# Patient Record
Sex: FEMALE | Race: WHITE | NOT HISPANIC OR LATINO | Employment: OTHER | URBAN - METROPOLITAN AREA
[De-identification: names, ages, dates, MRNs, and addresses within clinical notes are randomized per-mention and may not be internally consistent; named-entity substitution may affect disease eponyms.]

---

## 2023-10-25 ENCOUNTER — LAB (OUTPATIENT)
Dept: LAB | Facility: LAB | Age: 78
End: 2023-10-25
Payer: MEDICARE

## 2023-10-25 DIAGNOSIS — E55.9 VITAMIN D DEFICIENCY, UNSPECIFIED: Primary | ICD-10-CM

## 2023-10-25 DIAGNOSIS — E05.90 THYROTOXICOSIS, UNSPECIFIED WITHOUT THYROTOXIC CRISIS OR STORM: ICD-10-CM

## 2023-10-25 LAB
25(OH)D3 SERPL-MCNC: 25 NG/ML (ref 30–100)
ALBUMIN SERPL BCP-MCNC: 4.5 G/DL (ref 3.4–5)
ALP SERPL-CCNC: 80 U/L (ref 33–136)
ALT SERPL W P-5'-P-CCNC: 27 U/L (ref 7–45)
ANION GAP SERPL CALC-SCNC: 14 MMOL/L (ref 10–20)
AST SERPL W P-5'-P-CCNC: 32 U/L (ref 9–39)
BILIRUB SERPL-MCNC: 0.5 MG/DL (ref 0–1.2)
BUN SERPL-MCNC: 21 MG/DL (ref 6–23)
CALCIUM SERPL-MCNC: 9.2 MG/DL (ref 8.6–10.3)
CHLORIDE SERPL-SCNC: 108 MMOL/L (ref 98–107)
CO2 SERPL-SCNC: 24 MMOL/L (ref 21–32)
CREAT SERPL-MCNC: 0.83 MG/DL (ref 0.5–1.05)
GFR SERPL CREATININE-BSD FRML MDRD: 72 ML/MIN/1.73M*2
GLUCOSE SERPL-MCNC: 111 MG/DL (ref 74–99)
POTASSIUM SERPL-SCNC: 4.1 MMOL/L (ref 3.5–5.3)
PROT SERPL-MCNC: 7.2 G/DL (ref 6.4–8.2)
SODIUM SERPL-SCNC: 142 MMOL/L (ref 136–145)
T3FREE SERPL-MCNC: 2.8 PG/ML (ref 2.3–4.2)
TSH SERPL-ACNC: 3.23 MIU/L (ref 0.44–3.98)

## 2023-10-25 PROCEDURE — 80053 COMPREHEN METABOLIC PANEL: CPT

## 2023-10-25 PROCEDURE — 84443 ASSAY THYROID STIM HORMONE: CPT

## 2023-10-25 PROCEDURE — 36415 COLL VENOUS BLD VENIPUNCTURE: CPT

## 2023-10-25 PROCEDURE — 84481 FREE ASSAY (FT-3): CPT

## 2023-10-25 PROCEDURE — 82306 VITAMIN D 25 HYDROXY: CPT

## 2023-10-25 PROCEDURE — 83519 RIA NONANTIBODY: CPT

## 2023-10-27 LAB — TSH RECEP AB SER-ACNC: 2.16 IU/L

## 2023-11-07 ENCOUNTER — TELEPHONE (OUTPATIENT)
Dept: CARDIOLOGY | Facility: CLINIC | Age: 78
End: 2023-11-07
Payer: MEDICARE

## 2023-11-07 NOTE — TELEPHONE ENCOUNTER
Pt called to inquire when to take Metoprolol Succinate in the morning or evening. Dr. Roman changed Metoprolol tartrate to Metoprolol Succinate.    Called pt and informed her to take Metoprolol Succinate in the morning. Pt verbalizes understanding.

## 2024-02-05 ENCOUNTER — TELEPHONE (OUTPATIENT)
Dept: CARDIOLOGY | Facility: CLINIC | Age: 79
End: 2024-02-05
Payer: MEDICARE

## 2024-02-05 NOTE — TELEPHONE ENCOUNTER
Pt called because she was concerned about her BP. Pt states she went to [facility] today due to a flare of diverticulosis and her BP was very elevated: 200/? - pt couldn't remember diastolic. Pt states she was given an extra dose of Metoprolol and told to call her doctor because her Metoprolol  should be adjusted. Pt states she was also informed her wrist cuff is probably inaccurate.    Pt uses a wrist cuff at home and her home BP's are pretty accurate on a regular basis. Pt went home from the Urgent Care and her BP was down to 120's/70's.    Informed pt her BP may have been elevated due to her flare of diverticulitis because she was most likely having discomfort, stress and anxiety were increased. Informed pt that when used properly, wrist cuffs are accurate. Reviewed method for taking home BP and also frequency - too often may cause BP to be higher. Pt verbalizes understanding of all instructions and information provided. All questions and concerns addressed at this time.

## 2024-08-19 PROBLEM — Z96.659 TOTAL KNEE REPLACEMENT STATUS: Status: ACTIVE | Noted: 2017-09-18

## 2024-08-19 PROBLEM — F41.9 ANXIETY: Status: ACTIVE | Noted: 2020-06-17

## 2024-08-19 PROBLEM — I35.1 MODERATE AORTIC INSUFFICIENCY: Status: ACTIVE | Noted: 2024-08-19

## 2024-08-19 PROBLEM — I83.90 VARICOSE VEINS OF LOWER EXTREMITY: Status: ACTIVE | Noted: 2021-09-22

## 2024-08-19 PROBLEM — I82.409 DVT (DEEP VENOUS THROMBOSIS) (MULTI): Status: ACTIVE | Noted: 2024-08-19

## 2024-08-19 PROBLEM — K21.9 GASTROESOPHAGEAL REFLUX DISEASE: Status: ACTIVE | Noted: 2019-12-03

## 2024-08-19 PROBLEM — I80.01 THROMBOPHLEBITIS OF SUPERFICIAL VEINS OF RIGHT LOWER EXTREMITY: Status: ACTIVE | Noted: 2024-08-19

## 2024-08-19 PROBLEM — M25.552 PAIN OF BOTH HIP JOINTS: Status: ACTIVE | Noted: 2019-05-02

## 2024-08-19 PROBLEM — R07.89 CHEST PAIN, ATYPICAL: Status: ACTIVE | Noted: 2024-08-19

## 2024-08-19 PROBLEM — K57.32 DIVERTICULITIS OF COLON: Status: ACTIVE | Noted: 2024-08-19

## 2024-08-19 PROBLEM — I07.1 MODERATE TRICUSPID REGURGITATION: Status: ACTIVE | Noted: 2024-08-19

## 2024-08-19 PROBLEM — I34.0 MODERATE MITRAL REGURGITATION: Status: ACTIVE | Noted: 2024-08-19

## 2024-08-19 PROBLEM — M53.86 SCIATICA ASSOCIATED WITH DISORDER OF LUMBAR SPINE: Status: ACTIVE | Noted: 2023-06-02

## 2024-08-19 PROBLEM — E11.9 TYPE 2 DIABETES MELLITUS WITHOUT COMPLICATION (MULTI): Status: ACTIVE | Noted: 2018-06-19

## 2024-08-19 PROBLEM — M25.551 PAIN OF BOTH HIP JOINTS: Status: ACTIVE | Noted: 2019-05-02

## 2024-08-19 PROBLEM — R00.2 PALPITATION: Status: ACTIVE | Noted: 2021-11-22

## 2024-08-19 PROBLEM — I48.0 PAROXYSMAL ATRIAL FIBRILLATION (MULTI): Status: ACTIVE | Noted: 2024-08-19

## 2024-09-10 ENCOUNTER — OFFICE VISIT (OUTPATIENT)
Dept: CARDIOLOGY | Facility: CLINIC | Age: 79
End: 2024-09-10
Payer: MEDICARE

## 2024-09-10 VITALS
DIASTOLIC BLOOD PRESSURE: 68 MMHG | HEIGHT: 62 IN | SYSTOLIC BLOOD PRESSURE: 138 MMHG | WEIGHT: 140 LBS | BODY MASS INDEX: 25.76 KG/M2 | HEART RATE: 78 BPM

## 2024-09-10 DIAGNOSIS — I07.1 MODERATE TRICUSPID REGURGITATION: ICD-10-CM

## 2024-09-10 DIAGNOSIS — I34.0 MODERATE MITRAL REGURGITATION: ICD-10-CM

## 2024-09-10 DIAGNOSIS — E78.5 HYPERLIPIDEMIA, UNSPECIFIED HYPERLIPIDEMIA TYPE: ICD-10-CM

## 2024-09-10 DIAGNOSIS — R09.89 RIGHT CAROTID BRUIT: ICD-10-CM

## 2024-09-10 DIAGNOSIS — I35.1 MODERATE AORTIC INSUFFICIENCY: ICD-10-CM

## 2024-09-10 DIAGNOSIS — I10 ESSENTIAL HYPERTENSION: ICD-10-CM

## 2024-09-10 DIAGNOSIS — I48.0 PAROXYSMAL ATRIAL FIBRILLATION (MULTI): Primary | ICD-10-CM

## 2024-09-10 DIAGNOSIS — I65.23 BILATERAL CAROTID ARTERY STENOSIS: ICD-10-CM

## 2024-09-10 PROCEDURE — 3078F DIAST BP <80 MM HG: CPT | Performed by: INTERNAL MEDICINE

## 2024-09-10 PROCEDURE — 1159F MED LIST DOCD IN RCRD: CPT | Performed by: INTERNAL MEDICINE

## 2024-09-10 PROCEDURE — 99214 OFFICE O/P EST MOD 30 MIN: CPT | Performed by: INTERNAL MEDICINE

## 2024-09-10 PROCEDURE — 3075F SYST BP GE 130 - 139MM HG: CPT | Performed by: INTERNAL MEDICINE

## 2024-09-10 RX ORDER — MINERAL OIL
1 ENEMA (ML) RECTAL DAILY
COMMUNITY

## 2024-09-10 RX ORDER — LEVOTHYROXINE SODIUM 50 UG/1
50 TABLET ORAL
COMMUNITY
Start: 2023-06-27

## 2024-09-10 RX ORDER — OMEPRAZOLE 40 MG/1
40 CAPSULE, DELAYED RELEASE ORAL AS NEEDED
COMMUNITY
Start: 2021-04-22

## 2024-09-10 RX ORDER — HYDROXYCHLOROQUINE SULFATE 200 MG/1
200 TABLET, FILM COATED ORAL
COMMUNITY
Start: 2024-06-04

## 2024-09-10 RX ORDER — LISINOPRIL 20 MG/1
1 TABLET ORAL DAILY
COMMUNITY

## 2024-09-10 RX ORDER — METFORMIN HYDROCHLORIDE 500 MG/1
500 TABLET, EXTENDED RELEASE ORAL
COMMUNITY
Start: 2023-09-12

## 2024-09-10 RX ORDER — METOPROLOL SUCCINATE 50 MG/1
50 TABLET, EXTENDED RELEASE ORAL
Qty: 90 TABLET | Refills: 3 | Status: SHIPPED | OUTPATIENT
Start: 2024-09-10 | End: 2025-09-10

## 2024-09-10 RX ORDER — FLUTICASONE PROPIONATE 50 MCG
1 SPRAY, SUSPENSION (ML) NASAL DAILY
COMMUNITY
Start: 2021-04-22

## 2024-09-10 RX ORDER — METOPROLOL SUCCINATE 50 MG/1
1 TABLET, EXTENDED RELEASE ORAL
COMMUNITY
Start: 2023-09-05 | End: 2024-09-10 | Stop reason: SDUPTHER

## 2024-09-10 RX ORDER — ASPIRIN 81 MG/1
1 TABLET ORAL DAILY
COMMUNITY

## 2024-09-10 ASSESSMENT — ENCOUNTER SYMPTOMS
BACK PAIN: 1
NECK PAIN: 1
DYSPNEA ON EXERTION: 1

## 2024-09-10 NOTE — PROGRESS NOTES
Subjective   Nicki Paulson is a 79 y.o. female.    Chief Complaint:  Follow-up atrial fibrillation and valvular heart disease.  Leg edema.    HPI    From a cardiac standpoint she has done well over the past year.  She has rare episodes of palpitations.  No sustained arrhythmias.  She does monitor blood pressures at home and they have been normal.  She has had back problems and neck pain.  She has had back surgery.  She does monitor her heart rates on a regular basis    The patient underwent right knee surgery in 2018 at [facility]. Post procedure, she had an episode of atrial fibrillation. She was started on amiodarone and on Eliquis. Ultimately we stopped these medications and she has not had any recurrence of her atrial arrhythmias.     The patient's risk factors for the presence of coronary artery disease include hypertension, diabetes and hyperlipidemia. She is a nonsmoker and has never smoked. There is no definite family history of premature coronary artery disease.     The patient underwent a coronary calcium score on 12/28/17. This turned out to be zero indicating the absence of atherosclerotic coronary artery disease.     A cardiac event monitor from 1/2/18 to 1/15/18 demonstrated no evidence of atrial fibrillation.     She has a past history of saphenous vein ablation done by Dr. Dukes several years ago..     Allergies  Medication    · ciprofloxacin   Recorded By: Jazmyn Monge; 7/5/2018 2:42:51 PM   · Lipitor   Recorded By: Jazmyn Monge; 7/5/2018 2:42:51 PM     Family History  Mother    · No pertinent family history  Father    · No pertinent family history     Social History  Problems    ·    · Never a smoker   · No alcohol use    Review of Systems   Cardiovascular:  Positive for dyspnea on exertion.   Musculoskeletal:  Positive for arthritis, back pain and neck pain.       No current outpatient medications on file.     No current facility-administered medications for this  visit.        There were no vitals taken for this visit.     Objective     Constitutional:       Appearance: Not in distress.   Neck:      Vascular: JVD normal.   Pulmonary:      Breath sounds: Normal breath sounds.   Cardiovascular:      Normal rate. Regular rhythm. Normal S1. Normal S2.       Murmurs: There is no murmur.      No gallop.    Pulses:     Intact distal pulses.   Edema:     Peripheral edema present.     Pretibial: 1+ edema of the left pretibial area.     Ankle: 1+ edema of the left ankle.  Abdominal:      General: There is no distension.      Palpations: Abdomen is soft.   Neurological:      Mental Status: Alert.         Lab Review:   Lab Results   Component Value Date     10/25/2023    K 4.1 10/25/2023     (H) 10/25/2023    CO2 24 10/25/2023    BUN 21 10/25/2023    CREATININE 0.83 10/25/2023    GLUCOSE 111 (H) 10/25/2023    CALCIUM 9.2 10/25/2023     Assessment:    1.  Paroxysmal atrial fibrillation.  Continues to maintain sinus rhythm.  Her atrial fibrillation was in association with knee surgery in 2018.  Since that time she has not had any recurrence.  We have discussed with her options to monitor her heart rates.    2.  Coronary disease risk factors.  CT coronary calcium score in 2017 was 0.    3.  Hypertension.  Mildly elevated blood pressures today.  Somewhat anxious today.    4.  Moderate mitral regurgitation.  No left heart failure.    5.  Moderate tricuspid regurgitation.  Mild lower extremity edema.  May benefit from some diuretic therapy in the future.    6.  Arthritis.  Patient is on Plaquenil.  She could not give me a reason.  Request local rheumatologist.  Will make a referral

## 2024-09-10 NOTE — PATIENT INSTRUCTIONS
You are maintaining a normal rhythm    Your CT scan shows some plaque buildup in your carotid arteries.  We will check your carotid vessels with an ultrasound test on your next visit

## 2024-09-19 ENCOUNTER — APPOINTMENT (OUTPATIENT)
Dept: CARDIOLOGY | Facility: HOSPITAL | Age: 79
DRG: 394 | End: 2024-09-19
Payer: MEDICARE

## 2024-09-19 ENCOUNTER — HOSPITAL ENCOUNTER (INPATIENT)
Facility: HOSPITAL | Age: 79
DRG: 872 | End: 2024-09-19
Attending: EMERGENCY MEDICINE | Admitting: INTERNAL MEDICINE
Payer: MEDICARE

## 2024-09-19 ENCOUNTER — APPOINTMENT (OUTPATIENT)
Dept: RADIOLOGY | Facility: HOSPITAL | Age: 79
DRG: 394 | End: 2024-09-19
Payer: MEDICARE

## 2024-09-19 DIAGNOSIS — N39.0 UTI (URINARY TRACT INFECTION), BACTERIAL: Primary | ICD-10-CM

## 2024-09-19 DIAGNOSIS — K62.5 RECTAL BLEEDING: ICD-10-CM

## 2024-09-19 DIAGNOSIS — I48.0 PAROXYSMAL ATRIAL FIBRILLATION (MULTI): ICD-10-CM

## 2024-09-19 DIAGNOSIS — I10 ESSENTIAL HYPERTENSION: ICD-10-CM

## 2024-09-19 DIAGNOSIS — K55.9 ISCHEMIC COLITIS (MULTI): ICD-10-CM

## 2024-09-19 DIAGNOSIS — A49.9 UTI (URINARY TRACT INFECTION), BACTERIAL: Primary | ICD-10-CM

## 2024-09-19 LAB
ALBUMIN SERPL BCP-MCNC: 4.3 G/DL (ref 3.4–5)
ALP SERPL-CCNC: 83 U/L (ref 33–136)
ALT SERPL W P-5'-P-CCNC: 17 U/L (ref 7–45)
ANION GAP SERPL CALC-SCNC: 15 MMOL/L (ref 10–20)
APPEARANCE UR: CLEAR
AST SERPL W P-5'-P-CCNC: 19 U/L (ref 9–39)
BACTERIA #/AREA URNS AUTO: ABNORMAL /HPF
BASOPHILS # BLD AUTO: 0.03 X10*3/UL (ref 0–0.1)
BASOPHILS NFR BLD AUTO: 0.1 %
BILIRUB SERPL-MCNC: 0.8 MG/DL (ref 0–1.2)
BILIRUB UR STRIP.AUTO-MCNC: NEGATIVE MG/DL
BUN SERPL-MCNC: 32 MG/DL (ref 6–23)
CALCIUM SERPL-MCNC: 9.4 MG/DL (ref 8.6–10.3)
CHLORIDE SERPL-SCNC: 103 MMOL/L (ref 98–107)
CO2 SERPL-SCNC: 24 MMOL/L (ref 21–32)
COLOR UR: ABNORMAL
CREAT SERPL-MCNC: 1.24 MG/DL (ref 0.5–1.05)
EGFRCR SERPLBLD CKD-EPI 2021: 44 ML/MIN/1.73M*2
EOSINOPHIL # BLD AUTO: 0 X10*3/UL (ref 0–0.4)
EOSINOPHIL NFR BLD AUTO: 0 %
ERYTHROCYTE [DISTWIDTH] IN BLOOD BY AUTOMATED COUNT: 13.2 % (ref 11.5–14.5)
GLUCOSE SERPL-MCNC: 145 MG/DL (ref 74–99)
GLUCOSE UR STRIP.AUTO-MCNC: NORMAL MG/DL
HCT VFR BLD AUTO: 42.8 % (ref 36–46)
HGB BLD-MCNC: 14.2 G/DL (ref 12–16)
HOLD SPECIMEN: NORMAL
IMM GRANULOCYTES # BLD AUTO: 0.12 X10*3/UL (ref 0–0.5)
IMM GRANULOCYTES NFR BLD AUTO: 0.6 % (ref 0–0.9)
INR PPP: 1.1 (ref 0.9–1.1)
KETONES UR STRIP.AUTO-MCNC: NEGATIVE MG/DL
LACTATE SERPL-SCNC: 1.3 MMOL/L (ref 0.4–2)
LEUKOCYTE ESTERASE UR QL STRIP.AUTO: ABNORMAL
LIPASE SERPL-CCNC: 11 U/L (ref 9–82)
LYMPHOCYTES # BLD AUTO: 2.51 X10*3/UL (ref 0.8–3)
LYMPHOCYTES NFR BLD AUTO: 12.2 %
MAGNESIUM SERPL-MCNC: 2.01 MG/DL (ref 1.6–2.4)
MCH RBC QN AUTO: 29.3 PG (ref 26–34)
MCHC RBC AUTO-ENTMCNC: 33.2 G/DL (ref 32–36)
MCV RBC AUTO: 88 FL (ref 80–100)
MONOCYTES # BLD AUTO: 1.65 X10*3/UL (ref 0.05–0.8)
MONOCYTES NFR BLD AUTO: 8 %
MUCOUS THREADS #/AREA URNS AUTO: ABNORMAL /LPF
NEUTROPHILS # BLD AUTO: 16.25 X10*3/UL (ref 1.6–5.5)
NEUTROPHILS NFR BLD AUTO: 79.1 %
NITRITE UR QL STRIP.AUTO: NEGATIVE
NRBC BLD-RTO: 0 /100 WBCS (ref 0–0)
PH UR STRIP.AUTO: 5 [PH]
PLATELET # BLD AUTO: 296 X10*3/UL (ref 150–450)
POTASSIUM SERPL-SCNC: 4.1 MMOL/L (ref 3.5–5.3)
PROT SERPL-MCNC: 7.7 G/DL (ref 6.4–8.2)
PROT UR STRIP.AUTO-MCNC: NEGATIVE MG/DL
PROTHROMBIN TIME: 12.8 SECONDS (ref 9.8–12.8)
RBC # BLD AUTO: 4.85 X10*6/UL (ref 4–5.2)
RBC # UR STRIP.AUTO: ABNORMAL /UL
RBC #/AREA URNS AUTO: ABNORMAL /HPF
SODIUM SERPL-SCNC: 138 MMOL/L (ref 136–145)
SP GR UR STRIP.AUTO: 1.01
UROBILINOGEN UR STRIP.AUTO-MCNC: NORMAL MG/DL
WBC # BLD AUTO: 20.6 X10*3/UL (ref 4.4–11.3)
WBC #/AREA URNS AUTO: ABNORMAL /HPF
WBC CLUMPS #/AREA URNS AUTO: ABNORMAL /HPF

## 2024-09-19 PROCEDURE — 85025 COMPLETE CBC W/AUTO DIFF WBC: CPT | Performed by: NURSE PRACTITIONER

## 2024-09-19 PROCEDURE — 2550000001 HC RX 255 CONTRASTS: Performed by: EMERGENCY MEDICINE

## 2024-09-19 PROCEDURE — 2500000004 HC RX 250 GENERAL PHARMACY W/ HCPCS (ALT 636 FOR OP/ED): Performed by: NURSE PRACTITIONER

## 2024-09-19 PROCEDURE — 85610 PROTHROMBIN TIME: CPT | Performed by: NURSE PRACTITIONER

## 2024-09-19 PROCEDURE — 87086 URINE CULTURE/COLONY COUNT: CPT | Mod: PARLAB | Performed by: NURSE PRACTITIONER

## 2024-09-19 PROCEDURE — 96361 HYDRATE IV INFUSION ADD-ON: CPT

## 2024-09-19 PROCEDURE — 2500000004 HC RX 250 GENERAL PHARMACY W/ HCPCS (ALT 636 FOR OP/ED): Performed by: EMERGENCY MEDICINE

## 2024-09-19 PROCEDURE — 1200000002 HC GENERAL ROOM WITH TELEMETRY DAILY

## 2024-09-19 PROCEDURE — 99223 1ST HOSP IP/OBS HIGH 75: CPT | Performed by: NURSE PRACTITIONER

## 2024-09-19 PROCEDURE — 80051 ELECTROLYTE PANEL: CPT | Performed by: NURSE PRACTITIONER

## 2024-09-19 PROCEDURE — 83605 ASSAY OF LACTIC ACID: CPT | Performed by: NURSE PRACTITIONER

## 2024-09-19 PROCEDURE — 36415 COLL VENOUS BLD VENIPUNCTURE: CPT | Performed by: NURSE PRACTITIONER

## 2024-09-19 PROCEDURE — 83690 ASSAY OF LIPASE: CPT | Performed by: NURSE PRACTITIONER

## 2024-09-19 PROCEDURE — 96366 THER/PROPH/DIAG IV INF ADDON: CPT

## 2024-09-19 PROCEDURE — 99285 EMERGENCY DEPT VISIT HI MDM: CPT | Mod: 25

## 2024-09-19 PROCEDURE — 2500000001 HC RX 250 WO HCPCS SELF ADMINISTERED DRUGS (ALT 637 FOR MEDICARE OP): Performed by: STUDENT IN AN ORGANIZED HEALTH CARE EDUCATION/TRAINING PROGRAM

## 2024-09-19 PROCEDURE — 74174 CTA ABD&PLVS W/CONTRAST: CPT

## 2024-09-19 PROCEDURE — 2500000001 HC RX 250 WO HCPCS SELF ADMINISTERED DRUGS (ALT 637 FOR MEDICARE OP): Performed by: NURSE PRACTITIONER

## 2024-09-19 PROCEDURE — 81001 URINALYSIS AUTO W/SCOPE: CPT | Performed by: NURSE PRACTITIONER

## 2024-09-19 PROCEDURE — 96365 THER/PROPH/DIAG IV INF INIT: CPT | Mod: 59

## 2024-09-19 PROCEDURE — 93005 ELECTROCARDIOGRAM TRACING: CPT

## 2024-09-19 PROCEDURE — 83735 ASSAY OF MAGNESIUM: CPT | Performed by: NURSE PRACTITIONER

## 2024-09-19 PROCEDURE — 74174 CTA ABD&PLVS W/CONTRAST: CPT | Performed by: RADIOLOGY

## 2024-09-19 RX ORDER — LEVOTHYROXINE SODIUM 50 UG/1
50 TABLET ORAL
Status: DISCONTINUED | OUTPATIENT
Start: 2024-09-20 | End: 2024-09-23 | Stop reason: HOSPADM

## 2024-09-19 RX ORDER — ACETAMINOPHEN 500 MG
2000 TABLET ORAL NIGHTLY
COMMUNITY

## 2024-09-19 RX ORDER — ACETAMINOPHEN 160 MG/5ML
650 SOLUTION ORAL EVERY 4 HOURS PRN
Status: DISCONTINUED | OUTPATIENT
Start: 2024-09-19 | End: 2024-09-23 | Stop reason: HOSPADM

## 2024-09-19 RX ORDER — CETIRIZINE HYDROCHLORIDE 10 MG/1
10 TABLET ORAL DAILY PRN
Status: DISCONTINUED | OUTPATIENT
Start: 2024-09-19 | End: 2024-09-23 | Stop reason: HOSPADM

## 2024-09-19 RX ORDER — POLYETHYLENE GLYCOL 3350, SODIUM CHLORIDE, SODIUM BICARBONATE, POTASSIUM CHLORIDE 420; 11.2; 5.72; 1.48 G/4L; G/4L; G/4L; G/4L
4000 POWDER, FOR SOLUTION ORAL ONCE
Status: COMPLETED | OUTPATIENT
Start: 2024-09-19 | End: 2024-09-19

## 2024-09-19 RX ORDER — HYDRALAZINE HYDROCHLORIDE 20 MG/ML
5 INJECTION INTRAMUSCULAR; INTRAVENOUS EVERY 6 HOURS PRN
Status: DISCONTINUED | OUTPATIENT
Start: 2024-09-19 | End: 2024-09-23 | Stop reason: HOSPADM

## 2024-09-19 RX ORDER — METOPROLOL SUCCINATE 50 MG/1
50 TABLET, EXTENDED RELEASE ORAL DAILY
Status: DISCONTINUED | OUTPATIENT
Start: 2024-09-20 | End: 2024-09-20

## 2024-09-19 RX ORDER — ACETAMINOPHEN 325 MG/1
650 TABLET ORAL EVERY 4 HOURS PRN
Status: DISCONTINUED | OUTPATIENT
Start: 2024-09-19 | End: 2024-09-23 | Stop reason: HOSPADM

## 2024-09-19 RX ORDER — PANTOPRAZOLE SODIUM 40 MG/1
40 TABLET, DELAYED RELEASE ORAL
Status: DISCONTINUED | OUTPATIENT
Start: 2024-09-20 | End: 2024-09-23 | Stop reason: HOSPADM

## 2024-09-19 RX ORDER — TRAMADOL HYDROCHLORIDE 50 MG/1
50 TABLET ORAL EVERY 8 HOURS PRN
Status: DISCONTINUED | OUTPATIENT
Start: 2024-09-19 | End: 2024-09-23 | Stop reason: HOSPADM

## 2024-09-19 RX ORDER — FLUTICASONE PROPIONATE 50 MCG
1 SPRAY, SUSPENSION (ML) NASAL DAILY PRN
Status: DISCONTINUED | OUTPATIENT
Start: 2024-09-19 | End: 2024-09-23 | Stop reason: HOSPADM

## 2024-09-19 RX ORDER — CHOLECALCIFEROL (VITAMIN D3) 25 MCG
2000 TABLET ORAL NIGHTLY
Status: DISCONTINUED | OUTPATIENT
Start: 2024-09-19 | End: 2024-09-23 | Stop reason: HOSPADM

## 2024-09-19 RX ORDER — LISINOPRIL 20 MG/1
20 TABLET ORAL DAILY
Status: DISCONTINUED | OUTPATIENT
Start: 2024-09-20 | End: 2024-09-23 | Stop reason: HOSPADM

## 2024-09-19 RX ORDER — INSULIN LISPRO 100 [IU]/ML
0-10 INJECTION, SOLUTION INTRAVENOUS; SUBCUTANEOUS EVERY 6 HOURS
Status: DISCONTINUED | OUTPATIENT
Start: 2024-09-19 | End: 2024-09-23 | Stop reason: HOSPADM

## 2024-09-19 RX ORDER — ONDANSETRON HYDROCHLORIDE 2 MG/ML
4 INJECTION, SOLUTION INTRAVENOUS EVERY 6 HOURS PRN
Status: DISCONTINUED | OUTPATIENT
Start: 2024-09-19 | End: 2024-09-23 | Stop reason: HOSPADM

## 2024-09-19 RX ORDER — SODIUM CHLORIDE 9 MG/ML
75 INJECTION, SOLUTION INTRAVENOUS CONTINUOUS
Status: DISCONTINUED | OUTPATIENT
Start: 2024-09-19 | End: 2024-09-19

## 2024-09-19 RX ORDER — HYDROXYCHLOROQUINE SULFATE 200 MG/1
200 TABLET, FILM COATED ORAL EVERY MORNING
Status: DISCONTINUED | OUTPATIENT
Start: 2024-09-20 | End: 2024-09-23 | Stop reason: HOSPADM

## 2024-09-19 RX ORDER — SODIUM CHLORIDE, SODIUM LACTATE, POTASSIUM CHLORIDE, CALCIUM CHLORIDE 600; 310; 30; 20 MG/100ML; MG/100ML; MG/100ML; MG/100ML
75 INJECTION, SOLUTION INTRAVENOUS CONTINUOUS
Status: ACTIVE | OUTPATIENT
Start: 2024-09-19 | End: 2024-09-20

## 2024-09-19 RX ORDER — DEXTROSE 50 % IN WATER (D50W) INTRAVENOUS SYRINGE
12.5
Status: DISCONTINUED | OUTPATIENT
Start: 2024-09-19 | End: 2024-09-23 | Stop reason: HOSPADM

## 2024-09-19 RX ORDER — DEXTROSE 50 % IN WATER (D50W) INTRAVENOUS SYRINGE
25
Status: DISCONTINUED | OUTPATIENT
Start: 2024-09-19 | End: 2024-09-23 | Stop reason: HOSPADM

## 2024-09-19 RX ORDER — ASPIRIN 81 MG/1
81 TABLET ORAL NIGHTLY
Status: DISCONTINUED | OUTPATIENT
Start: 2024-09-19 | End: 2024-09-23 | Stop reason: HOSPADM

## 2024-09-19 RX ORDER — ACETAMINOPHEN 650 MG/1
650 SUPPOSITORY RECTAL EVERY 4 HOURS PRN
Status: DISCONTINUED | OUTPATIENT
Start: 2024-09-19 | End: 2024-09-23 | Stop reason: HOSPADM

## 2024-09-19 SDOH — SOCIAL STABILITY: SOCIAL INSECURITY: WERE YOU ABLE TO COMPLETE ALL THE BEHAVIORAL HEALTH SCREENINGS?: YES

## 2024-09-19 SDOH — SOCIAL STABILITY: SOCIAL INSECURITY: ABUSE: ADULT

## 2024-09-19 SDOH — SOCIAL STABILITY: SOCIAL INSECURITY: DO YOU FEEL ANYONE HAS EXPLOITED OR TAKEN ADVANTAGE OF YOU FINANCIALLY OR OF YOUR PERSONAL PROPERTY?: NO

## 2024-09-19 SDOH — SOCIAL STABILITY: SOCIAL INSECURITY: DO YOU FEEL UNSAFE GOING BACK TO THE PLACE WHERE YOU ARE LIVING?: NO

## 2024-09-19 SDOH — SOCIAL STABILITY: SOCIAL INSECURITY: ARE YOU OR HAVE YOU BEEN THREATENED OR ABUSED PHYSICALLY, EMOTIONALLY, OR SEXUALLY BY ANYONE?: NO

## 2024-09-19 SDOH — SOCIAL STABILITY: SOCIAL INSECURITY: HAVE YOU HAD THOUGHTS OF HARMING ANYONE ELSE?: NO

## 2024-09-19 SDOH — SOCIAL STABILITY: SOCIAL INSECURITY: ARE THERE ANY APPARENT SIGNS OF INJURIES/BEHAVIORS THAT COULD BE RELATED TO ABUSE/NEGLECT?: NO

## 2024-09-19 SDOH — SOCIAL STABILITY: SOCIAL INSECURITY: DOES ANYONE TRY TO KEEP YOU FROM HAVING/CONTACTING OTHER FRIENDS OR DOING THINGS OUTSIDE YOUR HOME?: NO

## 2024-09-19 SDOH — SOCIAL STABILITY: SOCIAL INSECURITY: HAVE YOU HAD ANY THOUGHTS OF HARMING ANYONE ELSE?: NO

## 2024-09-19 SDOH — SOCIAL STABILITY: SOCIAL INSECURITY: HAS ANYONE EVER THREATENED TO HURT YOUR FAMILY OR YOUR PETS?: NO

## 2024-09-19 ASSESSMENT — COGNITIVE AND FUNCTIONAL STATUS - GENERAL
CLIMB 3 TO 5 STEPS WITH RAILING: A LITTLE
MOBILITY SCORE: 24
DAILY ACTIVITIY SCORE: 24
DRESSING REGULAR LOWER BODY CLOTHING: A LITTLE
PATIENT BASELINE BEDBOUND: NO
DAILY ACTIVITIY SCORE: 23
WALKING IN HOSPITAL ROOM: A LITTLE
MOBILITY SCORE: 22

## 2024-09-19 ASSESSMENT — LIFESTYLE VARIABLES
HOW OFTEN DO YOU HAVE 6 OR MORE DRINKS ON ONE OCCASION: NEVER
PRESCIPTION_ABUSE_PAST_12_MONTHS: NO
AUDIT-C TOTAL SCORE: 0
HAVE PEOPLE ANNOYED YOU BY CRITICIZING YOUR DRINKING: NO
HOW OFTEN DO YOU HAVE A DRINK CONTAINING ALCOHOL: NEVER
SUBSTANCE_ABUSE_PAST_12_MONTHS: NO
TOTAL SCORE: 0
SKIP TO QUESTIONS 9-10: 1
HOW MANY STANDARD DRINKS CONTAINING ALCOHOL DO YOU HAVE ON A TYPICAL DAY: PATIENT DOES NOT DRINK
HAVE YOU EVER FELT YOU SHOULD CUT DOWN ON YOUR DRINKING: NO
AUDIT-C TOTAL SCORE: 0
EVER FELT BAD OR GUILTY ABOUT YOUR DRINKING: NO
EVER HAD A DRINK FIRST THING IN THE MORNING TO STEADY YOUR NERVES TO GET RID OF A HANGOVER: NO

## 2024-09-19 ASSESSMENT — ACTIVITIES OF DAILY LIVING (ADL)
BATHING: INDEPENDENT
HEARING - LEFT EAR: FUNCTIONAL
DRESSING YOURSELF: INDEPENDENT
JUDGMENT_ADEQUATE_SAFELY_COMPLETE_DAILY_ACTIVITIES: YES
TOILETING: INDEPENDENT
LACK_OF_TRANSPORTATION: NO
PATIENT'S MEMORY ADEQUATE TO SAFELY COMPLETE DAILY ACTIVITIES?: YES
FEEDING YOURSELF: INDEPENDENT
WALKS IN HOME: INDEPENDENT
HEARING - RIGHT EAR: FUNCTIONAL
ADEQUATE_TO_COMPLETE_ADL: YES
GROOMING: INDEPENDENT

## 2024-09-19 ASSESSMENT — COLUMBIA-SUICIDE SEVERITY RATING SCALE - C-SSRS
2. HAVE YOU ACTUALLY HAD ANY THOUGHTS OF KILLING YOURSELF?: NO
1. IN THE PAST MONTH, HAVE YOU WISHED YOU WERE DEAD OR WISHED YOU COULD GO TO SLEEP AND NOT WAKE UP?: NO
6. HAVE YOU EVER DONE ANYTHING, STARTED TO DO ANYTHING, OR PREPARED TO DO ANYTHING TO END YOUR LIFE?: NO
2. HAVE YOU ACTUALLY HAD ANY THOUGHTS OF KILLING YOURSELF?: NO
6. HAVE YOU EVER DONE ANYTHING, STARTED TO DO ANYTHING, OR PREPARED TO DO ANYTHING TO END YOUR LIFE?: NO
1. IN THE PAST MONTH, HAVE YOU WISHED YOU WERE DEAD OR WISHED YOU COULD GO TO SLEEP AND NOT WAKE UP?: NO

## 2024-09-19 ASSESSMENT — PATIENT HEALTH QUESTIONNAIRE - PHQ9
SUM OF ALL RESPONSES TO PHQ9 QUESTIONS 1 & 2: 0
1. LITTLE INTEREST OR PLEASURE IN DOING THINGS: NOT AT ALL
2. FEELING DOWN, DEPRESSED OR HOPELESS: NOT AT ALL

## 2024-09-19 ASSESSMENT — PAIN SCALES - GENERAL: PAINLEVEL_OUTOF10: 0 - NO PAIN

## 2024-09-19 ASSESSMENT — PAIN - FUNCTIONAL ASSESSMENT: PAIN_FUNCTIONAL_ASSESSMENT: 0-10

## 2024-09-19 NOTE — ED TRIAGE NOTES
Patient arrives from home with complaints of blood in stool.  Patient states she has diverticulitis and had 4 large bright red bowel movements since last night.

## 2024-09-19 NOTE — ASSESSMENT & PLAN NOTE
79-year-old female with past medical history of hypertension, hyperlipidemia, diabetes mellitus, atrial fibrillation and valvular heart disease (No Ac, aspirin only), Graves' disease, Sjogren's, osteoporosis, multiple spinal surgeries w/ most recent 3/2024 , appendectomy, JONATHAN, and anxiety.  Patient presents with abdominal pain and bloody stools.

## 2024-09-19 NOTE — CONSULTS
History of Present Illness:   Nicki Paulson is a 79 y.o. female with a PMH of HTN, HLD, DM, atrial fibrillation (no anticoagulation), Graves' disease, Sjogren's, osteoporosis, multiple spinal surgeries, appendectomy, JONATHAN, diverticulosis/diverticulitis, and anxiety who presented to the hospital with abdominal pain and bloody stools with CT imaging concerning for diffuse wall thickening from the splenic flexure to the distal sigmoid colon and in whom we are consulted for further management.  Patient states that she started with lower abdominal pain about a week ago.  It is located in the lower abdomen bilaterally and radiates to the back.  She thought it may be a diverticulitis flare as she has had multiple in the past.  However, it progressed to multiple episodes of rectal bleeding starting yesterday.  Due to the multiple episodes, she thought it best to present to the ER.  She also endorses some small, few diarrhea episodes.  She has never had bleeding with her prior diverticulitis flares. Hemoglobin: WNL.  CMP with mild DEQUAN.  INR: 1.1.    Colonoscopy 2022: Severe diverticulosis.  EGD 2022: WNL.    Review of Systems  ROS Negative unless otherwise stated above.    Past Medical History   has a past medical history of Other chest pain, Personal history of other endocrine, nutritional and metabolic disease, Personal history of other specified conditions, and Varicose veins of unspecified lower extremity with other complications.     Social History   reports that she has never smoked. She has never used smokeless tobacco. She reports that she does not drink alcohol and does not use drugs.     Family History  family history is not on file.     Allergies  Allergies   Allergen Reactions    Atorvastatin Confusion, Blurred vision and Myalgia     brain fog    Ciprofloxacin Hives and Rash     Rash from Cipro that was given for the Diverticular ds. Had seen dermatology at [facility].    Bactrim [Sulfamethoxazole-Trimethoprim]  Unknown     Unknown reaction       Medications  Current Outpatient Medications   Medication Instructions    aspirin 81 mg EC tablet 1 tablet, oral, Nightly    cholecalciferol (D3-2000) 2,000 Units, oral, Nightly    fexofenadine (Allegra Allergy) 180 mg tablet 1 tablet, oral, Daily PRN    fluticasone (Flonase) 50 mcg/actuation nasal spray 1 spray, Each Nostril, Daily PRN    hydroxychloroquine (PLAQUENIL) 200 mg, oral, Every morning    levothyroxine (SYNTHROID) 50 mcg, oral, 4 times weekly, Every Monday/Tuesday/Thursday/Friday    lisinopril 20 mg tablet 1 tablet, oral, Daily    metFORMIN XR (GLUCOPHAGE-XR) 500 mg, oral, Daily with evening meal    metoprolol succinate XL (TOPROL-XL) 50 mg, oral, Daily RT    omeprazole (PRILOSEC) 40 mg, oral, As needed        Objective   Visit Vitals  BP (!) 181/78   Pulse 86   Temp 36.3 °C (97.3 °F) (Temporal)   Resp (!) 25        General: A&Ox3, NAD.  HEENT: AT/NC.   CV: RRR. No murmur.  Resp: CTA bilaterally. No wheezing, rhonchi or rales.   GI: Soft, ND. TTP in the LLQ.  Extrem: No edema. Pulses intact.  Skin: No Jaundice.   Neuro: No focal deficits.   Psych: Normal mood and affect.       Results from last 7 days   Lab Units 09/19/24  1101   WBC AUTO x10*3/uL 20.6*   HEMOGLOBIN g/dL 14.2   HEMATOCRIT % 42.8   PLATELETS AUTO x10*3/uL 296     Results from last 7 days   Lab Units 09/19/24  1101   SODIUM mmol/L 138   POTASSIUM mmol/L 4.1   CHLORIDE mmol/L 103   CO2 mmol/L 24   BUN mg/dL 32*   CREATININE mg/dL 1.24*   CALCIUM mg/dL 9.4   PROTEIN TOTAL g/dL 7.7   BILIRUBIN TOTAL mg/dL 0.8   ALK PHOS U/L 83   ALT U/L 17   AST U/L 19   GLUCOSE mg/dL 145*         ASSESSMENT/PLAN:  Nicki Paulson is a 79 y.o. female with a PMH of HTN, HLD, DM, atrial fibrillation (no anticoagulation), Graves' disease, Sjogren's, osteoporosis, multiple spinal surgeries, appendectomy, JONATHAN, and anxiety who presented to the hospital with abdominal pain and bloody stools with CT imaging concerning for diffuse  wall thickening from the splenic flexure to the sigmoid colon and in whom we are consulted for further management.     Patient is afebrile, HDS.  + Leukocytosis.  Hemoglobin: WNL.    CMP with mild DEQUAN.    INR: 1.1.    Patient's presentation is concerning for ischemic colitis.  Doubt diverticulitis given the extent/length of inflammation seen on CT.  Please prep patient tonight, NPO after midnight.  Colonoscopy tomorrow.  If patient does not prep well, can move forward with flexible sigmoidoscopy to identify the distal extent of inflammation and take biopsies.    Luigi Verdin,   GI Attending

## 2024-09-19 NOTE — ED PROVIDER NOTES
Limitations to History: None     HPI:      Nicki Paulson is a 79 y.o. female with significant past medical history for DM, paroxysmal A-fib, hypothyroidism, HLD, HTN, anxiety, GERD, DVT and prior surgical history including appendectomy, hysterectomy and bladder suspension presenting to ED today from home with  for evaluation of rectal bleeding.  Patient reports intermittent abdominal pain over the last week.  Last night at 11 PM the patient began having bright red blood per rectum with clots up to 4 episodes.  Endorses nausea.  Currently central abdominal pain is rated 5/10.  Motrin taken last evening provided no relief of symptoms.  Symptoms are similar to prior episodes of diverticulitis.  Daily baby aspirin, no other thinners.  Denies fever/chills, cough/cold symptoms, chest pain, shortness of breath, vomiting, dysuria/hematuria or any other complaints.  No smoking, EtOH or drug use.  PCP is Dr. Ralph.  Cardiologist is Dr. Roman.    Additional History Obtained from: Nursing/triage notes reviewed.    ------------------------------------------------------------------------------------------------------------------------------------------    VS: As documented in the triage note and EMR flowsheet from this visit were reviewed.    Physical Exam:  Gen: Pleasant elderly  female, awake and alert, oriented x 3.  Well-nourished and hydrated.  Nontoxic looking.  Head/Neck: NCAT, neck w/ FROM  Eyes: EOMI, PERRL, anicteric sclerae, noninjected conjunctivae  Ears: TMs clear b/l without sign of infection  Nose: Nares patent w/o rhinorrhea  Mouth:  MMM, no OP lesions noted  Heart: RRR no MRG  Lungs: CTA b/l no RRW, no increased work of breathing  Abdomen: Round and soft with bowel sounds, tender in the periumbilical region, no rebound or guarding.  No palpable organomegaly.  No CVA tenderness.  Musculoskeletal: KARIE x 4.  MSPs intact.  Skin intact.  No deformities.  Neurologic: Alert, symmetrical facies,  phonates clearly, moves all extremities equally, responsive to touch, ambulates normally   Skin: Pink, warm dry.  No erythema, edema or ecchymosis.  No rashes noted  Psychological: calm, no SI/HI      ------------------------------------------------------------------------------------------------------------------------------------------    Medical Decision Makin y.o. female with significant past medical history for DM, paroxysmal A-fib, hypothyroidism, HLD, HTN, anxiety, GERD, DVT and prior surgical history including appendectomy, hysterectomy and bladder suspension who is on daily baby aspirin evaluated at bedside for episodes of bright red blood per rectum with clots since 11 PM last evening.  Intermittent abdominal pain x 1 week.  To the ED, blood pressure 195/77, patient is not tachycardic, hypoxic or febrile.  Lungs are clear, abdomen rounded soft and tender in the periumbilical region.  Differential includes but is not limited to diverticulitis, mesenteric ischemia, colitis.  NPO.  IV established, continuous cardiac/O2 sat monitoring.  Basic labs, UA/urine culture and CT angio of the abdomen and pelvis will be performed.  1 L normal saline wide open with maintenance rate to follow.  Declining antiemetics and pain relief at this time.    ED Course as of 24 1448   Thu Sep 19, 2024   1117 EKG obtained interpreted by me.  Sinus rhythm.  LVH.  Rate of 80.  No acute ischemia.  No STEMI. [MK]   1141 Laboratory studies thus far were reviewed, patient has a significant leukocytosis of 20.6 with a left shift.  No evidence of anemia.  Coags within normal limits.  Blood shows evidence of infection with leukocyte esterase, white cells and bacteria.  Imaging and remainder of lab work pending, high suspicion for diverticulitis, covered with Zosyn. [SB]   1358 CMP shows a creatinine of 1.24 with a GFR 44, this is improved from baseline that was normal 11 months ago.  Lipase normal.  CT angio images were reviewed  per Dr. Jacobson, appears to be an ischemic colitis, still waiting on definitive read.  Either way treatment is the same whether this is diverticulitis or ischemic colitis.  Will require admission for IV antibiotics and hydration.  Dr. Jacobson spoke with Dr. Dexter regarding the case, he agrees to admission to acute care bed.  House NP aware.  Diagnosis, treatment and plan for admission discussed with patient, she verbalizes understanding and is in agreement.  Condition fair. [SB]   1448 CT read shows acute colitis versus uncomplicated diverticulitis. [SB]      ED Course User Index  [MK] Chandan Tay MD  [SB] Dottie Tineo, APRN-CNP         Diagnoses as of 09/19/24 1448   UTI (urinary tract infection), bacterial   Ischemic colitis (Multi)       EKG interpreted by Dr. Jacobson    Chronic Medical Conditions Significantly Affecting Care: None    External Records Reviewed: I reviewed recent and relevant outside records including: None    Discussion of Management with Other Providers: Seen and evaluated with ED attending physician, Dr. Jacobson, he agrees with the treatment plan and final disposition of the patient.    I discussed the patient/results with: / JULIA Brian NP-JOLEEN  09/19/24 [id number]

## 2024-09-19 NOTE — PROGRESS NOTES
Pharmacy Medication History Review    Nicki Paulson is a 79 y.o. female admitted for No Principal Problem: There is no principal problem currently on the Problem List. Please update the Problem List and refresh.. Pharmacy reviewed the patient's [id number] medications and allergies for accuracy.    The list below reflectives the updated PTA list. Please review each medication in order reconciliation for additional clarification and justification.  Prior to Admission medications    Medication Sig Start Date End Date Taking? Authorizing Provider   aspirin 81 mg EC tablet Take 1 tablet (81 mg) by mouth once daily at bedtime.   Yes Historical Provider, MD   cholecalciferol (D3-2000) 50 mcg (2,000 unit) capsule Take 1 capsule (50 mcg) by mouth once daily at bedtime.   Yes Historical Provider, MD   fexofenadine (Allegra Allergy) 180 mg tablet Take 1 tablet (180 mg) by mouth once daily as needed.   Yes Historical Provider, MD   fluticasone (Flonase) 50 mcg/actuation nasal spray Administer 1 spray into each nostril once daily as needed for allergies. 4/22/21  Yes Historical Provider, MD   hydroxychloroquine (Plaquenil) 200 mg tablet Take 1 tablet (200 mg) by mouth once daily in the morning. 6/4/24  Yes Historical Provider, MD   levothyroxine (Synthroid) 50 mcg tablet Take 1 tablet (50 mcg) by mouth 4 times a week. Every Monday/Tuesday/Thursday/Friday 6/27/23  Yes Historical Provider, MD   lisinopril 20 mg tablet Take 1 tablet (20 mg) by mouth once daily.   Yes Historical Provider, MD   metFORMIN  mg 24 hr tablet Take 1 tablet (500 mg) by mouth once daily in the evening. Take with meals. 9/12/23  Yes Historical Provider, MD   metoprolol succinate XL (Toprol-XL) 50 mg 24 hr tablet Take 1 tablet (50 mg) by mouth once daily. 9/10/24 9/10/25 Yes William Roman MD   omeprazole (PriLOSEC) 40 mg DR capsule Take 1 capsule (40 mg) by mouth if needed (as needed). 4/22/21   Historical Provider, MD        The list below  reflectives the updated allergy list. Please review each documented allergy for additional clarification and justification.  Allergies  Reviewed by Maximilian Zamorano RN on 9/19/2024        Severity Reactions Comments    Atorvastatin High Confusion, Blurred vision, Myalgia brain fog    Ciprofloxacin High Hives, Rash Rash from Cipro that was given for the Diverticular ds. Had seen dermatology at [facility].    Bactrim [sulfamethoxazole-trimethoprim] Not Specified Unknown Unknown reaction            Below are additional concerns with the patient's PTA list.      Jie Corrigan

## 2024-09-19 NOTE — H&P
History Of Present Illness  Nicki Paulson is a 79-year-old female with past medical history of hypertension, hyperlipidemia, diabetes mellitus, atrial fibrillation and valvular heart disease (No Ac, aspirin only), Graves' disease, Sjogren's, osteoporosis, multiple spinal surgeries w/ most recent 3/2024 , appendectomy, JONATHAN, and anxiety.  Patient presents with abdominal pain and bloody stools.  Reports abdominal pain has been generalized for proximately 1 week.  She felt that it was likely diverticulitis and has decreased her oral intake and was attempting to manage until seeing Dr. Ralph on Tuesday.  Yesterday evening she developed bloody bowel movements as well, describes as bright red with some darker clots.  She has never had blood in her stools with prior episodes of diverticulitis.  She has had a total of 4 bloody movements. denies any diarrhea.  Has not been having very many bowel movements due to decreased oral intake.  Reports nausea without vomiting.  No prior history of GI bleed.  She does take 400 to 600 mg ibuprofen only several times weekly.  Denies any fevers, chills, sweats, recent illness, chest pains, palpitations, cough, wheezing, shortness of breath, edema, wounds, or urinary complaints.  She did take her blood pressure medications prior to coming in this morning.  States she had a colonoscopy several years ago with Dr. Kim, she does not recall history of significant polyps.  Colonoscopy was reviewed and was done in August 2022 at which time diverticular disease was noted as well as an area with purulent drainage concerning for diverticulitis, however patient had been asymptomatic prior to going for colonoscopy.    ER course: Afebrile, hypertensive with SBP 190s to 200s/70s to 80s.  WBC 20.6, CBC otherwise.  INR 1.1. lipase 11, glucose 145, BUN 32, creatinine 1.24 (baseline 0.8-0.9). UA negative nitrates, small leukocyte esterase.    CT angio A/P negative for active GI bleed.  Noted diffuse  hyperemia, wall thickening, surrounding soft tissue stranding involving the descending colon from the level of the splenic flexure to the distal sigmoid colon detailed above.  Scattered diverticula without evidence of microperforation or adjacent abscess formation. Urine culture in process. Patient received Zosyn and 1 L normal saline bolus.    History: As above  Past surgical history: Saphenous vein ablation, Knee replacement, lumbar Lumbar laminectomy, b/l foraminotomy 2-4; T spine T12 laminectomy, cervical fusion, tonsillectomy, total abdominal hysterectomy, appendectomy, and bladder suspension surgery.  Social history: Non-smoker, no illicit drug use, alcohol abuse.  Family history: Noncontributory     Past Medical History  Past Medical History:   Diagnosis Date    Other chest pain     Chest pressure    Personal history of other endocrine, nutritional and metabolic disease     History of type 2 diabetes mellitus    Personal history of other specified conditions     History of palpitations    Varicose veins of unspecified lower extremity with other complications     Varicose veins of leg with complications       Surgical History  Past Surgical History:   Procedure Laterality Date    BLADDER SURGERY  07/05/2018    Bladder Surgery        Social History  She reports that she has never smoked. She has never used smokeless tobacco. She reports that she does not drink alcohol and does not use drugs.    Family History  No family history on file.     Allergies  Atorvastatin, Ciprofloxacin, and Bactrim [sulfamethoxazole-trimethoprim]    Review of Systems    10 point review of systems negative except as noted above in HPI    Physical Exam  Constitutional:       General: She is awake. She is not in acute distress.     Appearance: Normal appearance. She is not toxic-appearing.   HENT:      Head: Atraumatic.      Nose: Nose normal.      Mouth/Throat:      Mouth: Mucous membranes are moist.   Eyes:      Conjunctiva/sclera:  "Conjunctivae normal.   Cardiovascular:      Rate and Rhythm: Normal rate and regular rhythm.      Pulses: Normal pulses.      Heart sounds: No murmur heard.  Pulmonary:      Effort: Pulmonary effort is normal. No respiratory distress.      Breath sounds: Normal breath sounds.   Abdominal:      General: Bowel sounds are normal. There is no distension.      Palpations: Abdomen is soft.      Tenderness: There is abdominal tenderness. There is no guarding.   Musculoskeletal:         General: No swelling, deformity or signs of injury. Normal range of motion.      Cervical back: Neck supple.      Right lower leg: No edema.      Left lower leg: No edema.   Skin:     General: Skin is warm and dry.      Capillary Refill: Capillary refill takes less than 2 seconds.      Findings: No ecchymosis, erythema or wound.   Neurological:      General: No focal deficit present.      Mental Status: She is alert and oriented to person, place, and time.   Psychiatric:         Mood and Affect: Mood normal.         Behavior: Behavior is cooperative.          Last Recorded Vitals  Blood pressure (!) 198/81, pulse 83, temperature 36.3 °C (97.3 °F), temperature source Temporal, resp. rate (!) 25, height 1.575 m (5' 2\"), weight 61.2 kg (135 lb), SpO2 94%.    Relevant Results      Results for orders placed or performed during the hospital encounter of 09/19/24 (from the past 24 hour(s))   Urinalysis with Reflex Culture and Microscopic   Result Value Ref Range    Color, Urine Light-Yellow Light-Yellow, Yellow, Dark-Yellow    Appearance, Urine Clear Clear    Specific Gravity, Urine 1.012 1.005 - 1.035    pH, Urine 5.0 5.0, 5.5, 6.0, 6.5, 7.0, 7.5, 8.0    Protein, Urine NEGATIVE NEGATIVE, 10 (TRACE), 20 (TRACE) mg/dL    Glucose, Urine Normal Normal mg/dL    Blood, Urine 0.03 (TRACE) (A) NEGATIVE    Ketones, Urine NEGATIVE NEGATIVE mg/dL    Bilirubin, Urine NEGATIVE NEGATIVE    Urobilinogen, Urine Normal Normal mg/dL    Nitrite, Urine NEGATIVE " NEGATIVE    Leukocyte Esterase, Urine 25 Bren/µL (A) NEGATIVE   Microscopic Only, Urine   Result Value Ref Range    WBC, Urine 11-20 (A) 1-5, NONE /HPF    WBC Clumps, Urine OCCASIONAL Reference range not established. /HPF    RBC, Urine 1-2 NONE, 1-2, 3-5 /HPF    Bacteria, Urine 1+ (A) NONE SEEN /HPF    Mucus, Urine FEW Reference range not established. /LPF   CBC and Auto Differential   Result Value Ref Range    WBC 20.6 (H) 4.4 - 11.3 x10*3/uL    nRBC 0.0 0.0 - 0.0 /100 WBCs    RBC 4.85 4.00 - 5.20 x10*6/uL    Hemoglobin 14.2 12.0 - 16.0 g/dL    Hematocrit 42.8 36.0 - 46.0 %    MCV 88 80 - 100 fL    MCH 29.3 26.0 - 34.0 pg    MCHC 33.2 32.0 - 36.0 g/dL    RDW 13.2 11.5 - 14.5 %    Platelets 296 150 - 450 x10*3/uL    Neutrophils % 79.1 40.0 - 80.0 %    Immature Granulocytes %, Automated 0.6 0.0 - 0.9 %    Lymphocytes % 12.2 13.0 - 44.0 %    Monocytes % 8.0 2.0 - 10.0 %    Eosinophils % 0.0 0.0 - 6.0 %    Basophils % 0.1 0.0 - 2.0 %    Neutrophils Absolute 16.25 (H) 1.60 - 5.50 x10*3/uL    Immature Granulocytes Absolute, Automated 0.12 0.00 - 0.50 x10*3/uL    Lymphocytes Absolute 2.51 0.80 - 3.00 x10*3/uL    Monocytes Absolute 1.65 (H) 0.05 - 0.80 x10*3/uL    Eosinophils Absolute 0.00 0.00 - 0.40 x10*3/uL    Basophils Absolute 0.03 0.00 - 0.10 x10*3/uL   Magnesium   Result Value Ref Range    Magnesium 2.01 1.60 - 2.40 mg/dL   Comprehensive metabolic panel   Result Value Ref Range    Glucose 145 (H) 74 - 99 mg/dL    Sodium 138 136 - 145 mmol/L    Potassium 4.1 3.5 - 5.3 mmol/L    Chloride 103 98 - 107 mmol/L    Bicarbonate 24 21 - 32 mmol/L    Anion Gap 15 10 - 20 mmol/L    Urea Nitrogen 32 (H) 6 - 23 mg/dL    Creatinine 1.24 (H) 0.50 - 1.05 mg/dL    eGFR 44 (L) >60 mL/min/1.73m*2    Calcium 9.4 8.6 - 10.3 mg/dL    Albumin 4.3 3.4 - 5.0 g/dL    Alkaline Phosphatase 83 33 - 136 U/L    Total Protein 7.7 6.4 - 8.2 g/dL    AST 19 9 - 39 U/L    Bilirubin, Total 0.8 0.0 - 1.2 mg/dL    ALT 17 7 - 45 U/L   Lipase   Result Value  Ref Range    Lipase 11 9 - 82 U/L   Protime-INR   Result Value Ref Range    Protime 12.8 9.8 - 12.8 seconds    INR 1.1 0.9 - 1.1     .     Assessment/Plan   Assessment & Plan  UTI (urinary tract infection), bacterial  79-year-old female with past medical history of hypertension, hyperlipidemia, diabetes mellitus, atrial fibrillation and valvular heart disease (No Ac, aspirin only), Graves' disease, Sjogren's, osteoporosis, multiple spinal surgeries w/ most recent 3/2024 , appendectomy, JONATHAN, and anxiety.  Patient presents with abdominal pain and bloody stools.     #Suspected ischemic colitis versus possible mild diverticulitis    Telemetry monitoring  Continue with Zosyn empirically  If patient does develop diarrhea would collect stool samples  Consult gastroenterology, appreciate input.    Trend H&H, transfuse for hemoglobin less than 7 or patient becomes symptomatic.  Monitor for overt bleeding  Tylenol and tramadol as needed for pain  Clear liquid diet today n.p.o. after midnight  Hold aspirin  CBC and BMP in a.m.    #Renal insufficiency  IV fluids and repeat labs    Chronic issues:  #Hypertension  #Hyperlipidemia  #Non-insulin-dependent type 2 diabetes mellitus  #History of atrial fibrillation not on AC, currently sinus rhythm  #History of valvular heart disease  #Graves' disease  #Sjogren's  #Osteoporosis   #Several spinal surgeries    Continue with patient's home medications as appropriate  Sliding scale insulin while hospitalized  Hypoglycemia protocol    #DVT prophylaxis  SCDs  Hold chemoprophylaxis    Serge Eason, APRN-CNP  Patient fully evaluated and plan as above

## 2024-09-20 LAB
ANION GAP SERPL CALC-SCNC: 15 MMOL/L (ref 10–20)
ATRIAL RATE: 80 BPM
BACTERIA UR CULT: NO GROWTH
BUN SERPL-MCNC: 19 MG/DL (ref 6–23)
CALCIUM SERPL-MCNC: 8.5 MG/DL (ref 8.6–10.3)
CHLORIDE SERPL-SCNC: 109 MMOL/L (ref 98–107)
CO2 SERPL-SCNC: 22 MMOL/L (ref 21–32)
CREAT SERPL-MCNC: 0.81 MG/DL (ref 0.5–1.05)
EGFRCR SERPLBLD CKD-EPI 2021: 74 ML/MIN/1.73M*2
ERYTHROCYTE [DISTWIDTH] IN BLOOD BY AUTOMATED COUNT: 13.5 % (ref 11.5–14.5)
GLUCOSE BLD MANUAL STRIP-MCNC: 114 MG/DL (ref 74–99)
GLUCOSE BLD MANUAL STRIP-MCNC: 136 MG/DL (ref 74–99)
GLUCOSE SERPL-MCNC: 137 MG/DL (ref 74–99)
HCT VFR BLD AUTO: 37.4 % (ref 36–46)
HGB BLD-MCNC: 12 G/DL (ref 12–16)
MCH RBC QN AUTO: 28.1 PG (ref 26–34)
MCHC RBC AUTO-ENTMCNC: 32.1 G/DL (ref 32–36)
MCV RBC AUTO: 88 FL (ref 80–100)
NRBC BLD-RTO: 0 /100 WBCS (ref 0–0)
P AXIS: 63 DEGREES
P OFFSET: 203 MS
P ONSET: 140 MS
PLATELET # BLD AUTO: 286 X10*3/UL (ref 150–450)
POTASSIUM SERPL-SCNC: 3.3 MMOL/L (ref 3.5–5.3)
PR INTERVAL: 158 MS
Q ONSET: 219 MS
QRS COUNT: 13 BEATS
QRS DURATION: 84 MS
QT INTERVAL: 406 MS
QTC CALCULATION(BAZETT): 468 MS
QTC FREDERICIA: 447 MS
R AXIS: 56 DEGREES
RBC # BLD AUTO: 4.27 X10*6/UL (ref 4–5.2)
SODIUM SERPL-SCNC: 143 MMOL/L (ref 136–145)
T AXIS: 53 DEGREES
T OFFSET: 422 MS
VENTRICULAR RATE: 80 BPM
WBC # BLD AUTO: 19.8 X10*3/UL (ref 4.4–11.3)

## 2024-09-20 PROCEDURE — 82947 ASSAY GLUCOSE BLOOD QUANT: CPT

## 2024-09-20 PROCEDURE — 2500000004 HC RX 250 GENERAL PHARMACY W/ HCPCS (ALT 636 FOR OP/ED): Performed by: NURSE PRACTITIONER

## 2024-09-20 PROCEDURE — 93010 ELECTROCARDIOGRAM REPORT: CPT | Performed by: INTERNAL MEDICINE

## 2024-09-20 PROCEDURE — 80048 BASIC METABOLIC PNL TOTAL CA: CPT | Performed by: NURSE PRACTITIONER

## 2024-09-20 PROCEDURE — 36415 COLL VENOUS BLD VENIPUNCTURE: CPT | Performed by: NURSE PRACTITIONER

## 2024-09-20 PROCEDURE — 2500000004 HC RX 250 GENERAL PHARMACY W/ HCPCS (ALT 636 FOR OP/ED): Performed by: STUDENT IN AN ORGANIZED HEALTH CARE EDUCATION/TRAINING PROGRAM

## 2024-09-20 PROCEDURE — 99222 1ST HOSP IP/OBS MODERATE 55: CPT | Performed by: STUDENT IN AN ORGANIZED HEALTH CARE EDUCATION/TRAINING PROGRAM

## 2024-09-20 PROCEDURE — 2060000001 HC INTERMEDIATE ICU ROOM DAILY

## 2024-09-20 PROCEDURE — 2500000001 HC RX 250 WO HCPCS SELF ADMINISTERED DRUGS (ALT 637 FOR MEDICARE OP): Performed by: NURSE PRACTITIONER

## 2024-09-20 PROCEDURE — 2500000004 HC RX 250 GENERAL PHARMACY W/ HCPCS (ALT 636 FOR OP/ED)

## 2024-09-20 PROCEDURE — 85027 COMPLETE CBC AUTOMATED: CPT | Performed by: NURSE PRACTITIONER

## 2024-09-20 PROCEDURE — 2500000002 HC RX 250 W HCPCS SELF ADMINISTERED DRUGS (ALT 637 FOR MEDICARE OP, ALT 636 FOR OP/ED): Performed by: STUDENT IN AN ORGANIZED HEALTH CARE EDUCATION/TRAINING PROGRAM

## 2024-09-20 PROCEDURE — 2500000001 HC RX 250 WO HCPCS SELF ADMINISTERED DRUGS (ALT 637 FOR MEDICARE OP): Performed by: STUDENT IN AN ORGANIZED HEALTH CARE EDUCATION/TRAINING PROGRAM

## 2024-09-20 PROCEDURE — 2500000005 HC RX 250 GENERAL PHARMACY W/O HCPCS

## 2024-09-20 RX ORDER — DIGOXIN 0.25 MG/ML
250 INJECTION INTRAMUSCULAR; INTRAVENOUS ONCE
Status: COMPLETED | OUTPATIENT
Start: 2024-09-20 | End: 2024-09-20

## 2024-09-20 RX ORDER — DILTIAZEM HYDROCHLORIDE 5 MG/ML
20 INJECTION INTRAVENOUS ONCE
Status: COMPLETED | OUTPATIENT
Start: 2024-09-20 | End: 2024-09-20

## 2024-09-20 RX ORDER — POTASSIUM CHLORIDE 20 MEQ/1
40 TABLET, EXTENDED RELEASE ORAL ONCE
Status: COMPLETED | OUTPATIENT
Start: 2024-09-20 | End: 2024-09-20

## 2024-09-20 RX ORDER — DILTIAZEM HCL/D5W 125 MG/125
5-15 PLASTIC BAG, INJECTION (ML) INTRAVENOUS CONTINUOUS
Status: DISCONTINUED | OUTPATIENT
Start: 2024-09-20 | End: 2024-09-23 | Stop reason: HOSPADM

## 2024-09-20 RX ORDER — DIGOXIN 0.25 MG/ML
500 INJECTION INTRAMUSCULAR; INTRAVENOUS ONCE
Status: COMPLETED | OUTPATIENT
Start: 2024-09-20 | End: 2024-09-20

## 2024-09-20 RX ORDER — METOPROLOL SUCCINATE 50 MG/1
50 TABLET, EXTENDED RELEASE ORAL 2 TIMES DAILY
Status: DISCONTINUED | OUTPATIENT
Start: 2024-09-20 | End: 2024-09-21

## 2024-09-20 RX ORDER — METOPROLOL TARTRATE 1 MG/ML
5 INJECTION, SOLUTION INTRAVENOUS ONCE
Status: COMPLETED | OUTPATIENT
Start: 2024-09-20 | End: 2024-09-20

## 2024-09-20 ASSESSMENT — COGNITIVE AND FUNCTIONAL STATUS - GENERAL
MOBILITY SCORE: 24
DRESSING REGULAR LOWER BODY CLOTHING: A LITTLE
DAILY ACTIVITIY SCORE: 23

## 2024-09-20 ASSESSMENT — ENCOUNTER SYMPTOMS
ENDOCRINE NEGATIVE: 1
ABDOMINAL PAIN: 1
PSYCHIATRIC NEGATIVE: 1
CONSTITUTIONAL NEGATIVE: 1
MUSCULOSKELETAL NEGATIVE: 1
HEMATOLOGIC/LYMPHATIC NEGATIVE: 1
PND: 0
PALPITATIONS: 1
DYSPNEA ON EXERTION: 0
ORTHOPNEA: 0
NEAR-SYNCOPE: 0
SYNCOPE: 0
RESPIRATORY NEGATIVE: 1
ALLERGIC/IMMUNOLOGIC NEGATIVE: 1
EYES NEGATIVE: 1
NEUROLOGICAL NEGATIVE: 1

## 2024-09-20 ASSESSMENT — PAIN SCALES - GENERAL
PAINLEVEL_OUTOF10: 0 - NO PAIN
PAINLEVEL_OUTOF10: 0 - NO PAIN

## 2024-09-20 ASSESSMENT — PAIN - FUNCTIONAL ASSESSMENT: PAIN_FUNCTIONAL_ASSESSMENT: 0-10

## 2024-09-20 NOTE — CONSULTS
Cardiology Consultation- New Consult    Inpatient consult to Cardiology  Consult performed by: Jordan Kamara MD  Consult ordered by: Edith Gauthier, APRN-CNP        HPI: Nicki Paulson is a 79 y.o.  female who presented with abdominal pain, bloody stools. Past medical history of paroxysmal atrial fibrillation (not on anticoagulation), hypertension, dyslipidemia, diabetes mellitus, Graves' disease, Sjogren's, osteoporosis, multiple spinal surgery, appendectomy, total abdominal hysterectomy, diverticulosis/history of diverticulitis, and anxiety.    Patient presented to the hospital with abdominal pain and bloody stools.  In the ED, patient was hypertensive with systolic blood pressure 1 /70-80.  WBC 20K; INR 1.1 serum creatinine 1.24 (baseline 0.8-0.9).  CT imaging showed diffuse wall thickening at the splenic flexure to the distal sigmoid colon; GI consulted.  Findings suggestive of ischemic colitis.  Plan per GI is for diagnostic sigmoidoscopy.  Cardiology consulted for A-fib with RVR.  Patient placed on diltiazem GGTSanjana Caceres was seen at bedside by myself in the cardiology consult service on 9/20/2024.  Patient remains suboptimally rate controlled with heart rates in the 130s/140s.  Recommended IV digoxin loading; order placed per cardiology.  Of note patient had an episode of atrial fibrillation postoperatively in 2018 after right knee surgery; patient was on a short course of amiodarone and apixaban which was later stopped as patient had not had any recurrence of her atrial fibrillation.    Past Medical History:   - As above    Surgical History:   She has a past surgical history that includes Bladder surgery (07/05/2018).    Family History:   - Reviewed; non-contributory     Allergies:  Atorvastatin, Ciprofloxacin, and Bactrim [sulfamethoxazole-trimethoprim]     Social History:   - non-smoker; no illicit drug use    Prior Cardiovascular Testing (Personally Reviewed):         Review  of Systems:  Review of Systems   Constitutional: Negative.   HENT: Negative.     Eyes: Negative.    Cardiovascular:  Positive for palpitations. Negative for chest pain, dyspnea on exertion, near-syncope, orthopnea, paroxysmal nocturnal dyspnea and syncope.   Respiratory: Negative.     Endocrine: Negative.    Hematologic/Lymphatic: Negative.    Skin: Negative.    Musculoskeletal: Negative.    Gastrointestinal:  Positive for abdominal pain and melena.   Genitourinary: Negative.    Neurological: Negative.    Psychiatric/Behavioral: Negative.     Allergic/Immunologic: Negative.        Objective     Outpatient Medications:    Current Facility-Administered Medications:     acetaminophen (Tylenol) tablet 650 mg, 650 mg, oral, q4h PRN **OR** acetaminophen (Tylenol) oral liquid 650 mg, 650 mg, nasogastric tube, q4h PRN **OR** acetaminophen (Tylenol) suppository 650 mg, 650 mg, rectal, q4h PRN, BRAULIO Singh    [Held by provider] aspirin EC tablet 81 mg, 81 mg, oral, Nightly, BRAULIO Singh    cetirizine (ZyrTEC) tablet 10 mg, 10 mg, oral, Daily PRN, BRAULIO Singh    cholecalciferol (Vitamin D-3) tablet 2,000 Units, 2,000 Units, oral, Nightly, BRAULIO Singh, 2,000 Units at 09/19/24 2043    dextrose 50 % injection 12.5 g, 12.5 g, intravenous, q15 min PRN, BRAULIO Singh    dextrose 50 % injection 25 g, 25 g, intravenous, q15 min PRN, BRAULIO Singh    digoxin (Lanoxin) injection 250 mcg, 250 mcg, intravenous, Once, Jordan Kamara MD    digoxin (Lanoxin) injection 250 mcg, 250 mcg, intravenous, Once, Jordan Kamara MD    dilTIAZem (Cardizem) 125 mg in dextrose 5% 125 mL (1 mg/mL) infusion (premix), 5-15 mg/hr, intravenous, Continuous, BRAULIO Melo, Last Rate: 15 mL/hr at 09/20/24 1222, 15 mg/hr at 09/20/24 1222    fluticasone (Flonase) nasal spray 1 spray, 1 spray, Each Nostril, Daily PRN, eSrge Eason, APRN-CNP    glucagon (Glucagen) injection  1 mg, 1 mg, intramuscular, q15 min PRN, JULIA Singh-CNP    glucagon (Glucagen) injection 1 mg, 1 mg, intramuscular, q15 min PRN, Serge Eason APRN-CNP    hydrALAZINE (Apresoline) injection 5 mg, 5 mg, intravenous, q6h PRN, Serge Eason APRN-CNP, 5 mg at 09/19/24 2219    hydroxychloroquine (Plaquenil) tablet 200 mg, 200 mg, oral, q AM, JULIA Singh-CNP    insulin lispro (HumaLOG) injection 0-10 Units, 0-10 Units, subcutaneous, q6h, Serge Eason APRN-CNP    lactated Ringer's infusion, 75 mL/hr, intravenous, Continuous, BRAULIO Singh, Last Rate: 75 mL/hr at 09/20/24 1023, 75 mL/hr at 09/20/24 1023    levothyroxine (Synthroid, Levoxyl) tablet 50 mcg, 50 mcg, oral, Once per day on Monday Tuesday Thursday Friday, Serge Eason APRN-CNP, 50 mcg at 09/20/24 0602    lisinopril tablet 20 mg, 20 mg, oral, Daily, JULIA Singh-CNP, 20 mg at 09/20/24 1030    metoprolol succinate XL (Toprol-XL) 24 hr tablet 50 mg, 50 mg, oral, Daily, Serge Eason APRN-CNP, 50 mg at 09/20/24 1031    ondansetron (Zofran) injection 4 mg, 4 mg, intravenous, q6h PRN, Serge Eason APRN-CNP    pantoprazole (ProtoNix) EC tablet 40 mg, 40 mg, oral, Daily before breakfast, JULIA Singh-CNP, 40 mg at 09/20/24 0602    piperacillin-tazobactam (Zosyn) 3.375 g in dextrose (iso) IV 50 mL, 3.375 g, intravenous, q6h, Serge Eason APRN-CNP, Stopped at 09/20/24 1057    traMADol (Ultram) tablet 50 mg, 50 mg, oral, q8h PRN, Serge Eason APRN-CNP     Inpatient Medications:  Scheduled medications   Medication Dose Route Frequency    [Held by provider] aspirin  81 mg oral Nightly    cholecalciferol  2,000 Units oral Nightly    digoxin  250 mcg intravenous Once    digoxin  250 mcg intravenous Once    hydroxychloroquine  200 mg oral q AM    insulin lispro  0-10 Units subcutaneous q6h    levothyroxine  50 mcg oral Once per day on Monday Tuesday Thursday Friday    lisinopril  20 mg oral Daily    metoprolol succinate XL  50  "mg oral Daily    pantoprazole  40 mg oral Daily before breakfast    piperacillin-tazobactam  3.375 g intravenous q6h       PRN medications   Medication    acetaminophen    Or    acetaminophen    Or    acetaminophen    cetirizine    dextrose    dextrose    fluticasone    glucagon    glucagon    hydrALAZINE    ondansetron    traMADol       Continuous Medications   Medication Dose Last Rate    dilTIAZem  5-15 mg/hr 15 mg/hr (09/20/24 1222)    lactated Ringer's  75 mL/hr 75 mL/hr (09/20/24 1023)       Last Recorded Vitals  /60   Pulse (!) 147   Temp 36.4 °C (97.5 °F)   Resp 20   Ht 1.575 m (5' 2\")   Wt 62.5 kg (137 lb 11.2 oz)   SpO2 93%   BMI 25.19 kg/m²     Physical Exam:    Physical Exam  Constitutional:       General: She is not in acute distress.  HENT:      Head: Normocephalic.      Mouth/Throat:      Mouth: Mucous membranes are moist.   Eyes:      Extraocular Movements: Extraocular movements intact.      Conjunctiva/sclera: Conjunctivae normal.   Neck:      Vascular: No carotid bruit or JVD.   Cardiovascular:      Rate and Rhythm: Tachycardia present. Rhythm irregularly irregular.      Pulses: Normal pulses.      Heart sounds: No murmur heard.  Pulmonary:      Effort: Pulmonary effort is normal. No respiratory distress.      Breath sounds: Normal breath sounds.   Abdominal:      General: Bowel sounds are normal. There is no distension.      Palpations: Abdomen is soft.   Musculoskeletal:         General: No swelling.   Skin:     General: Skin is warm and dry.   Neurological:      General: No focal deficit present.      Mental Status: She is alert.      Cranial Nerves: No cranial nerve deficit.      Motor: No weakness.   Psychiatric:         Mood and Affect: Mood normal.         Behavior: Behavior normal.         Intake / Output Summary:     Intake/Output Summary (Last 24 hours) at 9/20/2024 1310  Last data filed at 9/20/2024 0511  Gross per 24 hour   Intake 1794.83 ml   Output --   Net 1794.83 ml "       Net IO Since Admission: 1,794.83 mL [09/20/24 1310]    Lab/Radiology/Diagnostic Review:    Labs  Results for orders placed or performed during the hospital encounter of 09/19/24 (from the past 24 hour(s))   Lactate   Result Value Ref Range    Lactate 1.3 0.4 - 2.0 mmol/L   CBC   Result Value Ref Range    WBC 19.8 (H) 4.4 - 11.3 x10*3/uL    nRBC 0.0 0.0 - 0.0 /100 WBCs    RBC 4.27 4.00 - 5.20 x10*6/uL    Hemoglobin 12.0 12.0 - 16.0 g/dL    Hematocrit 37.4 36.0 - 46.0 %    MCV 88 80 - 100 fL    MCH 28.1 26.0 - 34.0 pg    MCHC 32.1 32.0 - 36.0 g/dL    RDW 13.5 11.5 - 14.5 %    Platelets 286 150 - 450 x10*3/uL   Basic metabolic panel   Result Value Ref Range    Glucose 137 (H) 74 - 99 mg/dL    Sodium 143 136 - 145 mmol/L    Potassium 3.3 (L) 3.5 - 5.3 mmol/L    Chloride 109 (H) 98 - 107 mmol/L    Bicarbonate 22 21 - 32 mmol/L    Anion Gap 15 10 - 20 mmol/L    Urea Nitrogen 19 6 - 23 mg/dL    Creatinine 0.81 0.50 - 1.05 mg/dL    eGFR 74 >60 mL/min/1.73m*2    Calcium 8.5 (L) 8.6 - 10.3 mg/dL   POCT GLUCOSE   Result Value Ref Range    POCT Glucose 136 (H) 74 - 99 mg/dL       Peripheral IV 09/19/24 20 G Left Antecubital (Active)   Placement Date/Time: 09/19/24 1101   Size (Gauge): 20 G  Orientation: Left  Location: Antecubital   Number of days: 1       Peripheral IV 09/20/24 22 G Right;Posterior Forearm (Active)   Placement Date/Time: 09/20/24 0422   Size (Gauge): 22 G  Orientation: Right;Posterior  Location: Forearm  Local Anesthetic: None  Technique: Anatomical landmarks  Placed by: chinese  Insertion attempts: 1  Patient Tolerance: Fair   Number of days: 0       Peripheral IV 20 G Left Antecubital (Active)   Earliest Known Present: 09/19/24   Hand Hygiene Completed: Yes  Size (Gauge): 20 G  Orientation: Left  Location: Antecubital  Site Prep: Alcohol  Local Anesthetic: None  Technique: Anatomical landmarks  Insertion attempts: 1  Patient Tolerance: Fair   Number of days: 1        Hemoglobin A1C   Date/Time Value  Ref Range Status   06/04/2024 11:33 AM 6.2 (H) 4.0 - 5.6 % Final   08/14/2023 11:02 AM 6.1 (H) 4.0 - 5.6 % Final       Assessment:   79 y.o.  female who presented with abdominal pain, bloody stools. Past medical history of paroxysmal atrial fibrillation (not on anticoagulation), hypertension, dyslipidemia, diabetes mellitus, Graves' disease, Sjogren's, osteoporosis, multiple spinal surgery, appendectomy, total abdominal hysterectomy, diverticulosis/history of diverticulitis, and anxiety.    Patient with abdominal pain with evidence of bowel thickening on CT imaging suggestive of ischemic colitis.  Further evaluation with endoscopy as per GI.  Given melena/bloody bowel movements we will defer systemic anticoagulation at this time.  Given elevated [id number] would recommend systemic anticoagulation once acceptable risk from a GI bleeding standpoint.    We will continue rate control with diltiazem GGT; patient to be loaded with IV digoxin today, would then defer any additional digoxin in light of hydroxychloroquine.  Avoid hypokalemia and hypomagnesemia.  Will uptitrate beta-blockade as tolerated; increase metoprolol XL to 50 mg twice daily.  Atrial fibrillation exacerbated by underlying abdominal pain, colitis.  Treat underlying exacerbating factors.    Overall Recommendations:  1.  Paroxysmal atrial fibrillation  - Likely exacerbated by abdominal pain, colitis  - Will defer systemic anticoagulation at this time in light of recent melena; given elevated [id number] recommend systemic anticoagulation with DOAC once acceptable risk from a GI bleeding standpoint  - Will continue rate control diltiazem GGT  - Load with IV digoxin as above  - Uptitrate beta-blockade as tolerated; increase metoprolol XL to 50 mg twice daily    2.  Colitis  - Possibly ischemic  - ?  Cardioembolic in setting of atrial fibrillation  - Further workup with endoscopy as per GI    3.  Hypertension  - Continue lisinopril, metoprolol  -  Hold antihypertensive therapy for MAP less than 60    Thank you for the cardiology consult. We will follow with you.     Jordan Kamara MD

## 2024-09-20 NOTE — CARE PLAN
The patient's goals for the shift include rest    The clinical goals for the shift include remain HD stable; complete bowel prep    Barriers to progression include GIB. Recommendations to address these barriers include administer medications and treatments as ordered, monitor VS.      Problem: Pain - Adult  Goal: Verbalizes/displays adequate comfort level or baseline comfort level  Outcome: Progressing     Problem: Safety - Adult  Goal: Free from fall injury  Outcome: Progressing     Problem: Discharge Planning  Goal: Discharge to home or other facility with appropriate resources  Outcome: Progressing     Problem: Chronic Conditions and Co-morbidities  Goal: Patient's chronic conditions and co-morbidity symptoms are monitored and maintained or improved  Outcome: Progressing

## 2024-09-20 NOTE — CARE PLAN
The patient's goals for the shift include rest    The clinical goals for the shift include achieve controlled HR  Problem: Safety - Adult  Goal: Free from fall injury  Outcome: Progressing  Flowsheets (Taken 9/20/2024 1528)  Free from fall injury: Instruct family/caregiver on patient safety

## 2024-09-20 NOTE — PROGRESS NOTES
This note was created using voice recognition transcription software. Despite proofreading, unintentional typographical errors may be present. Please contact the GI office with any questions or concerns.       Subjective  Bleeding stopped once she started prep.  Having heart palpitations.  No abdominal pain.         Physical Exam:  General: Polite, calm, resting  Skin:  Warm and dry, no jaundice  HEENT: No scleral icterus, no conjunctival pallor, normocephalic, atraumatic, mucous membranes moist  Neck:  atraumatic, trachea midline, no JVD  Chest:  Clear to auscultation bilaterally. No wheezes, rales, or rhonchi  CV:  Regular rate and rhythm.  Positive S1/S2  Abdomen: no distension, +BS, soft, non-tender to palpation, no rebound tenderness, no guarding, no rigidity, no discernible ascites   Extremities: no lower extremity edema, chronic pigmentary changes, no cyanosis  Neurological:  A&Ox3, no asterixis  Psychiatric: cooperative     Investigations:  Labs, radiological imaging and cardiac work up were reviewed        Objective:         9/20/2024     3:49 AM 9/20/2024     4:00 AM 9/20/2024     4:36 AM 9/20/2024     6:12 AM 9/20/2024     8:27 AM 9/20/2024     9:07 AM 9/20/2024    10:30 AM   Vitals   Systolic 174 114 128 127 88 134 141   Diastolic 65 59 72 81 62 71 60   Heart Rate 148 150 162 144 114 147    Temp   36 °C (96.8 °F) 35.7 °C (96.3 °F) 36.4 °C (97.5 °F)     Resp 18 20               Medications:  [Held by provider] aspirin, 81 mg, oral, Nightly  cholecalciferol, 2,000 Units, oral, Nightly  digoxin, 250 mcg, intravenous, Once  digoxin, 250 mcg, intravenous, Once  hydroxychloroquine, 200 mg, oral, q AM  insulin lispro, 0-10 Units, subcutaneous, q6h  levothyroxine, 50 mcg, oral, Once per day on Monday Tuesday Thursday Friday  lisinopril, 20 mg, oral, Daily  metoprolol succinate XL, 50 mg, oral, BID  pantoprazole, 40 mg, oral, Daily before breakfast  piperacillin-tazobactam, 3.375 g, intravenous, q6h          Recent Results (from the past 72 hour(s))   Urinalysis with Reflex Culture and Microscopic    Collection Time: 09/19/24 10:53 AM   Result Value Ref Range    Color, Urine Light-Yellow Light-Yellow, Yellow, Dark-Yellow    Appearance, Urine Clear Clear    Specific Gravity, Urine 1.012 1.005 - 1.035    pH, Urine 5.0 5.0, 5.5, 6.0, 6.5, 7.0, 7.5, 8.0    Protein, Urine NEGATIVE NEGATIVE, 10 (TRACE), 20 (TRACE) mg/dL    Glucose, Urine Normal Normal mg/dL    Blood, Urine 0.03 (TRACE) (A) NEGATIVE    Ketones, Urine NEGATIVE NEGATIVE mg/dL    Bilirubin, Urine NEGATIVE NEGATIVE    Urobilinogen, Urine Normal Normal mg/dL    Nitrite, Urine NEGATIVE NEGATIVE    Leukocyte Esterase, Urine 25 Bren/µL (A) NEGATIVE   Extra Urine Gray Tube    Collection Time: 09/19/24 10:53 AM   Result Value Ref Range    Extra Tube Hold for add-ons.    Microscopic Only, Urine    Collection Time: 09/19/24 10:53 AM   Result Value Ref Range    WBC, Urine 11-20 (A) 1-5, NONE /HPF    WBC Clumps, Urine OCCASIONAL Reference range not established. /HPF    RBC, Urine 1-2 NONE, 1-2, 3-5 /HPF    Bacteria, Urine 1+ (A) NONE SEEN /HPF    Mucus, Urine FEW Reference range not established. /LPF   Urine Culture    Collection Time: 09/19/24 10:53 AM    Specimen: Clean Catch/Voided; Urine   Result Value Ref Range    Urine Culture No growth    CBC and Auto Differential    Collection Time: 09/19/24 11:01 AM   Result Value Ref Range    WBC 20.6 (H) 4.4 - 11.3 x10*3/uL    nRBC 0.0 0.0 - 0.0 /100 WBCs    RBC 4.85 4.00 - 5.20 x10*6/uL    Hemoglobin 14.2 12.0 - 16.0 g/dL    Hematocrit 42.8 36.0 - 46.0 %    MCV 88 80 - 100 fL    MCH 29.3 26.0 - 34.0 pg    MCHC 33.2 32.0 - 36.0 g/dL    RDW 13.2 11.5 - 14.5 %    Platelets 296 150 - 450 x10*3/uL    Neutrophils % 79.1 40.0 - 80.0 %    Immature Granulocytes %, Automated 0.6 0.0 - 0.9 %    Lymphocytes % 12.2 13.0 - 44.0 %    Monocytes % 8.0 2.0 - 10.0 %    Eosinophils % 0.0 0.0 - 6.0 %    Basophils % 0.1 0.0 - 2.0 %    Neutrophils  Absolute 16.25 (H) 1.60 - 5.50 x10*3/uL    Immature Granulocytes Absolute, Automated 0.12 0.00 - 0.50 x10*3/uL    Lymphocytes Absolute 2.51 0.80 - 3.00 x10*3/uL    Monocytes Absolute 1.65 (H) 0.05 - 0.80 x10*3/uL    Eosinophils Absolute 0.00 0.00 - 0.40 x10*3/uL    Basophils Absolute 0.03 0.00 - 0.10 x10*3/uL   Magnesium    Collection Time: 09/19/24 11:01 AM   Result Value Ref Range    Magnesium 2.01 1.60 - 2.40 mg/dL   Comprehensive metabolic panel    Collection Time: 09/19/24 11:01 AM   Result Value Ref Range    Glucose 145 (H) 74 - 99 mg/dL    Sodium 138 136 - 145 mmol/L    Potassium 4.1 3.5 - 5.3 mmol/L    Chloride 103 98 - 107 mmol/L    Bicarbonate 24 21 - 32 mmol/L    Anion Gap 15 10 - 20 mmol/L    Urea Nitrogen 32 (H) 6 - 23 mg/dL    Creatinine 1.24 (H) 0.50 - 1.05 mg/dL    eGFR 44 (L) >60 mL/min/1.73m*2    Calcium 9.4 8.6 - 10.3 mg/dL    Albumin 4.3 3.4 - 5.0 g/dL    Alkaline Phosphatase 83 33 - 136 U/L    Total Protein 7.7 6.4 - 8.2 g/dL    AST 19 9 - 39 U/L    Bilirubin, Total 0.8 0.0 - 1.2 mg/dL    ALT 17 7 - 45 U/L   Lipase    Collection Time: 09/19/24 11:01 AM   Result Value Ref Range    Lipase 11 9 - 82 U/L   Protime-INR    Collection Time: 09/19/24 11:01 AM   Result Value Ref Range    Protime 12.8 9.8 - 12.8 seconds    INR 1.1 0.9 - 1.1   ECG 12 lead    Collection Time: 09/19/24 11:14 AM   Result Value Ref Range    Ventricular Rate 80 BPM    Atrial Rate 80 BPM    AZ Interval 158 ms    QRS Duration 84 ms    QT Interval 406 ms    QTC Calculation(Bazett) 468 ms    P Axis 63 degrees    R Axis 56 degrees    T Axis 53 degrees    QRS Count 13 beats    Q Onset 219 ms    P Onset 140 ms    P Offset 203 ms    T Offset 422 ms    QTC Fredericia 447 ms   Lactate    Collection Time: 09/19/24  8:34 PM   Result Value Ref Range    Lactate 1.3 0.4 - 2.0 mmol/L   CBC    Collection Time: 09/20/24  5:25 AM   Result Value Ref Range    WBC 19.8 (H) 4.4 - 11.3 x10*3/uL    nRBC 0.0 0.0 - 0.0 /100 WBCs    RBC 4.27 4.00 - 5.20  x10*6/uL    Hemoglobin 12.0 12.0 - 16.0 g/dL    Hematocrit 37.4 36.0 - 46.0 %    MCV 88 80 - 100 fL    MCH 28.1 26.0 - 34.0 pg    MCHC 32.1 32.0 - 36.0 g/dL    RDW 13.5 11.5 - 14.5 %    Platelets 286 150 - 450 x10*3/uL   Basic metabolic panel    Collection Time: 09/20/24  5:25 AM   Result Value Ref Range    Glucose 137 (H) 74 - 99 mg/dL    Sodium 143 136 - 145 mmol/L    Potassium 3.3 (L) 3.5 - 5.3 mmol/L    Chloride 109 (H) 98 - 107 mmol/L    Bicarbonate 22 21 - 32 mmol/L    Anion Gap 15 10 - 20 mmol/L    Urea Nitrogen 19 6 - 23 mg/dL    Creatinine 0.81 0.50 - 1.05 mg/dL    eGFR 74 >60 mL/min/1.73m*2    Calcium 8.5 (L) 8.6 - 10.3 mg/dL   POCT GLUCOSE    Collection Time: 09/20/24  6:09 AM   Result Value Ref Range    POCT Glucose 136 (H) 74 - 99 mg/dL          Assessment:  This is a 78 yo Female with a PMH of HTN, HLD, DM, Afib (previously on Eliquis),Graves' disease, Sjogren's, osteoporosis, multiple spinal surgeries w/ most recent 3/2024 , appendectomy, JONATHAN, anxiety, and valvular heart disease who presented to [facility] on 9/19/24 with reports of abdominal pain and bloody stools.  GI was consulted for ?ischemic colitis. Had rectal bleeding that stopped.  Planned for colonoscopy 9/20 but on 9/19, patient went into Afib RVR and currently has an elevated HR and is symptomatic.  Cardiology to evaluate the patient.  Since Hgb stable and no further overt bleeding, will suspend current inpatient plans for endoscopic evaluation and schedule her to follow up as an outpatient.          Plan  1.)  ?Ischemic colitis-Discussed monitoring blood pressure, avoid NSAIDs/constipation.  I will have my office reach out to her to schedule a colonoscopy.      Discussed patient with Dr. Rondon.  Will sign off.  I spent 35 minutes in the professional and overall care of this patient.      09/20/24 at 2:48 PM - JULIA Pickett-CNP

## 2024-09-20 NOTE — SIGNIFICANT EVENT
RN called stating patient's HR was in the 170s and in a fib per EKG. /74. This NP ordered Metoprolol 5mg IVP x1. About 20 minutes later, HR came down to 130s but back up to 160s, /59. Discussed with Yaneli, nursing supervisor that patient would need transfer to stepdown for Cardizem infusion. Yaneli stated there was a bed available. Transfer order placed along with the Cardizem bolus and infusion. AC held per H&P, resume as appropriate. Patient sees Dr. Roman outpatient. Cardiology consulted. Attending to follow.

## 2024-09-20 NOTE — PROGRESS NOTES
Nicki Paulson is a 79 y.o. female on day 1 of admission presenting with UTI (urinary tract infection), bacterial.      Subjective   Patient fully evaluated on September 20.  Continue to monitor H&H.  Heart rate still elevated.  Digoxin given earlier.  Recheck labs in AM.       Objective     Last Recorded Vitals  /61   Pulse (!) 119   Temp 36.4 °C (97.5 °F)   Resp 20   Wt 62.5 kg (137 lb 11.2 oz)   SpO2 93%   Intake/Output last 3 Shifts:    Intake/Output Summary (Last 24 hours) at 9/20/2024 1537  Last data filed at 9/20/2024 0511  Gross per 24 hour   Intake 694.83 ml   Output --   Net 694.83 ml       Admission Weight  Weight: 61.2 kg (135 lb) (09/19/24 1019)    Daily Weight  09/19/24 : 62.5 kg (137 lb 11.2 oz)    Image Results  ECG 12 lead  Normal sinus rhythm  Left ventricular hypertrophy with repolarization abnormality ( Sokolow-Evans )  Abnormal ECG  No previous ECGs available      Physical Exam    Relevant Results               Assessment/Plan   This patient currently has cardiac telemetry ordered; if you would like to modify or discontinue the telemetry order, click here to go to the orders activity to modify/discontinue the order.              Assessment & Plan  UTI (urinary tract infection), bacterial  79-year-old female with past medical history of hypertension, hyperlipidemia, diabetes mellitus, atrial fibrillation and valvular heart disease (No Ac, aspirin only), Graves' disease, Sjogren's, osteoporosis, multiple spinal surgeries w/ most recent 3/2024 , appendectomy, JONATHAN, and anxiety.  Patient presents with abdominal pain and bloody stools.         Tripp Dexter MD  Physician  Internal Medicine     H&P      Addendum     Date of Service: 9/19/2024  5:06 PM     Addendum       Expand All Collapse All    History Of Present Illness  Nicki Paulson is a 79-year-old female with past medical history of hypertension, hyperlipidemia, diabetes mellitus, atrial fibrillation and valvular heart disease  (No Ac, aspirin only), Graves' disease, Sjogren's, osteoporosis, multiple spinal surgeries w/ most recent 3/2024 , appendectomy, JONATHAN, and anxiety.  Patient presents with abdominal pain and bloody stools.  Reports abdominal pain has been generalized for proximately 1 week.  She felt that it was likely diverticulitis and has decreased her oral intake and was attempting to manage until seeing Dr. Ralph on Tuesday.  Yesterday evening she developed bloody bowel movements as well, describes as bright red with some darker clots.  She has never had blood in her stools with prior episodes of diverticulitis.  She has had a total of 4 bloody movements. denies any diarrhea.  Has not been having very many bowel movements due to decreased oral intake.  Reports nausea without vomiting.  No prior history of GI bleed.  She does take 400 to 600 mg ibuprofen only several times weekly.  Denies any fevers, chills, sweats, recent illness, chest pains, palpitations, cough, wheezing, shortness of breath, edema, wounds, or urinary complaints.  She did take her blood pressure medications prior to coming in this morning.  States she had a colonoscopy several years ago with Dr. Kim, she does not recall history of significant polyps.  Colonoscopy was reviewed and was done in August 2022 at which time diverticular disease was noted as well as an area with purulent drainage concerning for diverticulitis, however patient had been asymptomatic prior to going for colonoscopy.     ER course: Afebrile, hypertensive with SBP 190s to 200s/70s to 80s.  WBC 20.6, CBC otherwise.  INR 1.1. lipase 11, glucose 145, BUN 32, creatinine 1.24 (baseline 0.8-0.9). UA negative nitrates, small leukocyte esterase.     CT angio A/P negative for active GI bleed.  Noted diffuse hyperemia, wall thickening, surrounding soft tissue stranding involving the descending colon from the level of the splenic flexure to the distal sigmoid colon detailed above.  Scattered  diverticula without evidence of microperforation or adjacent abscess formation. Urine culture in process. Patient received Zosyn and 1 L normal saline bolus.     History: As above  Past surgical history: Saphenous vein ablation, Knee replacement, lumbar Lumbar laminectomy, b/l foraminotomy 2-4; T spine T12 laminectomy, cervical fusion, tonsillectomy, total abdominal hysterectomy, appendectomy, and bladder suspension surgery.  Social history: Non-smoker, no illicit drug use, alcohol abuse.  Family history: Noncontributory     Past Medical History  Medical History        Past Medical History:   Diagnosis Date    Other chest pain       Chest pressure    Personal history of other endocrine, nutritional and metabolic disease       History of type 2 diabetes mellitus    Personal history of other specified conditions       History of palpitations    Varicose veins of unspecified lower extremity with other complications       Varicose veins of leg with complications            Surgical History  Surgical History         Past Surgical History:   Procedure Laterality Date    BLADDER SURGERY   07/05/2018     Bladder Surgery            Social History  She reports that she has never smoked. She has never used smokeless tobacco. She reports that she does not drink alcohol and does not use drugs.     Family History  Family History   No family history on file.        Allergies  Atorvastatin, Ciprofloxacin, and Bactrim [sulfamethoxazole-trimethoprim]     Review of Systems     10 point review of systems negative except as noted above in HPI     Physical Exam  Constitutional:       General: She is awake. She is not in acute distress.     Appearance: Normal appearance. She is not toxic-appearing.   HENT:      Head: Atraumatic.      Nose: Nose normal.      Mouth/Throat:      Mouth: Mucous membranes are moist.   Eyes:      Conjunctiva/sclera: Conjunctivae normal.   Cardiovascular:      Rate and Rhythm: Normal rate and regular rhythm.       "Pulses: Normal pulses.      Heart sounds: No murmur heard.  Pulmonary:      Effort: Pulmonary effort is normal. No respiratory distress.      Breath sounds: Normal breath sounds.   Abdominal:      General: Bowel sounds are normal. There is no distension.      Palpations: Abdomen is soft.      Tenderness: There is abdominal tenderness. There is no guarding.   Musculoskeletal:         General: No swelling, deformity or signs of injury. Normal range of motion.      Cervical back: Neck supple.      Right lower leg: No edema.      Left lower leg: No edema.   Skin:     General: Skin is warm and dry.      Capillary Refill: Capillary refill takes less than 2 seconds.      Findings: No ecchymosis, erythema or wound.   Neurological:      General: No focal deficit present.      Mental Status: She is alert and oriented to person, place, and time.   Psychiatric:         Mood and Affect: Mood normal.         Behavior: Behavior is cooperative.            Last Recorded Vitals  Blood pressure (!) 198/81, pulse 83, temperature 36.3 °C (97.3 °F), temperature source Temporal, resp. rate (!) 25, height 1.575 m (5' 2\"), weight 61.2 kg (135 lb), SpO2 94%.     Relevant Results              Results for orders placed or performed during the hospital encounter of 09/19/24 (from the past 24 hour(s))   Urinalysis with Reflex Culture and Microscopic   Result Value Ref Range     Color, Urine Light-Yellow Light-Yellow, Yellow, Dark-Yellow     Appearance, Urine Clear Clear     Specific Gravity, Urine 1.012 1.005 - 1.035     pH, Urine 5.0 5.0, 5.5, 6.0, 6.5, 7.0, 7.5, 8.0     Protein, Urine NEGATIVE NEGATIVE, 10 (TRACE), 20 (TRACE) mg/dL     Glucose, Urine Normal Normal mg/dL     Blood, Urine 0.03 (TRACE) (A) NEGATIVE     Ketones, Urine NEGATIVE NEGATIVE mg/dL     Bilirubin, Urine NEGATIVE NEGATIVE     Urobilinogen, Urine Normal Normal mg/dL     Nitrite, Urine NEGATIVE NEGATIVE     Leukocyte Esterase, Urine 25 Bren/µL (A) NEGATIVE   Microscopic Only, " Urine   Result Value Ref Range     WBC, Urine 11-20 (A) 1-5, NONE /HPF     WBC Clumps, Urine OCCASIONAL Reference range not established. /HPF     RBC, Urine 1-2 NONE, 1-2, 3-5 /HPF     Bacteria, Urine 1+ (A) NONE SEEN /HPF     Mucus, Urine FEW Reference range not established. /LPF   CBC and Auto Differential   Result Value Ref Range     WBC 20.6 (H) 4.4 - 11.3 x10*3/uL     nRBC 0.0 0.0 - 0.0 /100 WBCs     RBC 4.85 4.00 - 5.20 x10*6/uL     Hemoglobin 14.2 12.0 - 16.0 g/dL     Hematocrit 42.8 36.0 - 46.0 %     MCV 88 80 - 100 fL     MCH 29.3 26.0 - 34.0 pg     MCHC 33.2 32.0 - 36.0 g/dL     RDW 13.2 11.5 - 14.5 %     Platelets 296 150 - 450 x10*3/uL     Neutrophils % 79.1 40.0 - 80.0 %     Immature Granulocytes %, Automated 0.6 0.0 - 0.9 %     Lymphocytes % 12.2 13.0 - 44.0 %     Monocytes % 8.0 2.0 - 10.0 %     Eosinophils % 0.0 0.0 - 6.0 %     Basophils % 0.1 0.0 - 2.0 %     Neutrophils Absolute 16.25 (H) 1.60 - 5.50 x10*3/uL     Immature Granulocytes Absolute, Automated 0.12 0.00 - 0.50 x10*3/uL     Lymphocytes Absolute 2.51 0.80 - 3.00 x10*3/uL     Monocytes Absolute 1.65 (H) 0.05 - 0.80 x10*3/uL     Eosinophils Absolute 0.00 0.00 - 0.40 x10*3/uL     Basophils Absolute 0.03 0.00 - 0.10 x10*3/uL   Magnesium   Result Value Ref Range     Magnesium 2.01 1.60 - 2.40 mg/dL   Comprehensive metabolic panel   Result Value Ref Range     Glucose 145 (H) 74 - 99 mg/dL     Sodium 138 136 - 145 mmol/L     Potassium 4.1 3.5 - 5.3 mmol/L     Chloride 103 98 - 107 mmol/L     Bicarbonate 24 21 - 32 mmol/L     Anion Gap 15 10 - 20 mmol/L     Urea Nitrogen 32 (H) 6 - 23 mg/dL     Creatinine 1.24 (H) 0.50 - 1.05 mg/dL     eGFR 44 (L) >60 mL/min/1.73m*2     Calcium 9.4 8.6 - 10.3 mg/dL     Albumin 4.3 3.4 - 5.0 g/dL     Alkaline Phosphatase 83 33 - 136 U/L     Total Protein 7.7 6.4 - 8.2 g/dL     AST 19 9 - 39 U/L     Bilirubin, Total 0.8 0.0 - 1.2 mg/dL     ALT 17 7 - 45 U/L   Lipase   Result Value Ref Range     Lipase 11 9 - 82 U/L    Protime-INR   Result Value Ref Range     Protime 12.8 9.8 - 12.8 seconds     INR 1.1 0.9 - 1.1      .        Assessment/Plan        Assessment & Plan  UTI (urinary tract infection), bacterial  79-year-old female with past medical history of hypertension, hyperlipidemia, diabetes mellitus, atrial fibrillation and valvular heart disease (No Ac, aspirin only), Graves' disease, Sjogren's, osteoporosis, multiple spinal surgeries w/ most recent 3/2024 , appendectomy, JONATHAN, and anxiety.  Patient presents with abdominal pain and bloody stools.      #Suspected ischemic colitis versus possible mild diverticulitis     Telemetry monitoring  Continue with Zosyn empirically  If patient does develop diarrhea would collect stool samples  Consult gastroenterology, appreciate input.    Trend H&H, transfuse for hemoglobin less than 7 or patient becomes symptomatic.  Monitor for overt bleeding  Tylenol and tramadol as needed for pain  Clear liquid diet today n.p.o. after midnight  Hold aspirin  CBC and BMP in a.m.     #Renal insufficiency  IV fluids and repeat labs     Chronic issues:  #Hypertension  #Hyperlipidemia  #Non-insulin-dependent type 2 diabetes mellitus  #History of atrial fibrillation not on AC, currently sinus rhythm  #History of valvular heart disease  #Graves' disease  #Sjogren's  #Osteoporosis   #Several spinal surgeries     Continue with patient's home medications as appropriate  Sliding scale insulin while hospitalized  Hypoglycemia protocol     #DVT prophylaxis  SCDs  Hold chemoprophylaxis     Serge Eason APRN-CNP  Patient fully evaluated and plan as above              Revision History                   Tripp Dexter MD

## 2024-09-21 LAB
ALBUMIN SERPL BCP-MCNC: 3.1 G/DL (ref 3.4–5)
ALP SERPL-CCNC: 72 U/L (ref 33–136)
ALT SERPL W P-5'-P-CCNC: 13 U/L (ref 7–45)
ANION GAP SERPL CALC-SCNC: 14 MMOL/L (ref 10–20)
AST SERPL W P-5'-P-CCNC: 15 U/L (ref 9–39)
BILIRUB SERPL-MCNC: 0.8 MG/DL (ref 0–1.2)
BUN SERPL-MCNC: 14 MG/DL (ref 6–23)
CALCIUM SERPL-MCNC: 8.4 MG/DL (ref 8.6–10.3)
CHLORIDE SERPL-SCNC: 108 MMOL/L (ref 98–107)
CO2 SERPL-SCNC: 23 MMOL/L (ref 21–32)
CREAT SERPL-MCNC: 0.78 MG/DL (ref 0.5–1.05)
EGFRCR SERPLBLD CKD-EPI 2021: 77 ML/MIN/1.73M*2
ERYTHROCYTE [DISTWIDTH] IN BLOOD BY AUTOMATED COUNT: 13.3 % (ref 11.5–14.5)
GLUCOSE BLD MANUAL STRIP-MCNC: 111 MG/DL (ref 74–99)
GLUCOSE BLD MANUAL STRIP-MCNC: 123 MG/DL (ref 74–99)
GLUCOSE BLD MANUAL STRIP-MCNC: 129 MG/DL (ref 74–99)
GLUCOSE BLD MANUAL STRIP-MCNC: 166 MG/DL (ref 74–99)
GLUCOSE SERPL-MCNC: 118 MG/DL (ref 74–99)
HCT VFR BLD AUTO: 36.4 % (ref 36–46)
HGB BLD-MCNC: 11.7 G/DL (ref 12–16)
MCH RBC QN AUTO: 28.4 PG (ref 26–34)
MCHC RBC AUTO-ENTMCNC: 32.1 G/DL (ref 32–36)
MCV RBC AUTO: 88 FL (ref 80–100)
NRBC BLD-RTO: 0 /100 WBCS (ref 0–0)
PLATELET # BLD AUTO: 262 X10*3/UL (ref 150–450)
POTASSIUM SERPL-SCNC: 3.9 MMOL/L (ref 3.5–5.3)
PROT SERPL-MCNC: 5.7 G/DL (ref 6.4–8.2)
RBC # BLD AUTO: 4.12 X10*6/UL (ref 4–5.2)
SODIUM SERPL-SCNC: 141 MMOL/L (ref 136–145)
WBC # BLD AUTO: 14.9 X10*3/UL (ref 4.4–11.3)

## 2024-09-21 PROCEDURE — 85027 COMPLETE CBC AUTOMATED: CPT | Performed by: INTERNAL MEDICINE

## 2024-09-21 PROCEDURE — 99232 SBSQ HOSP IP/OBS MODERATE 35: CPT | Performed by: STUDENT IN AN ORGANIZED HEALTH CARE EDUCATION/TRAINING PROGRAM

## 2024-09-21 PROCEDURE — 2500000001 HC RX 250 WO HCPCS SELF ADMINISTERED DRUGS (ALT 637 FOR MEDICARE OP): Performed by: STUDENT IN AN ORGANIZED HEALTH CARE EDUCATION/TRAINING PROGRAM

## 2024-09-21 PROCEDURE — 2500000002 HC RX 250 W HCPCS SELF ADMINISTERED DRUGS (ALT 637 FOR MEDICARE OP, ALT 636 FOR OP/ED): Performed by: NURSE PRACTITIONER

## 2024-09-21 PROCEDURE — 2500000001 HC RX 250 WO HCPCS SELF ADMINISTERED DRUGS (ALT 637 FOR MEDICARE OP): Performed by: INTERNAL MEDICINE

## 2024-09-21 PROCEDURE — 2500000001 HC RX 250 WO HCPCS SELF ADMINISTERED DRUGS (ALT 637 FOR MEDICARE OP): Performed by: NURSE PRACTITIONER

## 2024-09-21 PROCEDURE — 2500000004 HC RX 250 GENERAL PHARMACY W/ HCPCS (ALT 636 FOR OP/ED)

## 2024-09-21 PROCEDURE — 82947 ASSAY GLUCOSE BLOOD QUANT: CPT

## 2024-09-21 PROCEDURE — 84075 ASSAY ALKALINE PHOSPHATASE: CPT | Performed by: INTERNAL MEDICINE

## 2024-09-21 PROCEDURE — 36415 COLL VENOUS BLD VENIPUNCTURE: CPT | Performed by: INTERNAL MEDICINE

## 2024-09-21 PROCEDURE — 2060000001 HC INTERMEDIATE ICU ROOM DAILY

## 2024-09-21 PROCEDURE — 2500000004 HC RX 250 GENERAL PHARMACY W/ HCPCS (ALT 636 FOR OP/ED): Performed by: NURSE PRACTITIONER

## 2024-09-21 RX ORDER — METOPROLOL TARTRATE 50 MG/1
50 TABLET ORAL 2 TIMES DAILY
Status: DISCONTINUED | OUTPATIENT
Start: 2024-09-21 | End: 2024-09-22

## 2024-09-21 ASSESSMENT — PAIN - FUNCTIONAL ASSESSMENT: PAIN_FUNCTIONAL_ASSESSMENT: 0-10

## 2024-09-21 ASSESSMENT — PAIN SCALES - GENERAL
PAINLEVEL_OUTOF10: 0 - NO PAIN
PAINLEVEL_OUTOF10: 0 - NO PAIN

## 2024-09-21 ASSESSMENT — COGNITIVE AND FUNCTIONAL STATUS - GENERAL
DAILY ACTIVITIY SCORE: 23
MOBILITY SCORE: 24
DRESSING REGULAR LOWER BODY CLOTHING: A LITTLE

## 2024-09-21 NOTE — PROGRESS NOTES
Nicki Paulson is a 79 y.o. female on day 2 of admission presenting with UTI (urinary tract infection), bacterial.      Subjective   Patient fully evaluated on September 20.  Continue to monitor H&H.  Heart rate still elevated.  Digoxin given earlier.  Recheck labs in AM.       Objective     Last Recorded Vitals  /63   Pulse 72   Temp 36 °C (96.8 °F)   Resp 16   Wt 62.5 kg (137 lb 11.2 oz)   SpO2 93%   Intake/Output last 3 Shifts:    Intake/Output Summary (Last 24 hours) at 9/21/2024 1631  Last data filed at 9/21/2024 0423  Gross per 24 hour   Intake 50 ml   Output --   Net 50 ml       Admission Weight  Weight: 61.2 kg (135 lb) (09/19/24 1019)    Daily Weight  09/19/24 : 62.5 kg (137 lb 11.2 oz)    Image Results  ECG 12 lead  Normal sinus rhythm  Left ventricular hypertrophy with repolarization abnormality ( Sokolow-Evans )  Abnormal ECG  No previous ECGs available      Physical Exam    Relevant Results               Assessment/Plan   This patient currently has cardiac telemetry ordered; if you would like to modify or discontinue the telemetry order, click here to go to the orders activity to modify/discontinue the order.              Assessment & Plan  UTI (urinary tract infection), bacterial  79-year-old female with past medical history of hypertension, hyperlipidemia, diabetes mellitus, atrial fibrillation and valvular heart disease (No Ac, aspirin only), Graves' disease, Sjogren's, osteoporosis, multiple spinal surgeries w/ most recent 3/2024 , appendectomy, JONATHAN, and anxiety.  Patient presents with abdominal pain and bloody stools.         Tripp Dexter MD  Physician  Internal Medicine     H&P      Addendum     Date of Service: 9/19/2024  5:06 PM     Addendum       Expand All Collapse All    History Of Present Illness  Nicki Paulson is a 79-year-old female with past medical history of hypertension, hyperlipidemia, diabetes mellitus, atrial fibrillation and valvular heart disease (No Ac, aspirin  only), Graves' disease, Sjogren's, osteoporosis, multiple spinal surgeries w/ most recent 3/2024 , appendectomy, JONATHAN, and anxiety.  Patient presents with abdominal pain and bloody stools.  Reports abdominal pain has been generalized for proximately 1 week.  She felt that it was likely diverticulitis and has decreased her oral intake and was attempting to manage until seeing Dr. Ralph on Tuesday.  Yesterday evening she developed bloody bowel movements as well, describes as bright red with some darker clots.  She has never had blood in her stools with prior episodes of diverticulitis.  She has had a total of 4 bloody movements. denies any diarrhea.  Has not been having very many bowel movements due to decreased oral intake.  Reports nausea without vomiting.  No prior history of GI bleed.  She does take 400 to 600 mg ibuprofen only several times weekly.  Denies any fevers, chills, sweats, recent illness, chest pains, palpitations, cough, wheezing, shortness of breath, edema, wounds, or urinary complaints.  She did take her blood pressure medications prior to coming in this morning.  States she had a colonoscopy several years ago with Dr. Kim, she does not recall history of significant polyps.  Colonoscopy was reviewed and was done in August 2022 at which time diverticular disease was noted as well as an area with purulent drainage concerning for diverticulitis, however patient had been asymptomatic prior to going for colonoscopy.     ER course: Afebrile, hypertensive with SBP 190s to 200s/70s to 80s.  WBC 20.6, CBC otherwise.  INR 1.1. lipase 11, glucose 145, BUN 32, creatinine 1.24 (baseline 0.8-0.9). UA negative nitrates, small leukocyte esterase.     CT angio A/P negative for active GI bleed.  Noted diffuse hyperemia, wall thickening, surrounding soft tissue stranding involving the descending colon from the level of the splenic flexure to the distal sigmoid colon detailed above.  Scattered diverticula without  evidence of microperforation or adjacent abscess formation. Urine culture in process. Patient received Zosyn and 1 L normal saline bolus.     History: As above  Past surgical history: Saphenous vein ablation, Knee replacement, lumbar Lumbar laminectomy, b/l foraminotomy 2-4; T spine T12 laminectomy, cervical fusion, tonsillectomy, total abdominal hysterectomy, appendectomy, and bladder suspension surgery.  Social history: Non-smoker, no illicit drug use, alcohol abuse.  Family history: Noncontributory     Past Medical History  Medical History        Past Medical History:   Diagnosis Date    Other chest pain       Chest pressure    Personal history of other endocrine, nutritional and metabolic disease       History of type 2 diabetes mellitus    Personal history of other specified conditions       History of palpitations    Varicose veins of unspecified lower extremity with other complications       Varicose veins of leg with complications            Surgical History  Surgical History         Past Surgical History:   Procedure Laterality Date    BLADDER SURGERY   07/05/2018     Bladder Surgery            Social History  She reports that she has never smoked. She has never used smokeless tobacco. She reports that she does not drink alcohol and does not use drugs.     Family History  Family History   No family history on file.        Allergies  Atorvastatin, Ciprofloxacin, and Bactrim [sulfamethoxazole-trimethoprim]     Review of Systems     10 point review of systems negative except as noted above in HPI     Physical Exam  Constitutional:       General: She is awake. She is not in acute distress.     Appearance: Normal appearance. She is not toxic-appearing.   HENT:      Head: Atraumatic.      Nose: Nose normal.      Mouth/Throat:      Mouth: Mucous membranes are moist.   Eyes:      Conjunctiva/sclera: Conjunctivae normal.   Cardiovascular:      Rate and Rhythm: Normal rate and regular rhythm.      Pulses: Normal  "pulses.      Heart sounds: No murmur heard.  Pulmonary:      Effort: Pulmonary effort is normal. No respiratory distress.      Breath sounds: Normal breath sounds.   Abdominal:      General: Bowel sounds are normal. There is no distension.      Palpations: Abdomen is soft.      Tenderness: There is abdominal tenderness. There is no guarding.   Musculoskeletal:         General: No swelling, deformity or signs of injury. Normal range of motion.      Cervical back: Neck supple.      Right lower leg: No edema.      Left lower leg: No edema.   Skin:     General: Skin is warm and dry.      Capillary Refill: Capillary refill takes less than 2 seconds.      Findings: No ecchymosis, erythema or wound.   Neurological:      General: No focal deficit present.      Mental Status: She is alert and oriented to person, place, and time.   Psychiatric:         Mood and Affect: Mood normal.         Behavior: Behavior is cooperative.            Last Recorded Vitals  Blood pressure (!) 198/81, pulse 83, temperature 36.3 °C (97.3 °F), temperature source Temporal, resp. rate (!) 25, height 1.575 m (5' 2\"), weight 61.2 kg (135 lb), SpO2 94%.     Relevant Results              Results for orders placed or performed during the hospital encounter of 09/19/24 (from the past 24 hour(s))   Urinalysis with Reflex Culture and Microscopic   Result Value Ref Range     Color, Urine Light-Yellow Light-Yellow, Yellow, Dark-Yellow     Appearance, Urine Clear Clear     Specific Gravity, Urine 1.012 1.005 - 1.035     pH, Urine 5.0 5.0, 5.5, 6.0, 6.5, 7.0, 7.5, 8.0     Protein, Urine NEGATIVE NEGATIVE, 10 (TRACE), 20 (TRACE) mg/dL     Glucose, Urine Normal Normal mg/dL     Blood, Urine 0.03 (TRACE) (A) NEGATIVE     Ketones, Urine NEGATIVE NEGATIVE mg/dL     Bilirubin, Urine NEGATIVE NEGATIVE     Urobilinogen, Urine Normal Normal mg/dL     Nitrite, Urine NEGATIVE NEGATIVE     Leukocyte Esterase, Urine 25 Bren/µL (A) NEGATIVE   Microscopic Only, Urine   Result " Value Ref Range     WBC, Urine 11-20 (A) 1-5, NONE /HPF     WBC Clumps, Urine OCCASIONAL Reference range not established. /HPF     RBC, Urine 1-2 NONE, 1-2, 3-5 /HPF     Bacteria, Urine 1+ (A) NONE SEEN /HPF     Mucus, Urine FEW Reference range not established. /LPF   CBC and Auto Differential   Result Value Ref Range     WBC 20.6 (H) 4.4 - 11.3 x10*3/uL     nRBC 0.0 0.0 - 0.0 /100 WBCs     RBC 4.85 4.00 - 5.20 x10*6/uL     Hemoglobin 14.2 12.0 - 16.0 g/dL     Hematocrit 42.8 36.0 - 46.0 %     MCV 88 80 - 100 fL     MCH 29.3 26.0 - 34.0 pg     MCHC 33.2 32.0 - 36.0 g/dL     RDW 13.2 11.5 - 14.5 %     Platelets 296 150 - 450 x10*3/uL     Neutrophils % 79.1 40.0 - 80.0 %     Immature Granulocytes %, Automated 0.6 0.0 - 0.9 %     Lymphocytes % 12.2 13.0 - 44.0 %     Monocytes % 8.0 2.0 - 10.0 %     Eosinophils % 0.0 0.0 - 6.0 %     Basophils % 0.1 0.0 - 2.0 %     Neutrophils Absolute 16.25 (H) 1.60 - 5.50 x10*3/uL     Immature Granulocytes Absolute, Automated 0.12 0.00 - 0.50 x10*3/uL     Lymphocytes Absolute 2.51 0.80 - 3.00 x10*3/uL     Monocytes Absolute 1.65 (H) 0.05 - 0.80 x10*3/uL     Eosinophils Absolute 0.00 0.00 - 0.40 x10*3/uL     Basophils Absolute 0.03 0.00 - 0.10 x10*3/uL   Magnesium   Result Value Ref Range     Magnesium 2.01 1.60 - 2.40 mg/dL   Comprehensive metabolic panel   Result Value Ref Range     Glucose 145 (H) 74 - 99 mg/dL     Sodium 138 136 - 145 mmol/L     Potassium 4.1 3.5 - 5.3 mmol/L     Chloride 103 98 - 107 mmol/L     Bicarbonate 24 21 - 32 mmol/L     Anion Gap 15 10 - 20 mmol/L     Urea Nitrogen 32 (H) 6 - 23 mg/dL     Creatinine 1.24 (H) 0.50 - 1.05 mg/dL     eGFR 44 (L) >60 mL/min/1.73m*2     Calcium 9.4 8.6 - 10.3 mg/dL     Albumin 4.3 3.4 - 5.0 g/dL     Alkaline Phosphatase 83 33 - 136 U/L     Total Protein 7.7 6.4 - 8.2 g/dL     AST 19 9 - 39 U/L     Bilirubin, Total 0.8 0.0 - 1.2 mg/dL     ALT 17 7 - 45 U/L   Lipase   Result Value Ref Range     Lipase 11 9 - 82 U/L   Protime-INR    Result Value Ref Range     Protime 12.8 9.8 - 12.8 seconds     INR 1.1 0.9 - 1.1      .        Assessment/Plan        Assessment & Plan  UTI (urinary tract infection), bacterial  79-year-old female with past medical history of hypertension, hyperlipidemia, diabetes mellitus, atrial fibrillation and valvular heart disease (No Ac, aspirin only), Graves' disease, Sjogren's, osteoporosis, multiple spinal surgeries w/ most recent 3/2024 , appendectomy, JONATHAN, and anxiety.  Patient presents with abdominal pain and bloody stools.      #Suspected ischemic colitis versus possible mild diverticulitis     Telemetry monitoring  Continue with Zosyn empirically  If patient does develop diarrhea would collect stool samples  Consult gastroenterology, appreciate input.    Trend H&H, transfuse for hemoglobin less than 7 or patient becomes symptomatic.  Monitor for overt bleeding  Tylenol and tramadol as needed for pain  Clear liquid diet today n.p.o. after midnight  Hold aspirin  CBC and BMP in a.m.     #Renal insufficiency  IV fluids and repeat labs     Chronic issues:  #Hypertension  #Hyperlipidemia  #Non-insulin-dependent type 2 diabetes mellitus  #History of atrial fibrillation not on AC, currently sinus rhythm  #History of valvular heart disease  #Graves' disease  #Sjogren's  #Osteoporosis   #Several spinal surgeries     Continue with patient's home medications as appropriate  Sliding scale insulin while hospitalized  Hypoglycemia protocol     #DVT prophylaxis  SCDs  Hold chemoprophylaxis    Patient fully evaluated and plan as above              Revision History     Patient fully evaluated on September 20.  Continue to monitor H&H.  Heart rate still elevated.  Digoxin given earlier.  Recheck labs in AM.    Patient fully evaluated 9/21/24, awake, alert, and appropriate, resting in bed. Patient seen by cardiology with plan -  Clinical course:  - 9/21/2024: Currently in sinus rhythm; agree with increasing metoprolol XL 50 mg twice  daily; okay to stop diltiazem GGT; systemic anticoagulation be deferred in setting of recent GI bleeding     Overall Recommendations:  1.  Paroxysmal atrial fibrillation  - Likely exacerbated by abdominal pain, colitis  - Will defer systemic anticoagulation at this time in light of recent melena; given elevated [id number] can reconsider systemic anticoagulation with DOAC once acceptable risk from a GI bleeding standpoint  - Status post rate control diltiazem GGT  - Status post loading with with IV digoxin as above  - Continue metoprolol XL to 50 mg twice daily     2.  Colitis  - Possibly ischemic  - ?  Cardioembolic in setting of atrial fibrillation  - Further workup with endoscopy as per GI     3.  Hypertension  - Continue lisinopril, metoprolol  - Hold antihypertensive therapy for MAP less than 60     Patient denies chest pain, SOB, and/or acute difficulties at this time. Patient medications adjusted - deescalated IV diltiazem GGT, increased metoprolol XL 50 mg twice daily; medications and labs reviewed, continue current and repeat labs in the AM. Diet advanced to regular diet. Urine culture no growth - final result. Patient aware and agreeable to current plan, continue plan as above. I spent 50 minutes in the professional and overall care of this patient.            Lorrie Chong

## 2024-09-21 NOTE — CARE PLAN
The patient's goals for the shift include rest    The clinical goals for the shift include patient's heart rate will remain stable and she will refrain from further complications    Over the shift, the patient will have stable vitals and refrain from further complications

## 2024-09-21 NOTE — PROGRESS NOTES
"Cardiology Consult Progress Note:    Interval History:  - Interval improvement in heart rate control after loading with IV digoxin; metoprolol XL increased to 50 mg twice daily; currently well rate controlled  - Systemic anticoagulation deferred in setting of recent GI bleeding    Objective:     Inpatient Medications:  Scheduled medications   Medication Dose Route Frequency    [Held by provider] aspirin  81 mg oral Nightly    cholecalciferol  2,000 Units oral Nightly    hydroxychloroquine  200 mg oral q AM    insulin lispro  0-10 Units subcutaneous q6h    levothyroxine  50 mcg oral Once per day on Monday Tuesday Thursday Friday    lisinopril  20 mg oral Daily    metoprolol succinate XL  50 mg oral BID    metoprolol tartrate  50 mg oral BID    pantoprazole  40 mg oral Daily before breakfast    piperacillin-tazobactam  3.375 g intravenous q6h       PRN medications   Medication    acetaminophen    Or    acetaminophen    Or    acetaminophen    cetirizine    dextrose    dextrose    fluticasone    glucagon    glucagon    hydrALAZINE    ondansetron    traMADol       Continuous Medications   Medication Dose Last Rate    dilTIAZem  5-15 mg/hr 15 mg/hr (09/21/24 0253)       Intake / Output Summary:     Intake/Output Summary (Last 24 hours) at 9/21/2024 1520  Last data filed at 9/21/2024 0423  Gross per 24 hour   Intake 50 ml   Output --   Net 50 ml       Net IO Since Admission: 1,844.83 mL [09/21/24 1520]    Physical Exam:  /65 (BP Location: Right arm, Patient Position: Lying)   Pulse 81   Temp 35.9 °C (96.6 °F) (Temporal)   Resp 16   Ht 1.575 m (5' 2\")   Wt 62.5 kg (137 lb 11.2 oz)   SpO2 92%   BMI 25.19 kg/m²     Physical Exam  Constitutional:       General: She is not in acute distress.  HENT:      Head: Normocephalic.      Mouth/Throat:      Mouth: Mucous membranes are moist.   Eyes:      Extraocular Movements: Extraocular movements intact.      Conjunctiva/sclera: Conjunctivae normal.   Neck:      Vascular: " No carotid bruit or JVD.   Cardiovascular:      Rate and Rhythm: Normal rate.      Pulses: Normal pulses.      Heart sounds: No murmur heard.  Pulmonary:      Effort: Pulmonary effort is normal. No respiratory distress.      Breath sounds: Normal breath sounds.   Abdominal:      General: Bowel sounds are normal. There is no distension.      Palpations: Abdomen is soft.   Musculoskeletal:         General: No swelling.   Skin:     General: Skin is warm and dry.   Neurological:      General: No focal deficit present.      Mental Status: She is alert.      Cranial Nerves: No cranial nerve deficit.      Motor: No weakness.   Psychiatric:         Mood and Affect: Mood normal.         Behavior: Behavior normal.         Lab/Radiology/Diagnostic Review:    Labs  Results for orders placed or performed during the hospital encounter of 09/19/24 (from the past 24 hour(s))   POCT GLUCOSE   Result Value Ref Range    POCT Glucose 114 (H) 74 - 99 mg/dL   POCT GLUCOSE   Result Value Ref Range    POCT Glucose 111 (H) 74 - 99 mg/dL   POCT GLUCOSE   Result Value Ref Range    POCT Glucose 123 (H) 74 - 99 mg/dL   CBC   Result Value Ref Range    WBC 14.9 (H) 4.4 - 11.3 x10*3/uL    nRBC 0.0 0.0 - 0.0 /100 WBCs    RBC 4.12 4.00 - 5.20 x10*6/uL    Hemoglobin 11.7 (L) 12.0 - 16.0 g/dL    Hematocrit 36.4 36.0 - 46.0 %    MCV 88 80 - 100 fL    MCH 28.4 26.0 - 34.0 pg    MCHC 32.1 32.0 - 36.0 g/dL    RDW 13.3 11.5 - 14.5 %    Platelets 262 150 - 450 x10*3/uL   Comprehensive Metabolic Panel   Result Value Ref Range    Glucose 118 (H) 74 - 99 mg/dL    Sodium 141 136 - 145 mmol/L    Potassium 3.9 3.5 - 5.3 mmol/L    Chloride 108 (H) 98 - 107 mmol/L    Bicarbonate 23 21 - 32 mmol/L    Anion Gap 14 10 - 20 mmol/L    Urea Nitrogen 14 6 - 23 mg/dL    Creatinine 0.78 0.50 - 1.05 mg/dL    eGFR 77 >60 mL/min/1.73m*2    Calcium 8.4 (L) 8.6 - 10.3 mg/dL    Albumin 3.1 (L) 3.4 - 5.0 g/dL    Alkaline Phosphatase 72 33 - 136 U/L    Total Protein 5.7 (L) 6.4 -  8.2 g/dL    AST 15 9 - 39 U/L    Bilirubin, Total 0.8 0.0 - 1.2 mg/dL    ALT 13 7 - 45 U/L   POCT GLUCOSE   Result Value Ref Range    POCT Glucose 129 (H) 74 - 99 mg/dL       Peripheral IV 09/19/24 20 G Left Antecubital (Active)   Placement Date/Time: 09/19/24 1101   Size (Gauge): 20 G  Orientation: Left  Location: Antecubital   Number of days: 2       Peripheral IV 09/20/24 22 G Right;Posterior Forearm (Active)   Placement Date/Time: 09/20/24 0422   Size (Gauge): 22 G  Orientation: Right;Posterior  Location: Forearm  Local Anesthetic: None  Technique: Anatomical landmarks  Placed by: chinese  Insertion attempts: 1  Patient Tolerance: Fair   Number of days: 1        Hemoglobin A1C   Date/Time Value Ref Range Status   06/04/2024 11:33 AM 6.2 (H) 4.0 - 5.6 % Final   08/14/2023 11:02 AM 6.1 (H) 4.0 - 5.6 % Final       Assessment:   79 y.o.  female who presented with abdominal pain, bloody stools. Past medical history of paroxysmal atrial fibrillation (not on anticoagulation), hypertension, dyslipidemia, diabetes mellitus, Graves' disease, Sjogren's, osteoporosis, multiple spinal surgery, appendectomy, total abdominal hysterectomy, diverticulosis/history of diverticulitis, and anxiety.     Patient with abdominal pain with evidence of bowel thickening on CT imaging suggestive of ischemic colitis.  Further evaluation with endoscopy as per GI.  Given melena/bloody bowel movements we will defer systemic anticoagulation at this time.  Given elevated [id number] would recommend systemic anticoagulation once acceptable risk from a GI bleeding standpoint.     We will continue rate control with diltiazem GGT; patient to be loaded with IV digoxin today, would then defer any additional digoxin in light of hydroxychloroquine.  Avoid hypokalemia and hypomagnesemia.  Will uptitrate beta-blockade as tolerated; increase metoprolol XL to 50 mg twice daily.  Atrial fibrillation exacerbated by underlying abdominal pain, colitis.   Treat underlying exacerbating factors.    Clinical course:  - 9/21/2024: Currently in sinus rhythm; agree with increasing metoprolol XL 50 mg twice daily; okay to stop diltiazem GGT; systemic anticoagulation be deferred in setting of recent GI bleeding     Overall Recommendations:  1.  Paroxysmal atrial fibrillation  - Likely exacerbated by abdominal pain, colitis  - Will defer systemic anticoagulation at this time in light of recent melena; given elevated [id number] can reconsider systemic anticoagulation with DOAC once acceptable risk from a GI bleeding standpoint  - Status post rate control diltiazem GGT  - Status post loading with with IV digoxin as above  - Continue metoprolol XL to 50 mg twice daily     2.  Colitis  - Possibly ischemic  - ?  Cardioembolic in setting of atrial fibrillation  - Further workup with endoscopy as per GI     3.  Hypertension  - Continue lisinopril, metoprolol  - Hold antihypertensive therapy for MAP less than 60     Thank you for the cardiology consult. We will follow with you.      Jordan Kamara MD

## 2024-09-22 ENCOUNTER — APPOINTMENT (OUTPATIENT)
Dept: RADIOLOGY | Facility: HOSPITAL | Age: 79
DRG: 394 | End: 2024-09-22
Payer: MEDICARE

## 2024-09-22 VITALS
OXYGEN SATURATION: 95 % | DIASTOLIC BLOOD PRESSURE: 71 MMHG | TEMPERATURE: 97.3 F | WEIGHT: 137.7 LBS | HEART RATE: 84 BPM | SYSTOLIC BLOOD PRESSURE: 169 MMHG | BODY MASS INDEX: 25.34 KG/M2 | RESPIRATION RATE: 16 BRPM | HEIGHT: 62 IN

## 2024-09-22 LAB
ALBUMIN SERPL BCP-MCNC: 3 G/DL (ref 3.4–5)
ALP SERPL-CCNC: 66 U/L (ref 33–136)
ALT SERPL W P-5'-P-CCNC: 14 U/L (ref 7–45)
ANION GAP SERPL CALC-SCNC: 16 MMOL/L (ref 10–20)
AST SERPL W P-5'-P-CCNC: 17 U/L (ref 9–39)
BASOPHILS # BLD AUTO: 0.05 X10*3/UL (ref 0–0.1)
BASOPHILS NFR BLD AUTO: 0.4 %
BILIRUB SERPL-MCNC: 0.6 MG/DL (ref 0–1.2)
BUN SERPL-MCNC: 13 MG/DL (ref 6–23)
CALCIUM SERPL-MCNC: 7.9 MG/DL (ref 8.6–10.3)
CHLORIDE SERPL-SCNC: 108 MMOL/L (ref 98–107)
CO2 SERPL-SCNC: 21 MMOL/L (ref 21–32)
CREAT SERPL-MCNC: 0.81 MG/DL (ref 0.5–1.05)
EGFRCR SERPLBLD CKD-EPI 2021: 74 ML/MIN/1.73M*2
EOSINOPHIL # BLD AUTO: 0.12 X10*3/UL (ref 0–0.4)
EOSINOPHIL NFR BLD AUTO: 0.9 %
ERYTHROCYTE [DISTWIDTH] IN BLOOD BY AUTOMATED COUNT: 13.2 % (ref 11.5–14.5)
GLUCOSE BLD MANUAL STRIP-MCNC: 102 MG/DL (ref 74–99)
GLUCOSE BLD MANUAL STRIP-MCNC: 117 MG/DL (ref 74–99)
GLUCOSE BLD MANUAL STRIP-MCNC: 126 MG/DL (ref 74–99)
GLUCOSE BLD MANUAL STRIP-MCNC: 127 MG/DL (ref 74–99)
GLUCOSE SERPL-MCNC: 113 MG/DL (ref 74–99)
HCT VFR BLD AUTO: 37 % (ref 36–46)
HGB BLD-MCNC: 11.6 G/DL (ref 12–16)
IMM GRANULOCYTES # BLD AUTO: 0.15 X10*3/UL (ref 0–0.5)
IMM GRANULOCYTES NFR BLD AUTO: 1.2 % (ref 0–0.9)
LYMPHOCYTES # BLD AUTO: 3.42 X10*3/UL (ref 0.8–3)
LYMPHOCYTES NFR BLD AUTO: 26.3 %
MCH RBC QN AUTO: 28.2 PG (ref 26–34)
MCHC RBC AUTO-ENTMCNC: 31.4 G/DL (ref 32–36)
MCV RBC AUTO: 90 FL (ref 80–100)
MONOCYTES # BLD AUTO: 1.22 X10*3/UL (ref 0.05–0.8)
MONOCYTES NFR BLD AUTO: 9.4 %
NEUTROPHILS # BLD AUTO: 8.06 X10*3/UL (ref 1.6–5.5)
NEUTROPHILS NFR BLD AUTO: 61.8 %
NRBC BLD-RTO: 0 /100 WBCS (ref 0–0)
PLATELET # BLD AUTO: 250 X10*3/UL (ref 150–450)
POTASSIUM SERPL-SCNC: 3.9 MMOL/L (ref 3.5–5.3)
PROT SERPL-MCNC: 5.2 G/DL (ref 6.4–8.2)
RBC # BLD AUTO: 4.11 X10*6/UL (ref 4–5.2)
SODIUM SERPL-SCNC: 141 MMOL/L (ref 136–145)
WBC # BLD AUTO: 13 X10*3/UL (ref 4.4–11.3)

## 2024-09-22 PROCEDURE — 2500000004 HC RX 250 GENERAL PHARMACY W/ HCPCS (ALT 636 FOR OP/ED): Performed by: NURSE PRACTITIONER

## 2024-09-22 PROCEDURE — 71045 X-RAY EXAM CHEST 1 VIEW: CPT | Performed by: STUDENT IN AN ORGANIZED HEALTH CARE EDUCATION/TRAINING PROGRAM

## 2024-09-22 PROCEDURE — 82947 ASSAY GLUCOSE BLOOD QUANT: CPT

## 2024-09-22 PROCEDURE — 84075 ASSAY ALKALINE PHOSPHATASE: CPT | Performed by: INTERNAL MEDICINE

## 2024-09-22 PROCEDURE — 2500000001 HC RX 250 WO HCPCS SELF ADMINISTERED DRUGS (ALT 637 FOR MEDICARE OP): Performed by: NURSE PRACTITIONER

## 2024-09-22 PROCEDURE — 36415 COLL VENOUS BLD VENIPUNCTURE: CPT | Performed by: INTERNAL MEDICINE

## 2024-09-22 PROCEDURE — 85025 COMPLETE CBC W/AUTO DIFF WBC: CPT | Performed by: INTERNAL MEDICINE

## 2024-09-22 PROCEDURE — 71045 X-RAY EXAM CHEST 1 VIEW: CPT

## 2024-09-22 PROCEDURE — 2060000001 HC INTERMEDIATE ICU ROOM DAILY

## 2024-09-22 PROCEDURE — 2500000001 HC RX 250 WO HCPCS SELF ADMINISTERED DRUGS (ALT 637 FOR MEDICARE OP): Performed by: INTERNAL MEDICINE

## 2024-09-22 RX ORDER — METOPROLOL TARTRATE 100 MG/1
100 TABLET ORAL 2 TIMES DAILY
Status: DISCONTINUED | OUTPATIENT
Start: 2024-09-22 | End: 2024-09-23 | Stop reason: HOSPADM

## 2024-09-22 RX ORDER — AMOXICILLIN AND CLAVULANATE POTASSIUM 875; 125 MG/1; MG/1
1 TABLET, FILM COATED ORAL EVERY 12 HOURS SCHEDULED
Status: DISCONTINUED | OUTPATIENT
Start: 2024-09-22 | End: 2024-09-23 | Stop reason: HOSPADM

## 2024-09-22 ASSESSMENT — COGNITIVE AND FUNCTIONAL STATUS - GENERAL
MOBILITY SCORE: 24
DRESSING REGULAR LOWER BODY CLOTHING: A LITTLE
DAILY ACTIVITIY SCORE: 23
DAILY ACTIVITIY SCORE: 23
DRESSING REGULAR LOWER BODY CLOTHING: A LITTLE
MOBILITY SCORE: 23
CLIMB 3 TO 5 STEPS WITH RAILING: A LITTLE

## 2024-09-22 ASSESSMENT — PAIN - FUNCTIONAL ASSESSMENT: PAIN_FUNCTIONAL_ASSESSMENT: 0-10

## 2024-09-22 ASSESSMENT — PAIN SCALES - GENERAL
PAINLEVEL_OUTOF10: 0 - NO PAIN
PAINLEVEL_OUTOF10: 0 - NO PAIN

## 2024-09-22 NOTE — PROGRESS NOTES
Nicki Paulson is a 79 y.o. female on day 3 of admission presenting with UTI (urinary tract infection), bacterial.      Subjective   Patient fully evaluated on September 20.  Continue to monitor H&H.  Heart rate still elevated.  Digoxin given earlier.  Recheck labs in AM.       Objective     Last Recorded Vitals  /58 (BP Location: Right arm)   Pulse 63   Temp 36 °C (96.8 °F) (Temporal)   Resp 18   Wt 62.5 kg (137 lb 11.2 oz)   SpO2 96%   Intake/Output last 3 Shifts:    Intake/Output Summary (Last 24 hours) at 9/22/2024 1444  Last data filed at 9/22/2024 0424  Gross per 24 hour   Intake 100 ml   Output --   Net 100 ml       Admission Weight  Weight: 61.2 kg (135 lb) (09/19/24 1019)    Daily Weight  09/19/24 : 62.5 kg (137 lb 11.2 oz)    Image Results  ECG 12 lead  Normal sinus rhythm  Left ventricular hypertrophy with repolarization abnormality ( Sokolow-Evans )  Abnormal ECG  No previous ECGs available      Physical Exam    Relevant Results               Assessment/Plan   This patient currently has cardiac telemetry ordered; if you would like to modify or discontinue the telemetry order, click here to go to the orders activity to modify/discontinue the order.              Assessment & Plan  UTI (urinary tract infection), bacterial  79-year-old female with past medical history of hypertension, hyperlipidemia, diabetes mellitus, atrial fibrillation and valvular heart disease (No Ac, aspirin only), Graves' disease, Sjogren's, osteoporosis, multiple spinal surgeries w/ most recent 3/2024 , appendectomy, JONATHAN, and anxiety.  Patient presents with abdominal pain and bloody stools.         Tripp Dexter MD  Physician  Internal Medicine     H&P      Addendum     Date of Service: 9/19/2024  5:06 PM     Addendum       Expand All Collapse All    History Of Present Illness  Nicki Paulson is a 79-year-old female with past medical history of hypertension, hyperlipidemia, diabetes mellitus, atrial fibrillation and  valvular heart disease (No Ac, aspirin only), Graves' disease, Sjogren's, osteoporosis, multiple spinal surgeries w/ most recent 3/2024 , appendectomy, JONATHAN, and anxiety.  Patient presents with abdominal pain and bloody stools.  Reports abdominal pain has been generalized for proximately 1 week.  She felt that it was likely diverticulitis and has decreased her oral intake and was attempting to manage until seeing Dr. Ralph on Tuesday.  Yesterday evening she developed bloody bowel movements as well, describes as bright red with some darker clots.  She has never had blood in her stools with prior episodes of diverticulitis.  She has had a total of 4 bloody movements. denies any diarrhea.  Has not been having very many bowel movements due to decreased oral intake.  Reports nausea without vomiting.  No prior history of GI bleed.  She does take 400 to 600 mg ibuprofen only several times weekly.  Denies any fevers, chills, sweats, recent illness, chest pains, palpitations, cough, wheezing, shortness of breath, edema, wounds, or urinary complaints.  She did take her blood pressure medications prior to coming in this morning.  States she had a colonoscopy several years ago with Dr. Kim, she does not recall history of significant polyps.  Colonoscopy was reviewed and was done in August 2022 at which time diverticular disease was noted as well as an area with purulent drainage concerning for diverticulitis, however patient had been asymptomatic prior to going for colonoscopy.     ER course: Afebrile, hypertensive with SBP 190s to 200s/70s to 80s.  WBC 20.6, CBC otherwise.  INR 1.1. lipase 11, glucose 145, BUN 32, creatinine 1.24 (baseline 0.8-0.9). UA negative nitrates, small leukocyte esterase.     CT angio A/P negative for active GI bleed.  Noted diffuse hyperemia, wall thickening, surrounding soft tissue stranding involving the descending colon from the level of the splenic flexure to the distal sigmoid colon detailed  above.  Scattered diverticula without evidence of microperforation or adjacent abscess formation. Urine culture in process. Patient received Zosyn and 1 L normal saline bolus.     History: As above  Past surgical history: Saphenous vein ablation, Knee replacement, lumbar Lumbar laminectomy, b/l foraminotomy 2-4; T spine T12 laminectomy, cervical fusion, tonsillectomy, total abdominal hysterectomy, appendectomy, and bladder suspension surgery.  Social history: Non-smoker, no illicit drug use, alcohol abuse.  Family history: Noncontributory     Past Medical History  Medical History        Past Medical History:   Diagnosis Date   • Other chest pain       Chest pressure   • Personal history of other endocrine, nutritional and metabolic disease       History of type 2 diabetes mellitus   • Personal history of other specified conditions       History of palpitations   • Varicose veins of unspecified lower extremity with other complications       Varicose veins of leg with complications            Surgical History  Surgical History         Past Surgical History:   Procedure Laterality Date   • BLADDER SURGERY   07/05/2018     Bladder Surgery            Social History  She reports that she has never smoked. She has never used smokeless tobacco. She reports that she does not drink alcohol and does not use drugs.     Family History  Family History   No family history on file.        Allergies  Atorvastatin, Ciprofloxacin, and Bactrim [sulfamethoxazole-trimethoprim]     Review of Systems     10 point review of systems negative except as noted above in HPI     Physical Exam  Constitutional:       General: She is awake. She is not in acute distress.     Appearance: Normal appearance. She is not toxic-appearing.   HENT:      Head: Atraumatic.      Nose: Nose normal.      Mouth/Throat:      Mouth: Mucous membranes are moist.   Eyes:      Conjunctiva/sclera: Conjunctivae normal.   Cardiovascular:      Rate and Rhythm: Normal rate  "and regular rhythm.      Pulses: Normal pulses.      Heart sounds: No murmur heard.  Pulmonary:      Effort: Pulmonary effort is normal. No respiratory distress.      Breath sounds: Normal breath sounds.   Abdominal:      General: Bowel sounds are normal. There is no distension.      Palpations: Abdomen is soft.      Tenderness: There is abdominal tenderness. There is no guarding.   Musculoskeletal:         General: No swelling, deformity or signs of injury. Normal range of motion.      Cervical back: Neck supple.      Right lower leg: No edema.      Left lower leg: No edema.   Skin:     General: Skin is warm and dry.      Capillary Refill: Capillary refill takes less than 2 seconds.      Findings: No ecchymosis, erythema or wound.   Neurological:      General: No focal deficit present.      Mental Status: She is alert and oriented to person, place, and time.   Psychiatric:         Mood and Affect: Mood normal.         Behavior: Behavior is cooperative.            Last Recorded Vitals  Blood pressure (!) 198/81, pulse 83, temperature 36.3 °C (97.3 °F), temperature source Temporal, resp. rate (!) 25, height 1.575 m (5' 2\"), weight 61.2 kg (135 lb), SpO2 94%.     Relevant Results              Results for orders placed or performed during the hospital encounter of 09/19/24 (from the past 24 hour(s))   Urinalysis with Reflex Culture and Microscopic   Result Value Ref Range     Color, Urine Light-Yellow Light-Yellow, Yellow, Dark-Yellow     Appearance, Urine Clear Clear     Specific Gravity, Urine 1.012 1.005 - 1.035     pH, Urine 5.0 5.0, 5.5, 6.0, 6.5, 7.0, 7.5, 8.0     Protein, Urine NEGATIVE NEGATIVE, 10 (TRACE), 20 (TRACE) mg/dL     Glucose, Urine Normal Normal mg/dL     Blood, Urine 0.03 (TRACE) (A) NEGATIVE     Ketones, Urine NEGATIVE NEGATIVE mg/dL     Bilirubin, Urine NEGATIVE NEGATIVE     Urobilinogen, Urine Normal Normal mg/dL     Nitrite, Urine NEGATIVE NEGATIVE     Leukocyte Esterase, Urine 25 Bren/µL (A) " NEGATIVE   Microscopic Only, Urine   Result Value Ref Range     WBC, Urine 11-20 (A) 1-5, NONE /HPF     WBC Clumps, Urine OCCASIONAL Reference range not established. /HPF     RBC, Urine 1-2 NONE, 1-2, 3-5 /HPF     Bacteria, Urine 1+ (A) NONE SEEN /HPF     Mucus, Urine FEW Reference range not established. /LPF   CBC and Auto Differential   Result Value Ref Range     WBC 20.6 (H) 4.4 - 11.3 x10*3/uL     nRBC 0.0 0.0 - 0.0 /100 WBCs     RBC 4.85 4.00 - 5.20 x10*6/uL     Hemoglobin 14.2 12.0 - 16.0 g/dL     Hematocrit 42.8 36.0 - 46.0 %     MCV 88 80 - 100 fL     MCH 29.3 26.0 - 34.0 pg     MCHC 33.2 32.0 - 36.0 g/dL     RDW 13.2 11.5 - 14.5 %     Platelets 296 150 - 450 x10*3/uL     Neutrophils % 79.1 40.0 - 80.0 %     Immature Granulocytes %, Automated 0.6 0.0 - 0.9 %     Lymphocytes % 12.2 13.0 - 44.0 %     Monocytes % 8.0 2.0 - 10.0 %     Eosinophils % 0.0 0.0 - 6.0 %     Basophils % 0.1 0.0 - 2.0 %     Neutrophils Absolute 16.25 (H) 1.60 - 5.50 x10*3/uL     Immature Granulocytes Absolute, Automated 0.12 0.00 - 0.50 x10*3/uL     Lymphocytes Absolute 2.51 0.80 - 3.00 x10*3/uL     Monocytes Absolute 1.65 (H) 0.05 - 0.80 x10*3/uL     Eosinophils Absolute 0.00 0.00 - 0.40 x10*3/uL     Basophils Absolute 0.03 0.00 - 0.10 x10*3/uL   Magnesium   Result Value Ref Range     Magnesium 2.01 1.60 - 2.40 mg/dL   Comprehensive metabolic panel   Result Value Ref Range     Glucose 145 (H) 74 - 99 mg/dL     Sodium 138 136 - 145 mmol/L     Potassium 4.1 3.5 - 5.3 mmol/L     Chloride 103 98 - 107 mmol/L     Bicarbonate 24 21 - 32 mmol/L     Anion Gap 15 10 - 20 mmol/L     Urea Nitrogen 32 (H) 6 - 23 mg/dL     Creatinine 1.24 (H) 0.50 - 1.05 mg/dL     eGFR 44 (L) >60 mL/min/1.73m*2     Calcium 9.4 8.6 - 10.3 mg/dL     Albumin 4.3 3.4 - 5.0 g/dL     Alkaline Phosphatase 83 33 - 136 U/L     Total Protein 7.7 6.4 - 8.2 g/dL     AST 19 9 - 39 U/L     Bilirubin, Total 0.8 0.0 - 1.2 mg/dL     ALT 17 7 - 45 U/L   Lipase   Result Value Ref  Range     Lipase 11 9 - 82 U/L   Protime-INR   Result Value Ref Range     Protime 12.8 9.8 - 12.8 seconds     INR 1.1 0.9 - 1.1      .        Assessment/Plan        Assessment & Plan  UTI (urinary tract infection), bacterial  79-year-old female with past medical history of hypertension, hyperlipidemia, diabetes mellitus, atrial fibrillation and valvular heart disease (No Ac, aspirin only), Graves' disease, Sjogren's, osteoporosis, multiple spinal surgeries w/ most recent 3/2024 , appendectomy, JONATHAN, and anxiety.  Patient presents with abdominal pain and bloody stools.      #Suspected ischemic colitis versus possible mild diverticulitis     Telemetry monitoring  Continue with Zosyn empirically  If patient does develop diarrhea would collect stool samples  Consult gastroenterology, appreciate input.    Trend H&H, transfuse for hemoglobin less than 7 or patient becomes symptomatic.  Monitor for overt bleeding  Tylenol and tramadol as needed for pain  Clear liquid diet today n.p.o. after midnight  Hold aspirin  CBC and BMP in a.m.     #Renal insufficiency  IV fluids and repeat labs     Chronic issues:  #Hypertension  #Hyperlipidemia  #Non-insulin-dependent type 2 diabetes mellitus  #History of atrial fibrillation not on AC, currently sinus rhythm  #History of valvular heart disease  #Graves' disease  #Sjogren's  #Osteoporosis   #Several spinal surgeries     Continue with patient's home medications as appropriate  Sliding scale insulin while hospitalized  Hypoglycemia protocol     #DVT prophylaxis  SCDs  Hold chemoprophylaxis    Patient fully evaluated and plan as above              Revision History     Patient fully evaluated on September 20.  Continue to monitor H&H.  Heart rate still elevated.  Digoxin given earlier.  Recheck labs in AM.    Patient fully evaluated 9/21/24, awake, alert, and appropriate, resting in bed. Patient seen by cardiology with plan -  Clinical course:  - 9/21/2024: Currently in sinus rhythm;  agree with increasing metoprolol XL 50 mg twice daily; okay to stop diltiazem GGT; systemic anticoagulation be deferred in setting of recent GI bleeding     Overall Recommendations:  1.  Paroxysmal atrial fibrillation  - Likely exacerbated by abdominal pain, colitis  - Will defer systemic anticoagulation at this time in light of recent melena; given elevated [id number] can reconsider systemic anticoagulation with DOAC once acceptable risk from a GI bleeding standpoint  - Status post rate control diltiazem GGT  - Status post loading with with IV digoxin as above  - Continue metoprolol XL to 50 mg twice daily     2.  Colitis  - Possibly ischemic  - ?  Cardioembolic in setting of atrial fibrillation  - Further workup with endoscopy as per GI     3.  Hypertension  - Continue lisinopril, metoprolol  - Hold antihypertensive therapy for MAP less than 60     Patient denies chest pain, SOB, and/or acute difficulties at this time. Patient medications adjusted - deescalated IV diltiazem GGT, increased metoprolol XL 50 mg twice daily; medications and labs reviewed, continue current and repeat labs in the AM. Diet advanced to regular diet. Urine culture no growth - final result. Patient aware and agreeable to current plan, continue plan as above. I spent 50 minutes in the professional and overall care of this patient.   Patient fully evaluated on September 22 and heart rate still slightly tacky metoprolol increased to 100 mg twice daily.  Abdominal pain improved and patient clinically tolerating a regular diet.  Zosyn discontinued and transition to oral Augmentin.  Recheck labs in AM.         Tripp Dexter MD

## 2024-09-22 NOTE — CARE PLAN
The patient's goals for the shift include rest    The clinical goals for the shift include safety and controlled HR    Over the shift, the patient's heart rate will remain stable and she will refrain from further complications

## 2024-09-23 ENCOUNTER — PHARMACY VISIT (OUTPATIENT)
Dept: PHARMACY | Facility: CLINIC | Age: 79
End: 2024-09-23
Payer: MEDICARE

## 2024-09-23 ENCOUNTER — TELEPHONE (OUTPATIENT)
Dept: GASTROENTEROLOGY | Facility: CLINIC | Age: 79
End: 2024-09-23
Payer: MEDICARE

## 2024-09-23 ENCOUNTER — APPOINTMENT (OUTPATIENT)
Dept: CARDIOLOGY | Facility: HOSPITAL | Age: 79
DRG: 394 | End: 2024-09-23
Payer: MEDICARE

## 2024-09-23 VITALS
OXYGEN SATURATION: 95 % | HEART RATE: 76 BPM | HEIGHT: 62 IN | WEIGHT: 137.7 LBS | BODY MASS INDEX: 25.34 KG/M2 | DIASTOLIC BLOOD PRESSURE: 82 MMHG | RESPIRATION RATE: 18 BRPM | TEMPERATURE: 97.2 F | SYSTOLIC BLOOD PRESSURE: 202 MMHG

## 2024-09-23 LAB
ALBUMIN SERPL BCP-MCNC: 3.2 G/DL (ref 3.4–5)
ALP SERPL-CCNC: 66 U/L (ref 33–136)
ALT SERPL W P-5'-P-CCNC: 12 U/L (ref 7–45)
ANION GAP SERPL CALC-SCNC: 11 MMOL/L (ref 10–20)
AST SERPL W P-5'-P-CCNC: 14 U/L (ref 9–39)
ATRIAL RATE: 80 BPM
BASOPHILS # BLD AUTO: 0.07 X10*3/UL (ref 0–0.1)
BASOPHILS NFR BLD AUTO: 0.6 %
BILIRUB SERPL-MCNC: 0.3 MG/DL (ref 0–1.2)
BUN SERPL-MCNC: 17 MG/DL (ref 6–23)
CALCIUM SERPL-MCNC: 7.9 MG/DL (ref 8.6–10.3)
CHLORIDE SERPL-SCNC: 109 MMOL/L (ref 98–107)
CO2 SERPL-SCNC: 26 MMOL/L (ref 21–32)
CREAT SERPL-MCNC: 0.73 MG/DL (ref 0.5–1.05)
EGFRCR SERPLBLD CKD-EPI 2021: 84 ML/MIN/1.73M*2
EOSINOPHIL # BLD AUTO: 0.15 X10*3/UL (ref 0–0.4)
EOSINOPHIL NFR BLD AUTO: 1.2 %
ERYTHROCYTE [DISTWIDTH] IN BLOOD BY AUTOMATED COUNT: 13.3 % (ref 11.5–14.5)
GLUCOSE BLD MANUAL STRIP-MCNC: 107 MG/DL (ref 74–99)
GLUCOSE BLD MANUAL STRIP-MCNC: 110 MG/DL (ref 74–99)
GLUCOSE BLD MANUAL STRIP-MCNC: 116 MG/DL (ref 74–99)
GLUCOSE SERPL-MCNC: 113 MG/DL (ref 74–99)
HCT VFR BLD AUTO: 33.2 % (ref 36–46)
HGB BLD-MCNC: 11 G/DL (ref 12–16)
IMM GRANULOCYTES # BLD AUTO: 0.58 X10*3/UL (ref 0–0.5)
IMM GRANULOCYTES NFR BLD AUTO: 4.8 % (ref 0–0.9)
LYMPHOCYTES # BLD AUTO: 3.53 X10*3/UL (ref 0.8–3)
LYMPHOCYTES NFR BLD AUTO: 29.3 %
MCH RBC QN AUTO: 28.5 PG (ref 26–34)
MCHC RBC AUTO-ENTMCNC: 33.1 G/DL (ref 32–36)
MCV RBC AUTO: 86 FL (ref 80–100)
MONOCYTES # BLD AUTO: 1.02 X10*3/UL (ref 0.05–0.8)
MONOCYTES NFR BLD AUTO: 8.5 %
NEUTROPHILS # BLD AUTO: 6.71 X10*3/UL (ref 1.6–5.5)
NEUTROPHILS NFR BLD AUTO: 55.6 %
NRBC BLD-RTO: 0 /100 WBCS (ref 0–0)
P AXIS: 63 DEGREES
P OFFSET: 203 MS
P ONSET: 140 MS
PLATELET # BLD AUTO: 241 X10*3/UL (ref 150–450)
POTASSIUM SERPL-SCNC: 3.6 MMOL/L (ref 3.5–5.3)
PR INTERVAL: 158 MS
PROT SERPL-MCNC: 5.5 G/DL (ref 6.4–8.2)
Q ONSET: 219 MS
QRS COUNT: 13 BEATS
QRS DURATION: 84 MS
QT INTERVAL: 406 MS
QTC CALCULATION(BAZETT): 468 MS
QTC FREDERICIA: 447 MS
R AXIS: 56 DEGREES
RBC # BLD AUTO: 3.86 X10*6/UL (ref 4–5.2)
SODIUM SERPL-SCNC: 142 MMOL/L (ref 136–145)
T AXIS: 53 DEGREES
T OFFSET: 422 MS
VENTRICULAR RATE: 80 BPM
WBC # BLD AUTO: 12.1 X10*3/UL (ref 4.4–11.3)

## 2024-09-23 PROCEDURE — 85025 COMPLETE CBC W/AUTO DIFF WBC: CPT | Performed by: INTERNAL MEDICINE

## 2024-09-23 PROCEDURE — RXMED WILLOW AMBULATORY MEDICATION CHARGE

## 2024-09-23 PROCEDURE — 36415 COLL VENOUS BLD VENIPUNCTURE: CPT | Performed by: INTERNAL MEDICINE

## 2024-09-23 PROCEDURE — 2500000001 HC RX 250 WO HCPCS SELF ADMINISTERED DRUGS (ALT 637 FOR MEDICARE OP): Performed by: NURSE PRACTITIONER

## 2024-09-23 PROCEDURE — 82947 ASSAY GLUCOSE BLOOD QUANT: CPT

## 2024-09-23 PROCEDURE — 2500000001 HC RX 250 WO HCPCS SELF ADMINISTERED DRUGS (ALT 637 FOR MEDICARE OP): Performed by: INTERNAL MEDICINE

## 2024-09-23 PROCEDURE — 93005 ELECTROCARDIOGRAM TRACING: CPT

## 2024-09-23 PROCEDURE — 2500000004 HC RX 250 GENERAL PHARMACY W/ HCPCS (ALT 636 FOR OP/ED): Performed by: NURSE PRACTITIONER

## 2024-09-23 PROCEDURE — 80053 COMPREHEN METABOLIC PANEL: CPT | Performed by: INTERNAL MEDICINE

## 2024-09-23 RX ORDER — ACETAMINOPHEN 325 MG/1
650 TABLET ORAL EVERY 4 HOURS PRN
Qty: 30 TABLET | Refills: 0 | Status: SHIPPED | OUTPATIENT
Start: 2024-09-23

## 2024-09-23 RX ORDER — METOPROLOL TARTRATE 100 MG/1
100 TABLET ORAL 2 TIMES DAILY
Qty: 60 TABLET | Refills: 0 | Status: SHIPPED | OUTPATIENT
Start: 2024-09-23 | End: 2024-10-23

## 2024-09-23 RX ORDER — AMOXICILLIN AND CLAVULANATE POTASSIUM 875; 125 MG/1; MG/1
1 TABLET, FILM COATED ORAL EVERY 12 HOURS SCHEDULED
Qty: 20 TABLET | Refills: 0 | Status: SHIPPED | OUTPATIENT
Start: 2024-09-23 | End: 2024-10-03

## 2024-09-23 RX ORDER — METOPROLOL TARTRATE 100 MG/1
100 TABLET ORAL 2 TIMES DAILY
Qty: 60 TABLET | Refills: 0 | Status: SHIPPED | OUTPATIENT
Start: 2024-09-23 | End: 2024-09-23

## 2024-09-23 RX ORDER — AMOXICILLIN AND CLAVULANATE POTASSIUM 875; 125 MG/1; MG/1
1 TABLET, FILM COATED ORAL EVERY 12 HOURS SCHEDULED
Qty: 20 TABLET | Refills: 0 | Status: SHIPPED | OUTPATIENT
Start: 2024-09-23 | End: 2024-09-23

## 2024-09-23 ASSESSMENT — PAIN SCALES - GENERAL: PAINLEVEL_OUTOF10: 0 - NO PAIN

## 2024-09-23 ASSESSMENT — COGNITIVE AND FUNCTIONAL STATUS - GENERAL
DRESSING REGULAR LOWER BODY CLOTHING: A LITTLE
DAILY ACTIVITIY SCORE: 23
CLIMB 3 TO 5 STEPS WITH RAILING: A LITTLE
DRESSING REGULAR LOWER BODY CLOTHING: A LITTLE
MOBILITY SCORE: 23
MOBILITY SCORE: 23
DAILY ACTIVITIY SCORE: 23
CLIMB 3 TO 5 STEPS WITH RAILING: A LITTLE

## 2024-09-23 NOTE — NURSING NOTE
Met with patient at the bedside. Discharge Planning discussed. AVS updated with follow-ups, education, and medication information. Preparing for your Discharge paper filled out. Verified/ entered a PCP and pharmacy. New medications and side effects discussed. All questions answered.  Updated nurse on this info. AVS printed and hi-lighted.

## 2024-09-23 NOTE — CARE PLAN
The patient's goals for the shift include rest    The clinical goals for the shift include patient will maintain comfort and safety    Over the shift, the patient will maintain comfort and safety

## 2024-09-23 NOTE — DISCHARGE SUMMARY
Discharge Diagnosis  UTI (urinary tract infection), bacterial    Issues Requiring Follow-Up  Patient was evaluated on September 23. Patient is cleared for discharge. She was instructed to continue taking her home aspirin and metoprolol 100 mg BID. She was also instructed to follow up outpatient for anticoagulant management and future colonoscopy. Patient understands the plan and is agreeable.     Discharge Meds     Medication List      START taking these medications     acetaminophen 325 mg tablet; Commonly known as: Tylenol; Take 2 tablets   (650 mg) by mouth every 4 hours if needed for fever (temp greater than   38.0 C) (greater than or equal to 38 C).   amoxicillin-pot clavulanate 875-125 mg tablet; Commonly known as:   Augmentin; Take 1 tablet by mouth every 12 hours for 10 days.   metoprolol tartrate 100 mg tablet; Commonly known as: Lopressor; Take 1   tablet (100 mg) by mouth 2 times a day.     CONTINUE taking these medications     Allegra Allergy 180 mg tablet; Generic drug: fexofenadine   aspirin 81 mg EC tablet   D3-2000 50 mcg (2,000 unit) capsule; Generic drug: cholecalciferol   fluticasone 50 mcg/actuation nasal spray; Commonly known as: Flonase   hydroxychloroquine 200 mg tablet; Commonly known as: Plaquenil   lisinopril 20 mg tablet   metFORMIN  mg 24 hr tablet; Commonly known as: Glucophage-XR   omeprazole 40 mg DR capsule; Commonly known as: PriLOSEC   Synthroid 50 mcg tablet; Generic drug: levothyroxine     STOP taking these medications     metoprolol succinate XL 50 mg 24 hr tablet; Commonly known as: Toprol-XL       Test Results Pending At Discharge  Pending Labs       No current pending labs.            Hospital Course     Subjective  Patient fully evaluated on September 20.  Continue to monitor H&H.  Heart rate still elevated.  Digoxin given earlier.  Recheck labs in AM.              Objective  Last Recorded Vitals  /58 (BP Location: Right arm)   Pulse 63   Temp 36 °C (96.8 °F)  (Temporal)   Resp 18   Wt 62.5 kg (137 lb 11.2 oz)   SpO2 96%   Intake/Output last 3 Shifts:     Intake/Output Summary (Last 24 hours) at 9/22/2024 1444  Last data filed at 9/22/2024 0424      Gross per 24 hour   Intake 100 ml   Output --   Net 100 ml         Admission Weight  Weight: 61.2 kg (135 lb) (09/19/24 1019)     Daily Weight  09/19/24 : 62.5 kg (137 lb 11.2 oz)     Image Results  ECG 12 lead  Normal sinus rhythm  Left ventricular hypertrophy with repolarization abnormality ( Sokolow-Evans )  Abnormal ECG  No previous ECGs available        Physical Exam     Relevant Results                       Assessment/Plan  This patient currently has cardiac telemetry ordered; if you would like to modify or discontinue the telemetry order, click hereto go to the orders activity to modify/discontinue the order.                          Assessment & Plan  UTI (urinary tract infection), bacterial  79-year-old female with past medical history of hypertension, hyperlipidemia, diabetes mellitus, atrial fibrillation and valvular heart disease (No Ac, aspirin only), Graves' disease, Sjogren's, osteoporosis, multiple spinal surgeries w/ most recent 3/2024 , appendectomy, JONATHAN, and anxiety.  Patient presents with abdominal pain and bloody stools.         Tripp Dexter MD  Physician  Internal Medicine     H&P      Addendum     Date of Service: 9/19/2024  5:06 PM      Addendum        Expand All Collapse All    History Of Present Illness  Nicki Paulson is a 79-year-old female with past medical history of hypertension, hyperlipidemia, diabetes mellitus, atrial fibrillation and valvular heart disease (No Ac, aspirin only), Graves' disease, Sjogren's, osteoporosis, multiple spinal surgeries w/ most recent 3/2024 , appendectomy, JONATHAN, and anxiety.  Patient presents with abdominal pain and bloody stools.  Reports abdominal pain has been generalized for proximately 1 week.  She felt that it was likely diverticulitis and has  decreased her oral intake and was attempting to manage until seeing Dr. Ralph on Tuesday.  Yesterday evening she developed bloody bowel movements as well, describes as bright red with some darker clots.  She has never had blood in her stools with prior episodes of diverticulitis.  She has had a total of 4 bloody movements. denies any diarrhea.  Has not been having very many bowel movements due to decreased oral intake.  Reports nausea without vomiting.  No prior history of GI bleed.  She does take 400 to 600 mg ibuprofen only several times weekly.  Denies any fevers, chills, sweats, recent illness, chest pains, palpitations, cough, wheezing, shortness of breath, edema, wounds, or urinary complaints.  She did take her blood pressure medications prior to coming in this morning.  States she had a colonoscopy several years ago with Dr. Kim, she does not recall history of significant polyps.  Colonoscopy was reviewed and was done in August 2022 at which time diverticular disease was noted as well as an area with purulent drainage concerning for diverticulitis, however patient had been asymptomatic prior to going for colonoscopy.     ER course: Afebrile, hypertensive with SBP 190s to 200s/70s to 80s.  WBC 20.6, CBC otherwise.  INR 1.1. lipase 11, glucose 145, BUN 32, creatinine 1.24 (baseline 0.8-0.9). UA negative nitrates, small leukocyte esterase.     CT angio A/P negative for active GI bleed.  Noted diffuse hyperemia, wall thickening, surrounding soft tissue stranding involving the descending colon from the level of the splenic flexure to the distal sigmoid colon detailed above.  Scattered diverticula without evidence of microperforation or adjacent abscess formation. Urine culture in process. Patient received Zosyn and 1 L normal saline bolus.     History: As above  Past surgical history: Saphenous vein ablation, Knee replacement, lumbar Lumbar laminectomy, b/l foraminotomy 2-4; T spine T12 laminectomy, cervical  fusion, tonsillectomy, total abdominal hysterectomy, appendectomy, and bladder suspension surgery.  Social history: Non-smoker, no illicit drug use, alcohol abuse.  Family history: Noncontributory     Past Medical History  Medical History           Past Medical History:   Diagnosis Date    Other chest pain       Chest pressure    Personal history of other endocrine, nutritional and metabolic disease       History of type 2 diabetes mellitus    Personal history of other specified conditions       History of palpitations    Varicose veins of unspecified lower extremity with other complications       Varicose veins of leg with complications            Surgical History  Surgical History             Past Surgical History:   Procedure Laterality Date    BLADDER SURGERY   07/05/2018     Bladder Surgery            Social History  She reports that she has never smoked. She has never used smokeless tobacco. She reports that she does not drink alcohol and does not use drugs.     Family History  Family History   No family history on file.         Allergies  Atorvastatin, Ciprofloxacin, and Bactrim [sulfamethoxazole-trimethoprim]     Review of Systems     10 point review of systems negative except as noted above in HPI     Physical Exam  Constitutional:       General: She is awake. She is not in acute distress.     Appearance: Normal appearance. She is not toxic-appearing.   HENT:      Head: Atraumatic.      Nose: Nose normal.      Mouth/Throat:      Mouth: Mucous membranes are moist.   Eyes:      Conjunctiva/sclera: Conjunctivae normal.   Cardiovascular:      Rate and Rhythm: Normal rate and regular rhythm.      Pulses: Normal pulses.      Heart sounds: No murmur heard.  Pulmonary:      Effort: Pulmonary effort is normal. No respiratory distress.      Breath sounds: Normal breath sounds.   Abdominal:      General: Bowel sounds are normal. There is no distension.      Palpations: Abdomen is soft.      Tenderness: There is  "abdominal tenderness. There is no guarding.   Musculoskeletal:         General: No swelling, deformity or signs of injury. Normal range of motion.      Cervical back: Neck supple.      Right lower leg: No edema.      Left lower leg: No edema.   Skin:     General: Skin is warm and dry.      Capillary Refill: Capillary refill takes less than 2 seconds.      Findings: No ecchymosis, erythema or wound.   Neurological:      General: No focal deficit present.      Mental Status: She is alert and oriented to person, place, and time.   Psychiatric:         Mood and Affect: Mood normal.         Behavior: Behavior is cooperative.            Last Recorded Vitals  Blood pressure (!) 198/81, pulse 83, temperature 36.3 °C (97.3 °F), temperature source Temporal, resp. rate (!) 25, height 1.575 m (5' 2\"), weight 61.2 kg (135 lb), SpO2 94%.     Relevant Results                  Results for orders placed or performed during the hospital encounter of 09/19/24 (from the past 24 hour(s))   Urinalysis with Reflex Culture and Microscopic   Result Value Ref Range     Color, Urine Light-Yellow Light-Yellow, Yellow, Dark-Yellow     Appearance, Urine Clear Clear     Specific Gravity, Urine 1.012 1.005 - 1.035     pH, Urine 5.0 5.0, 5.5, 6.0, 6.5, 7.0, 7.5, 8.0     Protein, Urine NEGATIVE NEGATIVE, 10 (TRACE), 20 (TRACE) mg/dL     Glucose, Urine Normal Normal mg/dL     Blood, Urine 0.03 (TRACE) (A) NEGATIVE     Ketones, Urine NEGATIVE NEGATIVE mg/dL     Bilirubin, Urine NEGATIVE NEGATIVE     Urobilinogen, Urine Normal Normal mg/dL     Nitrite, Urine NEGATIVE NEGATIVE     Leukocyte Esterase, Urine 25 Bren/µL (A) NEGATIVE   Microscopic Only, Urine   Result Value Ref Range     WBC, Urine 11-20 (A) 1-5, NONE /HPF     WBC Clumps, Urine OCCASIONAL Reference range not established. /HPF     RBC, Urine 1-2 NONE, 1-2, 3-5 /HPF     Bacteria, Urine 1+ (A) NONE SEEN /HPF     Mucus, Urine FEW Reference range not established. /LPF   CBC and Auto Differential "   Result Value Ref Range     WBC 20.6 (H) 4.4 - 11.3 x10*3/uL     nRBC 0.0 0.0 - 0.0 /100 WBCs     RBC 4.85 4.00 - 5.20 x10*6/uL     Hemoglobin 14.2 12.0 - 16.0 g/dL     Hematocrit 42.8 36.0 - 46.0 %     MCV 88 80 - 100 fL     MCH 29.3 26.0 - 34.0 pg     MCHC 33.2 32.0 - 36.0 g/dL     RDW 13.2 11.5 - 14.5 %     Platelets 296 150 - 450 x10*3/uL     Neutrophils % 79.1 40.0 - 80.0 %     Immature Granulocytes %, Automated 0.6 0.0 - 0.9 %     Lymphocytes % 12.2 13.0 - 44.0 %     Monocytes % 8.0 2.0 - 10.0 %     Eosinophils % 0.0 0.0 - 6.0 %     Basophils % 0.1 0.0 - 2.0 %     Neutrophils Absolute 16.25 (H) 1.60 - 5.50 x10*3/uL     Immature Granulocytes Absolute, Automated 0.12 0.00 - 0.50 x10*3/uL     Lymphocytes Absolute 2.51 0.80 - 3.00 x10*3/uL     Monocytes Absolute 1.65 (H) 0.05 - 0.80 x10*3/uL     Eosinophils Absolute 0.00 0.00 - 0.40 x10*3/uL     Basophils Absolute 0.03 0.00 - 0.10 x10*3/uL   Magnesium   Result Value Ref Range     Magnesium 2.01 1.60 - 2.40 mg/dL   Comprehensive metabolic panel   Result Value Ref Range     Glucose 145 (H) 74 - 99 mg/dL     Sodium 138 136 - 145 mmol/L     Potassium 4.1 3.5 - 5.3 mmol/L     Chloride 103 98 - 107 mmol/L     Bicarbonate 24 21 - 32 mmol/L     Anion Gap 15 10 - 20 mmol/L     Urea Nitrogen 32 (H) 6 - 23 mg/dL     Creatinine 1.24 (H) 0.50 - 1.05 mg/dL     eGFR 44 (L) >60 mL/min/1.73m*2     Calcium 9.4 8.6 - 10.3 mg/dL     Albumin 4.3 3.4 - 5.0 g/dL     Alkaline Phosphatase 83 33 - 136 U/L     Total Protein 7.7 6.4 - 8.2 g/dL     AST 19 9 - 39 U/L     Bilirubin, Total 0.8 0.0 - 1.2 mg/dL     ALT 17 7 - 45 U/L   Lipase   Result Value Ref Range     Lipase 11 9 - 82 U/L   Protime-INR   Result Value Ref Range     Protime 12.8 9.8 - 12.8 seconds     INR 1.1 0.9 - 1.1      .        Assessment/Plan        Assessment & Plan  UTI (urinary tract infection), bacterial  79-year-old female with past medical history of hypertension, hyperlipidemia, diabetes mellitus, atrial fibrillation  and valvular heart disease (No Ac, aspirin only), Graves' disease, Sjogren's, osteoporosis, multiple spinal surgeries w/ most recent 3/2024 , appendectomy, JONATHAN, and anxiety.  Patient presents with abdominal pain and bloody stools.      #Suspected ischemic colitis versus possible mild diverticulitis     Telemetry monitoring  Continue with Zosyn empirically  If patient does develop diarrhea would collect stool samples  Consult gastroenterology, appreciate input.    Trend H&H, transfuse for hemoglobin less than 7 or patient becomes symptomatic.  Monitor for overt bleeding  Tylenol and tramadol as needed for pain  Clear liquid diet today n.p.o. after midnight  Hold aspirin  CBC and BMP in a.m.     #Renal insufficiency  IV fluids and repeat labs     Chronic issues:  #Hypertension  #Hyperlipidemia  #Non-insulin-dependent type 2 diabetes mellitus  #History of atrial fibrillation not on AC, currently sinus rhythm  #History of valvular heart disease  #Graves' disease  #Sjogren's  #Osteoporosis   #Several spinal surgeries     Continue with patient's home medications as appropriate  Sliding scale insulin while hospitalized  Hypoglycemia protocol     #DVT prophylaxis  SCDs  Hold chemoprophylaxis     Patient fully evaluated and plan as above               Revision History     Patient fully evaluated on September 20.  Continue to monitor H&H.  Heart rate still elevated.  Digoxin given earlier.  Recheck labs in AM.     Patient fully evaluated 9/21/24, awake, alert, and appropriate, resting in bed. Patient seen by cardiology with plan -  Clinical course:  - 9/21/2024: Currently in sinus rhythm; agree with increasing metoprolol XL 50 mg twice daily; okay to stop diltiazem GGT; systemic anticoagulation be deferred in setting of recent GI bleeding     Overall Recommendations:  1.  Paroxysmal atrial fibrillation  - Likely exacerbated by abdominal pain, colitis  - Will defer systemic anticoagulation at this time in light of recent  melena; given elevated [id number] can reconsider systemic anticoagulation with DOAC once acceptable risk from a GI bleeding standpoint  - Status post rate control diltiazem GGT  - Status post loading with with IV digoxin as above  - Continue metoprolol XL to 50 mg twice daily     2.  Colitis  - Possibly ischemic  - ?  Cardioembolic in setting of atrial fibrillation  - Further workup with endoscopy as per GI     3.  Hypertension  - Continue lisinopril, metoprolol  - Hold antihypertensive therapy for MAP less than 60     Patient denies chest pain, SOB, and/or acute difficulties at this time. Patient medications adjusted - deescalated IV diltiazem GGT, increased metoprolol XL 50 mg twice daily; medications and labs reviewed, continue current and repeat labs in the AM. Diet advanced to regular diet. Urine culture no growth - final result. Patient aware and agreeable to current plan, continue plan as above. I spent 50 minutes in the professional and overall care of this patient.   Patient fully evaluated on September 22 and heart rate still slightly tacky metoprolol increased to 100 mg twice daily.  Abdominal pain improved and patient clinically tolerating a regular diet.  Zosyn discontinued and transition to oral Augmentin.  Recheck labs in AM.        Tripp Dexter MD    Pertinent Physical Exam At Time of Discharge  Physical Exam  Patient was evaluated on September 23. Patient is cleared for discharge. She was instructed to continue taking her home aspirin and metoprolol 100 mg BID. She was also instructed to follow up outpatient for anticoagulant management and future colonoscopy. Patient understands the plan and is agreeable.     Patient was awake, alert and oriented. She denies any acute complaints. Medications and labs were reviewed. Patient was aware and agreeable to the plan stated above. I spent 30 minutes with the patient in a professional and overall care of this patient.     Outpatient Follow-Up  Future  Appointments   Date Time Provider Department Center   9/10/2025  9:00 AM PAR [facility] 3 ECHO ROOM 1 [id number] [facility]   9/10/2025 10:30 AM PAR [facility] 3 VASC LAB [id number] [facility]   9/10/2025 11:45 AM William Roman MD [id number] [place]         SERG Denney-S

## 2024-09-23 NOTE — PROGRESS NOTES
Discharge in for today. Plan for home with no needs, confirmed with patient. IMM explained, given and signed.   FAISAL ABDUL RN TCC

## 2024-09-23 NOTE — TELEPHONE ENCOUNTER
Called and spoke with patient to schedule a colon in 6 weeks per Dr Rondon and Magdalena, patient wants to talk to her Primary doctor first and also wants to see if she can schedule at [facility] and out further then the 6 week.

## 2024-09-26 LAB
ATRIAL RATE: 202 BPM
Q ONSET: 220 MS
QRS COUNT: 25 BEATS
QRS DURATION: 82 MS
QT INTERVAL: 316 MS
QTC CALCULATION(BAZETT): 507 MS
QTC FREDERICIA: 433 MS
R AXIS: 39 DEGREES
T AXIS: 219 DEGREES
T OFFSET: 378 MS
VENTRICULAR RATE: 155 BPM

## 2024-09-28 NOTE — DOCUMENTATION CLARIFICATION NOTE
"    PATIENT:               KELLY BERNAL  ACCT #:                  [id number]  MRN:                       [id number]  :                       1945  ADMIT DATE:       2024 10:21 AM  DISCH DATE:        2024 1:45 PM  RESPONDING PROVIDER #:        [id number]          PROVIDER RESPONSE TEXT:    Sepsis with renal organ dysfunction of - DEQUAN    CDI QUERY TEXT:    Clarification    Instruction:  Based on your assessment of the patient and the clinical information, please provide the requested documentation by clicking on the appropriate radio button and enter any additional information if prompted.    Question: Is there a diagnosis indicative of a patient meeting SIRS criteria and with organ dysfunction in the setting of UTI/Colitis    When answering this query, please exercise your independent professional judgment. The fact that a question is being asked, does not imply that any particular answer is desired or expected.    The patient's clinical indicators include:  Clinical Information: 78 yo presenting with abdominal pain    Clinical Indicators: H and P  \"Patient presents with abdominal pain and bloody stools.  Reports abdominal pain has been generalized for proximately 1 week.  She felt that it was likely diverticulitis and has decreased her oral intake and was attempting to manage\", \"UTI\", \"Suspected ischemic colitis versus possible mild diverticulitis\"    24 Consult Dr. Verdin  \"mild DEQUAN\"  24 Significnat event note [place]  \"patient's HR was in the 170s and in a fib\"  24 Consult Dr. Kamara  \"Paroxysmal atrial fibrillation - Likely exacerbated by abdominal pain, colitis\"    24  WBCs 20.6  RR  16-30     UA with 1 plus bacteria, 25 leukocyte esterase    Treatment: IV NS bolus 1000 mls, IV Zosyn 4.5 g 24, IV Zosyn 3.375 g 24-24, Cardizem gtt, IV digoxin 250 mcg, IV digoxin 500 mgc, lab monitoring    Risk Factors:78 yo with history of recurrent diverticulitis, DM and " afib  Options provided:  -- Sepsis with renal organ dysfunction of - DEQUAN  -- Sepsis with cardiac organ dysfunction of afib with RVR  -- Sepsis with multi-system organ dysfunction of renal organ dysfunction - DEQUAN, cardiac organ dysfunction - afib RVR  -- Sepsis with other organ dysfunction, Please specify sepsis associated organ dysfunction below  -- Patient treated for UTI/Colitis  -- Other - I will add my own diagnosis  -- Refer to Clinical Documentation Reviewer    Query created by: Madhavi Russell on 9/23/2024 12:57 PM      Electronically signed by:  HEIKE WEST MD 9/28/2024 11:14 AM

## 2024-10-03 ENCOUNTER — TELEPHONE (OUTPATIENT)
Dept: CARDIOLOGY | Facility: CLINIC | Age: 79
End: 2024-10-03
Payer: MEDICARE

## 2024-10-03 NOTE — TELEPHONE ENCOUNTER
Patient called requesting a hospital follow up appointment. Patient was seen in the [facility].  Requesting sooner appointment than November.

## 2024-10-10 ENCOUNTER — OFFICE VISIT (OUTPATIENT)
Dept: CARDIOLOGY | Facility: CLINIC | Age: 79
End: 2024-10-10
Payer: MEDICARE

## 2024-10-10 VITALS
WEIGHT: 138 LBS | DIASTOLIC BLOOD PRESSURE: 70 MMHG | SYSTOLIC BLOOD PRESSURE: 128 MMHG | BODY MASS INDEX: 25.4 KG/M2 | HEART RATE: 64 BPM | OXYGEN SATURATION: 98 % | HEIGHT: 62 IN

## 2024-10-10 DIAGNOSIS — R00.2 PALPITATIONS: ICD-10-CM

## 2024-10-10 DIAGNOSIS — I10 ESSENTIAL HYPERTENSION: ICD-10-CM

## 2024-10-10 DIAGNOSIS — E78.5 HYPERLIPIDEMIA, UNSPECIFIED HYPERLIPIDEMIA TYPE: ICD-10-CM

## 2024-10-10 DIAGNOSIS — I48.0 PAROXYSMAL ATRIAL FIBRILLATION (MULTI): Primary | ICD-10-CM

## 2024-10-10 DIAGNOSIS — I35.1 MODERATE AORTIC INSUFFICIENCY: ICD-10-CM

## 2024-10-10 DIAGNOSIS — I34.0 MODERATE MITRAL REGURGITATION: ICD-10-CM

## 2024-10-10 DIAGNOSIS — I07.1 MODERATE TRICUSPID REGURGITATION: ICD-10-CM

## 2024-10-10 DIAGNOSIS — K55.9 ISCHEMIC COLITIS (MULTI): ICD-10-CM

## 2024-10-10 DIAGNOSIS — I65.23 BILATERAL CAROTID ARTERY STENOSIS: ICD-10-CM

## 2024-10-10 PROBLEM — K76.0 HEPATIC STEATOSIS: Status: ACTIVE | Noted: 2024-02-11

## 2024-10-10 PROBLEM — M48.05 SPINAL STENOSIS OF THORACOLUMBAR REGION: Status: ACTIVE | Noted: 2024-03-04

## 2024-10-10 PROBLEM — M79.604 LEG PAIN, ANTERIOR, RIGHT: Status: ACTIVE | Noted: 2024-02-21

## 2024-10-10 PROBLEM — R07.89 CHEST DISCOMFORT: Status: RESOLVED | Noted: 2024-10-10 | Resolved: 2024-10-10

## 2024-10-10 PROBLEM — R07.89 CHEST DISCOMFORT: Status: ACTIVE | Noted: 2024-10-10

## 2024-10-10 PROBLEM — I80.9 PHLEBITIS: Status: RESOLVED | Noted: 2024-02-21 | Resolved: 2024-10-10

## 2024-10-10 PROBLEM — M35.00 SJOGREN'S SYNDROME: Status: ACTIVE | Noted: 2024-06-06

## 2024-10-10 PROBLEM — M17.9 OSTEOARTHRITIS OF KNEE: Status: ACTIVE | Noted: 2024-06-06

## 2024-10-10 PROBLEM — R35.89 POLYURIA: Status: ACTIVE | Noted: 2024-06-06

## 2024-10-10 PROBLEM — M79.89 SWELLING OF LOWER EXTREMITY: Status: ACTIVE | Noted: 2024-10-10

## 2024-10-10 PROBLEM — I80.9 PHLEBITIS: Status: ACTIVE | Noted: 2024-02-21

## 2024-10-10 PROBLEM — K08.89 PAIN IN A TOOTH OR TEETH: Status: ACTIVE | Noted: 2024-06-06

## 2024-10-10 PROBLEM — J32.1 SINUSITIS CHRONIC, FRONTAL: Status: ACTIVE | Noted: 2024-06-06

## 2024-10-10 PROCEDURE — 99214 OFFICE O/P EST MOD 30 MIN: CPT | Performed by: INTERNAL MEDICINE

## 2024-10-10 PROCEDURE — 3078F DIAST BP <80 MM HG: CPT | Performed by: INTERNAL MEDICINE

## 2024-10-10 PROCEDURE — 1036F TOBACCO NON-USER: CPT | Performed by: INTERNAL MEDICINE

## 2024-10-10 PROCEDURE — 1111F DSCHRG MED/CURRENT MED MERGE: CPT | Performed by: INTERNAL MEDICINE

## 2024-10-10 PROCEDURE — 1159F MED LIST DOCD IN RCRD: CPT | Performed by: INTERNAL MEDICINE

## 2024-10-10 PROCEDURE — 3074F SYST BP LT 130 MM HG: CPT | Performed by: INTERNAL MEDICINE

## 2024-10-10 RX ORDER — METOPROLOL TARTRATE 50 MG/1
50 TABLET ORAL 2 TIMES DAILY
Qty: 180 TABLET | Refills: 3 | Status: SHIPPED | OUTPATIENT
Start: 2024-10-10 | End: 2025-10-10

## 2024-10-10 ASSESSMENT — ENCOUNTER SYMPTOMS
HEMATOCHEZIA: 1
DYSPNEA ON EXERTION: 1

## 2024-10-10 NOTE — PATIENT INSTRUCTIONS
Take metoprolol tartrate 50 mg twice daily.    If your heart rate is irregular call us and we will start you on amiodarone and short -term Eliquis.    Since you are in a normal rhythm you can safely undergo colonoscopy.

## 2024-10-10 NOTE — PROGRESS NOTES
Subjective   Nicki Paulson is a 79 y.o. female.    Chief Complaint:  Hospital follow-up for rapid heart rate and gastrointestinal bleeding.    HPI    She presented on September 20 to the hospital with abdominal pain and bloody stools.  She developed atrial fibrillation with rapid ventricular response with heart rates to 160.  She was given IV digoxin.  Also given IV Cardizem.  She converted back to sinus rhythm.  She carefully checks her heart rates at home.  Her heart rates have been in the 70s.  They have been regular.  She feels better and has had no further bleeding episodes.    The patient underwent right knee surgery in 2018 at [facility]. Post procedure, she had an episode of atrial fibrillation. She was started on amiodarone and on Eliquis. Ultimately we stopped these medications and she has not had any recurrence of her atrial arrhythmias.     The patient's risk factors for the presence of coronary artery disease include hypertension, diabetes and hyperlipidemia. She is a nonsmoker and has never smoked. There is no definite family history of premature coronary artery disease.     The patient underwent a coronary calcium score on 12/28/17. This turned out to be zero indicating the absence of atherosclerotic coronary artery disease.     A cardiac event monitor from 1/2/18 to 1/15/18 demonstrated no evidence of atrial fibrillation.     She has a past history of saphenous vein ablation done by Dr. Dukes several years ago..      Allergies  Medication    · ciprofloxacin   Recorded By: Jazmyn Monge; 7/5/2018 2:42:51 PM   · Lipitor   Recorded By: Jazmyn Monge; 7/5/2018 2:42:51 PM     Family History  Mother    · No pertinent family history  Father    · No pertinent family history     Social History  Problems    ·    · Never a smoker   · No alcohol use     Review of Systems   Constitutional: Positive for malaise/fatigue.   Cardiovascular:  Positive for dyspnea on exertion.  "  Gastrointestinal:  Positive for hematochezia.       Current Outpatient Medications   Medication Sig Dispense Refill    acetaminophen (Tylenol) 325 mg tablet Take 2 tablets (650 mg) by mouth every 4 hours if needed for fever (temp greater than 38.0 C) (greater than or equal to 38 C). 30 tablet 0    aspirin 81 mg EC tablet Take 1 tablet (81 mg) by mouth once daily at bedtime.      cholecalciferol (D3-2000) 50 mcg (2,000 unit) capsule Take 1 capsule (50 mcg) by mouth once daily at bedtime.      fexofenadine (Allegra Allergy) 180 mg tablet Take 1 tablet (180 mg) by mouth once daily as needed.      fluticasone (Flonase) 50 mcg/actuation nasal spray Administer 1 spray into each nostril once daily as needed for allergies.      hydroxychloroquine (Plaquenil) 200 mg tablet Take 1 tablet (200 mg) by mouth once daily in the morning.      levothyroxine (Synthroid) 50 mcg tablet Take 1 tablet (50 mcg) by mouth 4 times a week. Every Monday/Tuesday/Thursday/Friday      lisinopril 20 mg tablet Take 1 tablet (20 mg) by mouth once daily.      metFORMIN  mg 24 hr tablet Take 1 tablet (500 mg) by mouth once daily in the evening. Take with meals.      omeprazole (PriLOSEC) 40 mg DR capsule Take 1 capsule (40 mg) by mouth if needed (as needed).      metoprolol tartrate (Lopressor) 50 mg tablet Take 1 tablet (50 mg) by mouth 2 times a day. 180 tablet 3     No current facility-administered medications for this visit.        Visit Vitals  /70 (BP Location: Right arm)   Pulse 64   Ht 1.575 m (5' 2\")   Wt 62.6 kg (138 lb)   SpO2 98%   BMI 25.24 kg/m²   Smoking Status Never   BSA 1.65 m²        Objective     Constitutional:       Appearance: Not in distress.   Neck:      Vascular: JVD normal.   Pulmonary:      Breath sounds: Normal breath sounds.   Cardiovascular:      Normal rate. Regular rhythm. Normal S1. Normal S2.       Murmurs: There is no murmur.      No gallop.    Pulses:     Intact distal pulses.   Edema:     Peripheral " edema absent.   Abdominal:      General: There is no distension.      Palpations: Abdomen is soft.   Neurological:      Mental Status: Alert.         Lab Review:   Lab Results   Component Value Date     09/23/2024    K 3.6 09/23/2024     (H) 09/23/2024    CO2 26 09/23/2024    BUN 17 09/23/2024    CREATININE 0.73 09/23/2024    GLUCOSE 113 (H) 09/23/2024    CALCIUM 7.9 (L) 09/23/2024     Lab Results   Component Value Date    WBC 12.1 (H) 09/23/2024    HGB 11.0 (L) 09/23/2024    HCT 33.2 (L) 09/23/2024    MCV 86 09/23/2024     09/23/2024       Assessment:    1.  Paroxysmal atrial fibrillation.  Back in sinus rhythm.  Heart rates in the 70s.  Could not tolerate high doses of metoprolol, we will give her Metroprolol tartrate 50 mg twice daily.  On this dose her heart rates are in the 70s and her blood pressure is adequately controlled.    2.  Gastrointestinal bleeding.  Ultimately will need colonoscopy.  Need to determine her risk for bleeding in the future before adding anticoagulation therapy.    3.  Hypertension.  Blood pressures are well-controlled.    4.  Valvular heart disease.  History of mitral and tricuspid regurgitation.  Will repeat echo study on her next visit.    Amiodarone may be an option for the patient in the future if she goes back into atrial fibrillation.  We may want to try to avoid anticoagulation in this patient.  It should be noted that she did have a significant bleed with her event.

## 2024-11-07 ENCOUNTER — LAB (OUTPATIENT)
Dept: LAB | Facility: LAB | Age: 79
End: 2024-11-07
Payer: MEDICARE

## 2024-11-07 DIAGNOSIS — E55.9 VITAMIN D DEFICIENCY, UNSPECIFIED: ICD-10-CM

## 2024-11-07 DIAGNOSIS — E03.9 HYPOTHYROIDISM, UNSPECIFIED: Primary | ICD-10-CM

## 2024-11-07 LAB
25(OH)D3 SERPL-MCNC: 59 NG/ML (ref 30–100)
ALBUMIN SERPL BCP-MCNC: 4 G/DL (ref 3.4–5)
ALP SERPL-CCNC: 73 U/L (ref 33–136)
ALT SERPL W P-5'-P-CCNC: 19 U/L (ref 7–45)
ANION GAP SERPL CALC-SCNC: 14 MMOL/L (ref 10–20)
AST SERPL W P-5'-P-CCNC: 22 U/L (ref 9–39)
BILIRUB SERPL-MCNC: 0.5 MG/DL (ref 0–1.2)
BUN SERPL-MCNC: 22 MG/DL (ref 6–23)
CALCIUM SERPL-MCNC: 9.3 MG/DL (ref 8.6–10.3)
CHLORIDE SERPL-SCNC: 108 MMOL/L (ref 98–107)
CO2 SERPL-SCNC: 28 MMOL/L (ref 21–32)
CREAT SERPL-MCNC: 0.86 MG/DL (ref 0.5–1.05)
EGFRCR SERPLBLD CKD-EPI 2021: 69 ML/MIN/1.73M*2
GLUCOSE SERPL-MCNC: 112 MG/DL (ref 74–99)
POTASSIUM SERPL-SCNC: 3.8 MMOL/L (ref 3.5–5.3)
PROT SERPL-MCNC: 6.5 G/DL (ref 6.4–8.2)
SODIUM SERPL-SCNC: 146 MMOL/L (ref 136–145)
TSH SERPL-ACNC: 0.01 MIU/L (ref 0.44–3.98)

## 2024-11-07 PROCEDURE — 84443 ASSAY THYROID STIM HORMONE: CPT

## 2024-11-07 PROCEDURE — 80053 COMPREHEN METABOLIC PANEL: CPT

## 2024-11-07 PROCEDURE — 82306 VITAMIN D 25 HYDROXY: CPT

## 2024-11-07 PROCEDURE — 36415 COLL VENOUS BLD VENIPUNCTURE: CPT

## 2024-12-02 ENCOUNTER — LAB (OUTPATIENT)
Dept: LAB | Facility: LAB | Age: 79
End: 2024-12-02
Payer: MEDICARE

## 2024-12-02 DIAGNOSIS — E05.90 THYROTOXICOSIS, UNSPECIFIED WITHOUT THYROTOXIC CRISIS OR STORM: Primary | ICD-10-CM

## 2024-12-02 LAB
T3FREE SERPL-MCNC: 4.7 PG/ML (ref 2.3–4.2)
T4 FREE SERPL-MCNC: 1.54 NG/DL (ref 0.61–1.12)
TSH SERPL-ACNC: <0.01 MIU/L (ref 0.44–3.98)

## 2024-12-02 PROCEDURE — 84443 ASSAY THYROID STIM HORMONE: CPT

## 2024-12-02 PROCEDURE — 83519 RIA NONANTIBODY: CPT

## 2024-12-02 PROCEDURE — 84481 FREE ASSAY (FT-3): CPT

## 2024-12-02 PROCEDURE — 36415 COLL VENOUS BLD VENIPUNCTURE: CPT

## 2024-12-02 PROCEDURE — 84445 ASSAY OF TSI GLOBULIN: CPT

## 2024-12-02 PROCEDURE — 84439 ASSAY OF FREE THYROXINE: CPT

## 2024-12-05 LAB
THYROID STIMULATING IMMUNOGLOBULIN: 232 % BASELINE
TSH RECEP AB SER-ACNC: 12 IU/L

## 2024-12-11 ENCOUNTER — TELEPHONE (OUTPATIENT)
Dept: CARDIOLOGY | Facility: CLINIC | Age: 79
End: 2024-12-11

## 2024-12-11 ENCOUNTER — HOSPITAL ENCOUNTER (INPATIENT)
Facility: HOSPITAL | Age: 79
LOS: 2 days | Discharge: HOME | End: 2024-12-13
Attending: EMERGENCY MEDICINE | Admitting: INTERNAL MEDICINE
Payer: MEDICARE

## 2024-12-11 ENCOUNTER — APPOINTMENT (OUTPATIENT)
Dept: RADIOLOGY | Facility: HOSPITAL | Age: 79
End: 2024-12-11
Payer: MEDICARE

## 2024-12-11 ENCOUNTER — APPOINTMENT (OUTPATIENT)
Dept: CARDIOLOGY | Facility: HOSPITAL | Age: 79
End: 2024-12-11
Payer: MEDICARE

## 2024-12-11 DIAGNOSIS — I48.91 ATRIAL FIBRILLATION WITH RVR (MULTI): Primary | ICD-10-CM

## 2024-12-11 DIAGNOSIS — E03.9 HYPOTHYROIDISM, UNSPECIFIED TYPE: ICD-10-CM

## 2024-12-11 DIAGNOSIS — I48.0 PAROXYSMAL ATRIAL FIBRILLATION (MULTI): ICD-10-CM

## 2024-12-11 DIAGNOSIS — I48.0 PAROXYSMAL A-FIB (MULTI): ICD-10-CM

## 2024-12-11 LAB
ALBUMIN SERPL BCP-MCNC: 4.1 G/DL (ref 3.4–5)
ALP SERPL-CCNC: 87 U/L (ref 33–136)
ALT SERPL W P-5'-P-CCNC: 23 U/L (ref 7–45)
ANION GAP SERPL CALC-SCNC: 14 MMOL/L (ref 10–20)
AST SERPL W P-5'-P-CCNC: 33 U/L (ref 9–39)
BASOPHILS # BLD AUTO: 0.04 X10*3/UL (ref 0–0.1)
BASOPHILS NFR BLD AUTO: 0.4 %
BILIRUB SERPL-MCNC: 0.4 MG/DL (ref 0–1.2)
BUN SERPL-MCNC: 19 MG/DL (ref 6–23)
CALCIUM SERPL-MCNC: 9.2 MG/DL (ref 8.6–10.3)
CARDIAC TROPONIN I PNL SERPL HS: 44 NG/L (ref 0–13)
CARDIAC TROPONIN I PNL SERPL HS: 47 NG/L (ref 0–13)
CHLORIDE SERPL-SCNC: 107 MMOL/L (ref 98–107)
CO2 SERPL-SCNC: 24 MMOL/L (ref 21–32)
CREAT SERPL-MCNC: 0.94 MG/DL (ref 0.5–1.05)
EGFRCR SERPLBLD CKD-EPI 2021: 62 ML/MIN/1.73M*2
EOSINOPHIL # BLD AUTO: 0.07 X10*3/UL (ref 0–0.4)
EOSINOPHIL NFR BLD AUTO: 0.8 %
ERYTHROCYTE [DISTWIDTH] IN BLOOD BY AUTOMATED COUNT: 13.5 % (ref 11.5–14.5)
GLUCOSE BLD MANUAL STRIP-MCNC: 117 MG/DL (ref 74–99)
GLUCOSE BLD MANUAL STRIP-MCNC: 202 MG/DL (ref 74–99)
GLUCOSE SERPL-MCNC: 191 MG/DL (ref 74–99)
HCT VFR BLD AUTO: 39.9 % (ref 36–46)
HGB BLD-MCNC: 12.7 G/DL (ref 12–16)
IMM GRANULOCYTES # BLD AUTO: 0.12 X10*3/UL (ref 0–0.5)
IMM GRANULOCYTES NFR BLD AUTO: 1.3 % (ref 0–0.9)
LYMPHOCYTES # BLD AUTO: 2.68 X10*3/UL (ref 0.8–3)
LYMPHOCYTES NFR BLD AUTO: 29 %
MAGNESIUM SERPL-MCNC: 1.66 MG/DL (ref 1.6–2.4)
MCH RBC QN AUTO: 28.1 PG (ref 26–34)
MCHC RBC AUTO-ENTMCNC: 31.8 G/DL (ref 32–36)
MCV RBC AUTO: 88 FL (ref 80–100)
MONOCYTES # BLD AUTO: 0.55 X10*3/UL (ref 0.05–0.8)
MONOCYTES NFR BLD AUTO: 6 %
NEUTROPHILS # BLD AUTO: 5.77 X10*3/UL (ref 1.6–5.5)
NEUTROPHILS NFR BLD AUTO: 62.5 %
NRBC BLD-RTO: 0 /100 WBCS (ref 0–0)
PLATELET # BLD AUTO: 278 X10*3/UL (ref 150–450)
POTASSIUM SERPL-SCNC: 4.4 MMOL/L (ref 3.5–5.3)
PROT SERPL-MCNC: 6.9 G/DL (ref 6.4–8.2)
RBC # BLD AUTO: 4.52 X10*6/UL (ref 4–5.2)
SODIUM SERPL-SCNC: 141 MMOL/L (ref 136–145)
T3FREE SERPL-MCNC: 4.2 PG/ML (ref 2.3–4.2)
T4 FREE SERPL-MCNC: 1.46 NG/DL (ref 0.61–1.12)
TSH SERPL-ACNC: <0.01 MIU/L (ref 0.44–3.98)
WBC # BLD AUTO: 9.2 X10*3/UL (ref 4.4–11.3)

## 2024-12-11 PROCEDURE — 96375 TX/PRO/DX INJ NEW DRUG ADDON: CPT

## 2024-12-11 PROCEDURE — 80053 COMPREHEN METABOLIC PANEL: CPT | Performed by: PHYSICIAN ASSISTANT

## 2024-12-11 PROCEDURE — 2500000004 HC RX 250 GENERAL PHARMACY W/ HCPCS (ALT 636 FOR OP/ED): Performed by: PHYSICIAN ASSISTANT

## 2024-12-11 PROCEDURE — 84439 ASSAY OF FREE THYROXINE: CPT | Performed by: PHYSICIAN ASSISTANT

## 2024-12-11 PROCEDURE — 99223 1ST HOSP IP/OBS HIGH 75: CPT | Performed by: NURSE PRACTITIONER

## 2024-12-11 PROCEDURE — 93005 ELECTROCARDIOGRAM TRACING: CPT

## 2024-12-11 PROCEDURE — 84443 ASSAY THYROID STIM HORMONE: CPT | Performed by: PHYSICIAN ASSISTANT

## 2024-12-11 PROCEDURE — 36415 COLL VENOUS BLD VENIPUNCTURE: CPT | Performed by: PHYSICIAN ASSISTANT

## 2024-12-11 PROCEDURE — 2060000001 HC INTERMEDIATE ICU ROOM DAILY

## 2024-12-11 PROCEDURE — 99285 EMERGENCY DEPT VISIT HI MDM: CPT | Mod: 25 | Performed by: EMERGENCY MEDICINE

## 2024-12-11 PROCEDURE — 85025 COMPLETE CBC W/AUTO DIFF WBC: CPT | Performed by: PHYSICIAN ASSISTANT

## 2024-12-11 PROCEDURE — 2500000005 HC RX 250 GENERAL PHARMACY W/O HCPCS: Performed by: PHYSICIAN ASSISTANT

## 2024-12-11 PROCEDURE — 71045 X-RAY EXAM CHEST 1 VIEW: CPT | Performed by: RADIOLOGY

## 2024-12-11 PROCEDURE — 83735 ASSAY OF MAGNESIUM: CPT | Performed by: PHYSICIAN ASSISTANT

## 2024-12-11 PROCEDURE — 71045 X-RAY EXAM CHEST 1 VIEW: CPT

## 2024-12-11 PROCEDURE — 96376 TX/PRO/DX INJ SAME DRUG ADON: CPT

## 2024-12-11 PROCEDURE — 84481 FREE ASSAY (FT-3): CPT | Mod: PARLAB | Performed by: INTERNAL MEDICINE

## 2024-12-11 PROCEDURE — 2500000001 HC RX 250 WO HCPCS SELF ADMINISTERED DRUGS (ALT 637 FOR MEDICARE OP): Performed by: NURSE PRACTITIONER

## 2024-12-11 PROCEDURE — 82947 ASSAY GLUCOSE BLOOD QUANT: CPT

## 2024-12-11 PROCEDURE — 84484 ASSAY OF TROPONIN QUANT: CPT | Performed by: PHYSICIAN ASSISTANT

## 2024-12-11 PROCEDURE — 96374 THER/PROPH/DIAG INJ IV PUSH: CPT | Mod: 59

## 2024-12-11 RX ORDER — INSULIN LISPRO 100 [IU]/ML
0-5 INJECTION, SOLUTION INTRAVENOUS; SUBCUTANEOUS
Status: DISCONTINUED | OUTPATIENT
Start: 2024-12-11 | End: 2024-12-13 | Stop reason: HOSPADM

## 2024-12-11 RX ORDER — METFORMIN HYDROCHLORIDE 500 MG/1
500 TABLET, EXTENDED RELEASE ORAL
Status: DISCONTINUED | OUTPATIENT
Start: 2024-12-11 | End: 2024-12-13 | Stop reason: HOSPADM

## 2024-12-11 RX ORDER — DILTIAZEM HCL/D5W 125 MG/125
5-15 PLASTIC BAG, INJECTION (ML) INTRAVENOUS CONTINUOUS
Status: DISCONTINUED | OUTPATIENT
Start: 2024-12-11 | End: 2024-12-13 | Stop reason: HOSPADM

## 2024-12-11 RX ORDER — FLUTICASONE PROPIONATE 50 MCG
1 SPRAY, SUSPENSION (ML) NASAL DAILY PRN
Status: DISCONTINUED | OUTPATIENT
Start: 2024-12-11 | End: 2024-12-13 | Stop reason: HOSPADM

## 2024-12-11 RX ORDER — ACETAMINOPHEN 325 MG/1
650 TABLET ORAL EVERY 4 HOURS PRN
Status: DISCONTINUED | OUTPATIENT
Start: 2024-12-11 | End: 2024-12-13 | Stop reason: HOSPADM

## 2024-12-11 RX ORDER — METOPROLOL TARTRATE 1 MG/ML
5 INJECTION, SOLUTION INTRAVENOUS ONCE
Status: COMPLETED | OUTPATIENT
Start: 2024-12-11 | End: 2024-12-11

## 2024-12-11 RX ORDER — POLYETHYLENE GLYCOL 3350 17 G/17G
17 POWDER, FOR SOLUTION ORAL DAILY
Status: DISCONTINUED | OUTPATIENT
Start: 2024-12-11 | End: 2024-12-13 | Stop reason: HOSPADM

## 2024-12-11 RX ORDER — ALBUTEROL SULFATE 90 UG/1
2 INHALANT RESPIRATORY (INHALATION) EVERY 4 HOURS PRN
Status: DISCONTINUED | OUTPATIENT
Start: 2024-12-11 | End: 2024-12-13 | Stop reason: HOSPADM

## 2024-12-11 RX ORDER — LISINOPRIL 20 MG/1
20 TABLET ORAL DAILY
Status: DISCONTINUED | OUTPATIENT
Start: 2024-12-12 | End: 2024-12-13 | Stop reason: HOSPADM

## 2024-12-11 RX ORDER — IPRATROPIUM BROMIDE AND ALBUTEROL SULFATE 2.5; .5 MG/3ML; MG/3ML
3 SOLUTION RESPIRATORY (INHALATION) EVERY 2 HOUR PRN
Status: DISCONTINUED | OUTPATIENT
Start: 2024-12-11 | End: 2024-12-13 | Stop reason: HOSPADM

## 2024-12-11 RX ORDER — METOPROLOL TARTRATE 50 MG/1
50 TABLET ORAL 2 TIMES DAILY
Status: DISCONTINUED | OUTPATIENT
Start: 2024-12-11 | End: 2024-12-13 | Stop reason: HOSPADM

## 2024-12-11 RX ORDER — ASPIRIN 81 MG/1
81 TABLET ORAL NIGHTLY
Status: DISCONTINUED | OUTPATIENT
Start: 2024-12-12 | End: 2024-12-13 | Stop reason: HOSPADM

## 2024-12-11 RX ORDER — PANTOPRAZOLE SODIUM 40 MG/1
40 TABLET, DELAYED RELEASE ORAL
Status: DISCONTINUED | OUTPATIENT
Start: 2024-12-12 | End: 2024-12-13 | Stop reason: HOSPADM

## 2024-12-11 RX ORDER — METHIMAZOLE 5 MG/1
5 TABLET ORAL DAILY
Status: DISCONTINUED | OUTPATIENT
Start: 2024-12-11 | End: 2024-12-13 | Stop reason: HOSPADM

## 2024-12-11 RX ORDER — DILTIAZEM HYDROCHLORIDE 5 MG/ML
10 INJECTION INTRAVENOUS ONCE
Status: COMPLETED | OUTPATIENT
Start: 2024-12-11 | End: 2024-12-11

## 2024-12-11 RX ORDER — ALBUTEROL SULFATE 90 UG/1
2 INHALANT RESPIRATORY (INHALATION) EVERY 4 HOURS PRN
COMMUNITY
Start: 2024-10-28

## 2024-12-11 RX ORDER — MULTIVIT-MIN/IRON FUM/FOLIC AC 7.5 MG-4
1 TABLET ORAL EVERY MORNING
COMMUNITY

## 2024-12-11 RX ORDER — CETIRIZINE HYDROCHLORIDE 10 MG/1
10 TABLET ORAL DAILY
Status: DISCONTINUED | OUTPATIENT
Start: 2024-12-12 | End: 2024-12-13 | Stop reason: HOSPADM

## 2024-12-11 RX ORDER — NAPROXEN SODIUM 220 MG/1
324 TABLET, FILM COATED ORAL ONCE
Status: COMPLETED | OUTPATIENT
Start: 2024-12-11 | End: 2024-12-11

## 2024-12-11 SDOH — SOCIAL STABILITY: SOCIAL INSECURITY: DOES ANYONE TRY TO KEEP YOU FROM HAVING/CONTACTING OTHER FRIENDS OR DOING THINGS OUTSIDE YOUR HOME?: NO

## 2024-12-11 SDOH — HEALTH STABILITY: MENTAL HEALTH: HOW MANY DRINKS CONTAINING ALCOHOL DO YOU HAVE ON A TYPICAL DAY WHEN YOU ARE DRINKING?: PATIENT DOES NOT DRINK

## 2024-12-11 SDOH — HEALTH STABILITY: MENTAL HEALTH: HOW OFTEN DO YOU HAVE A DRINK CONTAINING ALCOHOL?: NEVER

## 2024-12-11 SDOH — ECONOMIC STABILITY: HOUSING INSECURITY: IN THE LAST 12 MONTHS, WAS THERE A TIME WHEN YOU WERE NOT ABLE TO PAY THE MORTGAGE OR RENT ON TIME?: NO

## 2024-12-11 SDOH — SOCIAL STABILITY: SOCIAL INSECURITY: WITHIN THE LAST YEAR, HAVE YOU BEEN HUMILIATED OR EMOTIONALLY ABUSED IN OTHER WAYS BY YOUR PARTNER OR EX-PARTNER?: NO

## 2024-12-11 SDOH — SOCIAL STABILITY: SOCIAL INSECURITY: WITHIN THE LAST YEAR, HAVE YOU BEEN AFRAID OF YOUR PARTNER OR EX-PARTNER?: NO

## 2024-12-11 SDOH — SOCIAL STABILITY: SOCIAL INSECURITY: DO YOU FEEL UNSAFE GOING BACK TO THE PLACE WHERE YOU ARE LIVING?: NO

## 2024-12-11 SDOH — SOCIAL STABILITY: SOCIAL INSECURITY: WERE YOU ABLE TO COMPLETE ALL THE BEHAVIORAL HEALTH SCREENINGS?: YES

## 2024-12-11 SDOH — SOCIAL STABILITY: SOCIAL INSECURITY: DO YOU FEEL ANYONE HAS EXPLOITED OR TAKEN ADVANTAGE OF YOU FINANCIALLY OR OF YOUR PERSONAL PROPERTY?: NO

## 2024-12-11 SDOH — SOCIAL STABILITY: SOCIAL INSECURITY
WITHIN THE LAST YEAR, HAVE YOU BEEN KICKED, HIT, SLAPPED, OR OTHERWISE PHYSICALLY HURT BY YOUR PARTNER OR EX-PARTNER?: NO

## 2024-12-11 SDOH — SOCIAL STABILITY: SOCIAL INSECURITY: HAVE YOU HAD THOUGHTS OF HARMING ANYONE ELSE?: NO

## 2024-12-11 SDOH — SOCIAL STABILITY: SOCIAL INSECURITY
WITHIN THE LAST YEAR, HAVE YOU BEEN RAPED OR FORCED TO HAVE ANY KIND OF SEXUAL ACTIVITY BY YOUR PARTNER OR EX-PARTNER?: NO

## 2024-12-11 SDOH — ECONOMIC STABILITY: FOOD INSECURITY: WITHIN THE PAST 12 MONTHS, YOU WORRIED THAT YOUR FOOD WOULD RUN OUT BEFORE YOU GOT THE MONEY TO BUY MORE.: NEVER TRUE

## 2024-12-11 SDOH — ECONOMIC STABILITY: FOOD INSECURITY: HOW HARD IS IT FOR YOU TO PAY FOR THE VERY BASICS LIKE FOOD, HOUSING, MEDICAL CARE, AND HEATING?: NOT HARD AT ALL

## 2024-12-11 SDOH — ECONOMIC STABILITY: FOOD INSECURITY: WITHIN THE PAST 12 MONTHS, THE FOOD YOU BOUGHT JUST DIDN'T LAST AND YOU DIDN'T HAVE MONEY TO GET MORE.: NEVER TRUE

## 2024-12-11 SDOH — SOCIAL STABILITY: SOCIAL INSECURITY: HAVE YOU HAD ANY THOUGHTS OF HARMING ANYONE ELSE?: NO

## 2024-12-11 SDOH — ECONOMIC STABILITY: HOUSING INSECURITY: IN THE PAST 12 MONTHS, HOW MANY TIMES HAVE YOU MOVED WHERE YOU WERE LIVING?: 1

## 2024-12-11 SDOH — SOCIAL STABILITY: SOCIAL INSECURITY: HAS ANYONE EVER THREATENED TO HURT YOUR FAMILY OR YOUR PETS?: NO

## 2024-12-11 SDOH — ECONOMIC STABILITY: HOUSING INSECURITY: AT ANY TIME IN THE PAST 12 MONTHS, WERE YOU HOMELESS OR LIVING IN A SHELTER (INCLUDING NOW)?: NO

## 2024-12-11 SDOH — SOCIAL STABILITY: SOCIAL INSECURITY: ABUSE: ADULT

## 2024-12-11 SDOH — HEALTH STABILITY: MENTAL HEALTH: HOW OFTEN DO YOU HAVE SIX OR MORE DRINKS ON ONE OCCASION?: NEVER

## 2024-12-11 SDOH — SOCIAL STABILITY: SOCIAL INSECURITY: ARE YOU OR HAVE YOU BEEN THREATENED OR ABUSED PHYSICALLY, EMOTIONALLY, OR SEXUALLY BY ANYONE?: NO

## 2024-12-11 SDOH — ECONOMIC STABILITY: TRANSPORTATION INSECURITY: IN THE PAST 12 MONTHS, HAS LACK OF TRANSPORTATION KEPT YOU FROM MEDICAL APPOINTMENTS OR FROM GETTING MEDICATIONS?: NO

## 2024-12-11 SDOH — ECONOMIC STABILITY: INCOME INSECURITY: IN THE PAST 12 MONTHS HAS THE ELECTRIC, GAS, OIL, OR WATER COMPANY THREATENED TO SHUT OFF SERVICES IN YOUR HOME?: NO

## 2024-12-11 SDOH — SOCIAL STABILITY: SOCIAL INSECURITY: ARE THERE ANY APPARENT SIGNS OF INJURIES/BEHAVIORS THAT COULD BE RELATED TO ABUSE/NEGLECT?: NO

## 2024-12-11 ASSESSMENT — PAIN SCALES - GENERAL
PAINLEVEL_OUTOF10: 0 - NO PAIN

## 2024-12-11 ASSESSMENT — PAIN - FUNCTIONAL ASSESSMENT
PAIN_FUNCTIONAL_ASSESSMENT: 0-10

## 2024-12-11 ASSESSMENT — LIFESTYLE VARIABLES
HOW MANY STANDARD DRINKS CONTAINING ALCOHOL DO YOU HAVE ON A TYPICAL DAY: PATIENT DOES NOT DRINK
HOW OFTEN DO YOU HAVE A DRINK CONTAINING ALCOHOL: NEVER
HOW OFTEN DO YOU HAVE 6 OR MORE DRINKS ON ONE OCCASION: NEVER
PRESCIPTION_ABUSE_PAST_12_MONTHS: NO
SKIP TO QUESTIONS 9-10: 1
SUBSTANCE_ABUSE_PAST_12_MONTHS: NO
AUDIT-C TOTAL SCORE: 0
AUDIT-C TOTAL SCORE: 0
SKIP TO QUESTIONS 9-10: 1
AUDIT-C TOTAL SCORE: 0

## 2024-12-11 ASSESSMENT — ACTIVITIES OF DAILY LIVING (ADL)
WALKS IN HOME: INDEPENDENT
BATHING: INDEPENDENT
HEARING - RIGHT EAR: FUNCTIONAL
ADEQUATE_TO_COMPLETE_ADL: YES
PATIENT'S MEMORY ADEQUATE TO SAFELY COMPLETE DAILY ACTIVITIES?: YES
GROOMING: INDEPENDENT
JUDGMENT_ADEQUATE_SAFELY_COMPLETE_DAILY_ACTIVITIES: YES
TOILETING: INDEPENDENT
LACK_OF_TRANSPORTATION: NO
DRESSING YOURSELF: INDEPENDENT
FEEDING YOURSELF: INDEPENDENT
HEARING - LEFT EAR: FUNCTIONAL
ADEQUATE_TO_COMPLETE_ADL: YES
JUDGMENT_ADEQUATE_SAFELY_COMPLETE_DAILY_ACTIVITIES: YES

## 2024-12-11 ASSESSMENT — COGNITIVE AND FUNCTIONAL STATUS - GENERAL
TURNING FROM BACK TO SIDE WHILE IN FLAT BAD: A LITTLE
WALKING IN HOSPITAL ROOM: A LITTLE
DAILY ACTIVITIY SCORE: 24
PATIENT BASELINE BEDBOUND: NO
MOBILITY SCORE: 18
STANDING UP FROM CHAIR USING ARMS: A LITTLE
CLIMB 3 TO 5 STEPS WITH RAILING: A LOT
MOVING TO AND FROM BED TO CHAIR: A LITTLE

## 2024-12-11 ASSESSMENT — COLUMBIA-SUICIDE SEVERITY RATING SCALE - C-SSRS
1. IN THE PAST MONTH, HAVE YOU WISHED YOU WERE DEAD OR WISHED YOU COULD GO TO SLEEP AND NOT WAKE UP?: NO
6. HAVE YOU EVER DONE ANYTHING, STARTED TO DO ANYTHING, OR PREPARED TO DO ANYTHING TO END YOUR LIFE?: NO
2. HAVE YOU ACTUALLY HAD ANY THOUGHTS OF KILLING YOURSELF?: NO

## 2024-12-11 ASSESSMENT — PATIENT HEALTH QUESTIONNAIRE - PHQ9
1. LITTLE INTEREST OR PLEASURE IN DOING THINGS: NOT AT ALL
SUM OF ALL RESPONSES TO PHQ9 QUESTIONS 1 & 2: 0
2. FEELING DOWN, DEPRESSED OR HOPELESS: NOT AT ALL

## 2024-12-11 NOTE — H&P
History Of Present Illness  Nicki Paulson is a 79 y.o. female with past medical history of hypertension, hyperlipidemia, diabetes mellitus, atrial fibrillation and valvular heart disease (No Ac, aspirin only), Graves' disease, hypothyroidism, Sjogren's, osteoporosis, multiple spinal surgeries w/ most recent 3/2024 , appendectomy, JONATHAN, anxiety, and ischemic colitis who presented today with palpitations.  Patient reports she occasionally gets palpitations however they last no longer than 15 minutes.  She today, she reports she started getting palpitations that lasted for 2 to 3 hours so she decided to come to the emergency room for evaluation.  She admits to associated lightheadedness, dizziness, and shortness of breath.  She denies any associated nausea or diaphoresis.  She denies any recent illness.  She continues that she sees Dr. Sanford for her thyroid and on December 2 she was noted to be hyperthyroid so her Synthroid was stopped.  She continues that she is not on anticoagulation due to history of GI/rectal bleeding and she was to follow-up with Dr. Roman in April to discuss other medication options.    In the ED lab work, EKG, and chest x-ray were performed.  Labs revealed troponins 47 and 44 respectively, free thyroxine 1.46, TSH less than 0.01 (these numbers are unchanged from labs taken on December 2, 2024). EKG on arrival reveals atrial fibrillation with a rate of 140.  This was interpreted by the ED physician. Chest x-ray was negative for acute process.  She arrived with a heart rate of 140 and a low temp of 35.8, her vitals were otherwise stable.  She was given metoprolol and Cardizem and admitted for further evaluation and treatment under the care of Dr. Dexter.  Case discussed with Dr. Sanford and methimazole started.    Past Medical History: As above  Past Surgical history: Saphenous vein ablation, Knee replacement, Lumbar laminectomy, b/l foraminotomy 2-4; T spine T12 laminectomy, cervical fusion,  tonsillectomy, total abdominal hysterectomy, appendectomy, and bladder suspension surgery.  Social history: Non-smoker, no illicit drug use, alcohol abuse.  Family history: Noncontributory  10 point ROS performed and is negative besides what is stated in HPI.     Past Medical History  Past Medical History:   Diagnosis Date    Disease of thyroid gland     Other chest pain     Chest pressure    Personal history of other endocrine, nutritional and metabolic disease     History of type 2 diabetes mellitus    Personal history of other specified conditions     History of palpitations    Varicose veins of unspecified lower extremity with other complications     Varicose veins of leg with complications       Surgical History  Past Surgical History:   Procedure Laterality Date    BLADDER SURGERY  07/05/2018    Bladder Surgery        Social History  She reports that she has never smoked. She has never used smokeless tobacco. She reports that she does not drink alcohol and does not use drugs.  Lives with     Family History  Daughter: Lupus     Allergies  Atorvastatin, Bactrim [sulfamethoxazole-trimethoprim], Ciprofloxacin, Doxycycline, and Cyclosporine    Physical Exam  Constitutional:       Appearance: Normal appearance.   HENT:      Head: Normocephalic and atraumatic.      Nose: Nose normal.      Mouth/Throat:      Mouth: Mucous membranes are moist.   Eyes:      Conjunctiva/sclera: Conjunctivae normal.   Cardiovascular:      Rate and Rhythm: Normal rate. Rhythm irregular.      Heart sounds: Normal heart sounds.   Pulmonary:      Effort: Pulmonary effort is normal.      Breath sounds: Normal breath sounds.   Abdominal:      General: Bowel sounds are normal.      Palpations: Abdomen is soft.      Tenderness: There is abdominal tenderness.   Musculoskeletal:         General: Normal range of motion.      Cervical back: Neck supple.   Skin:     General: Skin is warm and dry.   Neurological:      Mental Status: She is  "alert. Mental status is at baseline.   Psychiatric:         Mood and Affect: Mood normal.          Last Recorded Vitals  Blood pressure 117/58, pulse 88, temperature 35.8 °C (96.4 °F), temperature source Temporal, resp. rate 20, height 1.549 m (5' 1\"), weight 61.2 kg (135 lb), SpO2 (!) 93%.    Relevant Results    No current facility-administered medications on file prior to encounter.     Current Outpatient Medications on File Prior to Encounter   Medication Sig Dispense Refill    acetaminophen (Tylenol) 325 mg tablet Take 2 tablets (650 mg) by mouth every 4 hours if needed for fever (temp greater than 38.0 C) (greater than or equal to 38 C). 30 tablet 0    albuterol 90 mcg/actuation inhaler Inhale 2 puffs every 4 hours if needed.      aspirin 81 mg EC tablet Take 1 tablet (81 mg) by mouth once daily at bedtime.      calcium carbonate (Tums Extra Strength) 300 mg (750 mg) chewable tablet Chew 300 mg 3 times a day as needed for indigestion or heartburn.      CALCIUM-MAGNESIUM-ZINC ORAL Take 1 tablet by mouth once daily at bedtime.      cholecalciferol (D3-2000) 50 mcg (2,000 unit) capsule Take 1 capsule (50 mcg) by mouth once daily at bedtime.      fexofenadine (Allegra Allergy) 180 mg tablet Take 1 tablet (180 mg) by mouth once daily in the morning.      fluticasone (Flonase) 50 mcg/actuation nasal spray Administer 1 spray into each nostril once daily as needed for allergies.      lisinopril 20 mg tablet Take 1 tablet (20 mg) by mouth once daily.      metFORMIN  mg 24 hr tablet Take 1 tablet (500 mg) by mouth once daily in the evening. Take with meals.      metoprolol tartrate (Lopressor) 50 mg tablet Take 1 tablet (50 mg) by mouth 2 times a day. 180 tablet 3    multivitamin with minerals tablet Take 1 tablet by mouth once daily in the morning.      omeprazole (PriLOSEC) 40 mg DR capsule Take 1 capsule (40 mg) by mouth if needed (as needed).      hydroxychloroquine (Plaquenil) 200 mg tablet Take 1 tablet (200 " mg) by mouth once daily in the morning.      levothyroxine (Synthroid) 50 mcg tablet Take 1 tablet (50 mcg) by mouth 4 times a week. Every Monday/Tuesday/Thursday/Friday (Patient not taking: Reported on 12/11/2024)          Results for orders placed or performed during the hospital encounter of 12/11/24 (from the past 24 hours)   CBC and Auto Differential   Result Value Ref Range    WBC 9.2 4.4 - 11.3 x10*3/uL    nRBC 0.0 0.0 - 0.0 /100 WBCs    RBC 4.52 4.00 - 5.20 x10*6/uL    Hemoglobin 12.7 12.0 - 16.0 g/dL    Hematocrit 39.9 36.0 - 46.0 %    MCV 88 80 - 100 fL    MCH 28.1 26.0 - 34.0 pg    MCHC 31.8 (L) 32.0 - 36.0 g/dL    RDW 13.5 11.5 - 14.5 %    Platelets 278 150 - 450 x10*3/uL    Neutrophils % 62.5 40.0 - 80.0 %    Immature Granulocytes %, Automated 1.3 (H) 0.0 - 0.9 %    Lymphocytes % 29.0 13.0 - 44.0 %    Monocytes % 6.0 2.0 - 10.0 %    Eosinophils % 0.8 0.0 - 6.0 %    Basophils % 0.4 0.0 - 2.0 %    Neutrophils Absolute 5.77 (H) 1.60 - 5.50 x10*3/uL    Immature Granulocytes Absolute, Automated 0.12 0.00 - 0.50 x10*3/uL    Lymphocytes Absolute 2.68 0.80 - 3.00 x10*3/uL    Monocytes Absolute 0.55 0.05 - 0.80 x10*3/uL    Eosinophils Absolute 0.07 0.00 - 0.40 x10*3/uL    Basophils Absolute 0.04 0.00 - 0.10 x10*3/uL   Magnesium   Result Value Ref Range    Magnesium 1.66 1.60 - 2.40 mg/dL   Comprehensive metabolic panel   Result Value Ref Range    Glucose 191 (H) 74 - 99 mg/dL    Sodium 141 136 - 145 mmol/L    Potassium 4.4 3.5 - 5.3 mmol/L    Chloride 107 98 - 107 mmol/L    Bicarbonate 24 21 - 32 mmol/L    Anion Gap 14 10 - 20 mmol/L    Urea Nitrogen 19 6 - 23 mg/dL    Creatinine 0.94 0.50 - 1.05 mg/dL    eGFR 62 >60 mL/min/1.73m*2    Calcium 9.2 8.6 - 10.3 mg/dL    Albumin 4.1 3.4 - 5.0 g/dL    Alkaline Phosphatase 87 33 - 136 U/L    Total Protein 6.9 6.4 - 8.2 g/dL    AST 33 9 - 39 U/L    Bilirubin, Total 0.4 0.0 - 1.2 mg/dL    ALT 23 7 - 45 U/L   TSH with reflex to Free T4 if abnormal   Result Value Ref Range     Thyroid Stimulating Hormone <0.01 (L) 0.44 - 3.98 mIU/L   Troponin I, High Sensitivity, Initial   Result Value Ref Range    Troponin I, High Sensitivity 47 (H) 0 - 13 ng/L   Thyroxine, Free   Result Value Ref Range    Thyroxine, Free 1.46 (H) 0.61 - 1.12 ng/dL   Troponin, High Sensitivity, 1 Hour   Result Value Ref Range    Troponin I, High Sensitivity 44 (H) 0 - 13 ng/L        XR chest 1 view    Result Date: 12/11/2024  Interpreted By:  Berny Martinez, STUDY: XR CHEST 1 VIEW;  12/11/2024 11:18 am   INDICATION: Signs/Symptoms:cp, dizzy.     COMPARISON: 09/22/2024   ACCESSION NUMBER(S): [id number]   ORDERING CLINICIAN: CRISS SANDRA   FINDINGS:         CARDIOMEDIASTINAL SILHOUETTE: Cardiomediastinal silhouette is normal in size and configuration.   LUNGS: Lungs are clear. There is no consolidation or effusion.   ABDOMEN: No remarkable upper abdominal findings.   BONES: No acute osseous changes.       1.  No evidence of acute cardiopulmonary process.       MACRO: None   Signed by: Berny Martinez 12/11/2024 11:21 AM Dictation workstation:   [id number]      Assessment/Plan   Assessment & Plan  Atrial fibrillation with RVR (Multi)      Hyperthyroidism  Elevated troponin     admit to telemetry  Inpatient  Appreciate cardiology and endocrine recommendations  Continue to hold home Synthroid  Start methimazole  Echo  A.m. CBC and BMP  Add T3  Continue Cardizem drip  N.p.o. after midnight  Given history of rectal bleeding, defer need for anticoagulation to cardiology      Chronic conditions: Hypertension, hyperlipidemia, diabetes, A-fib, Graves' disease, so Graves' disease, anxiety    Continue home medications as listed above    DVT prophylaxis    SCDs  Lovenox    Patient fully evaluated and plan as above  Yaneth Tinajero, APRN-CNP

## 2024-12-11 NOTE — ED PROVIDER NOTES
HPI   Chief Complaint   Patient presents with    Palpitations     PT. C/O PALPITATIONS OFF AND ON FOR PAST 3-4 DAYS WITH SOME HEAVINESS TO CHEST AND SOME SOB. PT. STATES WAS TOLD TO STOP THYROID MEDICATION 2-3 WEEKS AGO DUE TO HIGH LEVELS. PT. STATES HX A-FIB IN PAST AFTER A SURGERY.       This is a 79-year-old female with a history of hypertension, type 2 diabetes, hypothyroidism and paroxysmal atrial fibrillation who presents with palpitations.  She reports for the past couple days she has had short lasting episodes of palpitations however today it has been persistent.  Her heart has been racing and fluttering.  She has had intermittent chest heaviness, shortness of breath and lightheadedness.  She denies having a headache, vision changes, weakness or paresthesias in her extremities.  She is currently on Lopressor but is not anticoagulated due to a history of a GI bleed.  She also reports seeing her endocrinologist recently and was instructed to lower her Synthroid dose because her TSH level was abnormal.  She does not have any other complaints at this time.              Patient History   Past Medical History:   Diagnosis Date    Disease of thyroid gland     Other chest pain     Chest pressure    Personal history of other endocrine, nutritional and metabolic disease     History of type 2 diabetes mellitus    Personal history of other specified conditions     History of palpitations    Varicose veins of unspecified lower extremity with other complications     Varicose veins of leg with complications     Past Surgical History:   Procedure Laterality Date    BLADDER SURGERY  07/05/2018    Bladder Surgery     No family history on file.  Social History     Tobacco Use    Smoking status: Never    Smokeless tobacco: Never   Vaping Use    Vaping status: Never Used   Substance Use Topics    Alcohol use: Never    Drug use: Never       Physical Exam   ED Triage Vitals [12/11/24 1037]   Temperature Heart Rate Respirations BP    35.8 °C (96.4 °F) (!) 140 16 134/83      Pulse Ox Temp Source Heart Rate Source Patient Position   97 % Temporal Monitor Sitting      BP Location FiO2 (%)     Right arm --       Physical Exam    Appearance: Alert and oriented.  Well-nourished well-developed female sitting in bed in no acute distress.  Irregularly irregular    Head: Normocephalic,  atraumatic.    Eyes: PERRLA, EOMI.    ENT: Moist mucous membranes.    Cardiac: Irregularly irregular.  No murmur.    Pulmonary: Lungs clear bilaterally with good aeration.  No audible wheezing, rales or rhonchi.    Abdomen: Soft, nondistended, nontender with normoactive bowel sounds throughout.    Musculoskeletal: No peripheral edema.  Neurovascular intact throughout.    Neurological: No focal or lateralizing deficit.    Psychiatric: Appropriate mood and affect.       ED Course & MDM   ED Course as of 12/11/24 1349   Wed Dec 11, 2024   1325 Thyroxine, Free(!): 1.46 [SM]      ED Course User Index  [SM] Karen Loza PA-C                 No data recorded     Jean [place] Coma Scale Score: 15 (12/11/24 1039 : Kori Underwood RN)                           Medical Decision Making  This is a 79-year-old female with a history of hypertension, hypothyroidism, type 2 diabetes and paroxysmal atrial fibrillation presents with palpitations.  She has had short lasting episodes of the last 2 days however her symptoms have been persistent today.  She has had associated chest heaviness, shortness of breath and lightheadedness.  She is not currently anticoagulated due to a history of GI bleed.  She presents afebrile and hemodynamically stable with the exception of a heart rate of 140.  On cardiac exam she sounds irregular.  EKG on arrival reveals atrial fibrillation with a rate of 140.  This was interpreted by the ED physician as well as myself.  She is on metoprolol at home therefore she was provided with a dose of IV Lopressor.  Labs were obtained and CBC and CMP were unremarkable  with the exception of glucose of 191.  Magnesium was within normal limits.  2 high-sensitivity troponins in the mid 40s.  TSH less than 0.01 with a T4 level of an elevated T4 level of 1.46.  She remained tachycardic after 1 dose of IV Lopressor therefore she was given a second dose and did not respond.  She was placed on a Cardizem drip at this time.  She has otherwise remained stable in the ED.  Dr. Barakat spoke with Dr. Dexter for admission, and both cardiology and endocrinology were placed on consult.        Procedure  Procedures     Karen Loza PA-C  12/11/24 9299

## 2024-12-11 NOTE — PROGRESS NOTES
Pharmacy Medication History Review    Nicki Paulson is a 79 y.o. female admitted for No Principal Problem: There is no principal problem currently on the Problem List. Please update the Problem List and refresh.. Pharmacy reviewed the patient's [id number] medications and allergies for accuracy.    The list below reflectives the updated PTA list. Please review each medication in order reconciliation for additional clarification and justification.  Prior to Admission medications    Medication Sig Start Date End Date Taking? Authorizing Provider   albuterol 90 mcg/actuation inhaler Inhale 2 puffs every 4 hours if needed. 10/28/24  Yes Historical Provider, MD   calcium carbonate (Tums Extra Strength) 300 mg (750 mg) chewable tablet Chew 300 mg 3 times a day as needed for indigestion or heartburn.   Yes Historical Provider, MD   CALCIUM-MAGNESIUM-ZINC ORAL Take 1 tablet by mouth once daily at bedtime.   Yes Historical Provider, MD   multivitamin with minerals tablet Take 1 tablet by mouth once daily in the morning.   Yes Historical Provider, MD   acetaminophen (Tylenol) 325 mg tablet Take 2 tablets (650 mg) by mouth every 4 hours if needed for fever (temp greater than 38.0 C) (greater than or equal to 38 C). 9/23/24  Yes Tripp Dexter MD   aspirin 81 mg EC tablet Take 1 tablet (81 mg) by mouth once daily at bedtime.   Yes Historical Provider, MD   cholecalciferol (D3-2000) 50 mcg (2,000 unit) capsule Take 1 capsule (50 mcg) by mouth once daily at bedtime.   Yes Historical Provider, MD   fexofenadine (Allegra Allergy) 180 mg tablet Take 1 tablet (180 mg) by mouth once daily in the morning.   Yes Historical Provider, MD   fluticasone (Flonase) 50 mcg/actuation nasal spray Administer 1 spray into each nostril once daily as needed for allergies. 4/22/21  Yes Historical Provider, MD   hydroxychloroquine (Plaquenil) 200 mg tablet Take 1 tablet (200 mg) by mouth once daily in the morning. 6/4/24  no Historical  Provider, MD   levothyroxine (Synthroid) 50 mcg tablet Take 1 tablet (50 mcg) by mouth 4 times a week. Every Monday/Tuesday/Thursday/Friday  Patient not taking: Reported on 12/11/2024 6/27/23  no Historical Provider, MD   lisinopril 20 mg tablet Take 1 tablet (20 mg) by mouth once daily.   Yes Historical Provider, MD   metFORMIN  mg 24 hr tablet Take 1 tablet (500 mg) by mouth once daily in the evening. Take with meals. 9/12/23  Yes Historical Provider, MD   metoprolol tartrate (Lopressor) 50 mg tablet Take 1 tablet (50 mg) by mouth 2 times a day. 10/10/24 10/10/25 Yes William Roman MD   omeprazole (PriLOSEC) 40 mg DR capsule Take 1 capsule (40 mg) by mouth if needed (as needed). 4/22/21  Yes Historical Provider, MD        The list below reflectives the updated allergy list. Please review each documented allergy for additional clarification and justification.  Allergies  Reviewed by Jie Corrigan on 12/11/2024        Severity Reactions Comments    Atorvastatin High Confusion, Blurred vision, Myalgia brain fog    Bactrim [sulfamethoxazole-trimethoprim] High Angioedema Lip Swelling    Ciprofloxacin High Hives, Rash Rash from Cipro that was given for the Diverticular ds. Had seen dermatology at [facility].    Doxycycline High Anaphylaxis Throat closing up  Throat closing up    Cyclosporine Medium Other dryness, eye burning            Below are additional concerns with the patient's PTA list.      Jie Corrigan

## 2024-12-12 ENCOUNTER — APPOINTMENT (OUTPATIENT)
Dept: CARDIOLOGY | Facility: HOSPITAL | Age: 79
End: 2024-12-12
Payer: MEDICARE

## 2024-12-12 LAB
ALBUMIN SERPL BCP-MCNC: 3.5 G/DL (ref 3.4–5)
ALP SERPL-CCNC: 67 U/L (ref 33–136)
ALT SERPL W P-5'-P-CCNC: 19 U/L (ref 7–45)
ANION GAP SERPL CALC-SCNC: 12 MMOL/L (ref 10–20)
AORTIC VALVE MEAN GRADIENT: 4 MMHG
AORTIC VALVE PEAK VELOCITY: 1.43 M/S
APTT PPP: 25 SECONDS (ref 27–38)
AST SERPL W P-5'-P-CCNC: 22 U/L (ref 9–39)
AV PEAK GRADIENT: 8 MMHG
AVA (PEAK VEL): 1.42 CM2
AVA (VTI): 1.71 CM2
BILIRUB SERPL-MCNC: 0.4 MG/DL (ref 0–1.2)
BUN SERPL-MCNC: 22 MG/DL (ref 6–23)
CALCIUM SERPL-MCNC: 8.7 MG/DL (ref 8.6–10.3)
CHLORIDE SERPL-SCNC: 110 MMOL/L (ref 98–107)
CHOLEST SERPL-MCNC: 109 MG/DL (ref 0–199)
CHOLESTEROL/HDL RATIO: 3.5
CO2 SERPL-SCNC: 24 MMOL/L (ref 21–32)
CREAT SERPL-MCNC: 0.97 MG/DL (ref 0.5–1.05)
EGFRCR SERPLBLD CKD-EPI 2021: 60 ML/MIN/1.73M*2
EJECTION FRACTION APICAL 4 CHAMBER: 73.6
EJECTION FRACTION: 63 %
ERYTHROCYTE [DISTWIDTH] IN BLOOD BY AUTOMATED COUNT: 13.9 % (ref 11.5–14.5)
GLUCOSE BLD MANUAL STRIP-MCNC: 110 MG/DL (ref 74–99)
GLUCOSE BLD MANUAL STRIP-MCNC: 113 MG/DL (ref 74–99)
GLUCOSE BLD MANUAL STRIP-MCNC: 116 MG/DL (ref 74–99)
GLUCOSE BLD MANUAL STRIP-MCNC: 154 MG/DL (ref 74–99)
GLUCOSE BLD MANUAL STRIP-MCNC: 171 MG/DL (ref 74–99)
GLUCOSE SERPL-MCNC: 117 MG/DL (ref 74–99)
HCT VFR BLD AUTO: 35.9 % (ref 36–46)
HDLC SERPL-MCNC: 30.9 MG/DL
HGB BLD-MCNC: 11.2 G/DL (ref 12–16)
INR PPP: 1.1 (ref 0.9–1.1)
LDLC SERPL CALC-MCNC: 52 MG/DL
LEFT VENTRICLE INTERNAL DIMENSION DIASTOLE: 4.7 CM (ref 3.5–6)
LEFT VENTRICULAR OUTFLOW TRACT DIAMETER: 1.8 CM
MCH RBC QN AUTO: 27.7 PG (ref 26–34)
MCHC RBC AUTO-ENTMCNC: 31.2 G/DL (ref 32–36)
MCV RBC AUTO: 89 FL (ref 80–100)
MITRAL VALVE E/A RATIO: 1.73
NON HDL CHOLESTEROL: 78 MG/DL (ref 0–149)
NRBC BLD-RTO: 0 /100 WBCS (ref 0–0)
PLATELET # BLD AUTO: 247 X10*3/UL (ref 150–450)
POTASSIUM SERPL-SCNC: 4.4 MMOL/L (ref 3.5–5.3)
PROT SERPL-MCNC: 5.9 G/DL (ref 6.4–8.2)
PROTHROMBIN TIME: 12.6 SECONDS (ref 9.8–12.8)
RBC # BLD AUTO: 4.05 X10*6/UL (ref 4–5.2)
RIGHT VENTRICLE FREE WALL PEAK S': 11.2 CM/S
RIGHT VENTRICLE PEAK SYSTOLIC PRESSURE: 34.8 MMHG
SODIUM SERPL-SCNC: 142 MMOL/L (ref 136–145)
TRIGL SERPL-MCNC: 131 MG/DL (ref 0–149)
VLDL: 26 MG/DL (ref 0–40)
WBC # BLD AUTO: 9.8 X10*3/UL (ref 4.4–11.3)

## 2024-12-12 PROCEDURE — 2500000001 HC RX 250 WO HCPCS SELF ADMINISTERED DRUGS (ALT 637 FOR MEDICARE OP): Performed by: NURSE PRACTITIONER

## 2024-12-12 PROCEDURE — 93306 TTE W/DOPPLER COMPLETE: CPT

## 2024-12-12 PROCEDURE — 36415 COLL VENOUS BLD VENIPUNCTURE: CPT | Performed by: INTERNAL MEDICINE

## 2024-12-12 PROCEDURE — 93005 ELECTROCARDIOGRAM TRACING: CPT

## 2024-12-12 PROCEDURE — 80061 LIPID PANEL: CPT | Performed by: NURSE PRACTITIONER

## 2024-12-12 PROCEDURE — 2060000001 HC INTERMEDIATE ICU ROOM DAILY

## 2024-12-12 PROCEDURE — 82947 ASSAY GLUCOSE BLOOD QUANT: CPT

## 2024-12-12 PROCEDURE — 85730 THROMBOPLASTIN TIME PARTIAL: CPT | Performed by: NURSE PRACTITIONER

## 2024-12-12 PROCEDURE — 99222 1ST HOSP IP/OBS MODERATE 55: CPT

## 2024-12-12 PROCEDURE — 85027 COMPLETE CBC AUTOMATED: CPT | Performed by: NURSE PRACTITIONER

## 2024-12-12 PROCEDURE — 93010 ELECTROCARDIOGRAM REPORT: CPT | Performed by: STUDENT IN AN ORGANIZED HEALTH CARE EDUCATION/TRAINING PROGRAM

## 2024-12-12 PROCEDURE — 80053 COMPREHEN METABOLIC PANEL: CPT | Performed by: INTERNAL MEDICINE

## 2024-12-12 PROCEDURE — 93306 TTE W/DOPPLER COMPLETE: CPT | Performed by: STUDENT IN AN ORGANIZED HEALTH CARE EDUCATION/TRAINING PROGRAM

## 2024-12-12 ASSESSMENT — COGNITIVE AND FUNCTIONAL STATUS - GENERAL
STANDING UP FROM CHAIR USING ARMS: A LITTLE
MOVING TO AND FROM BED TO CHAIR: A LITTLE
TURNING FROM BACK TO SIDE WHILE IN FLAT BAD: A LITTLE
DAILY ACTIVITIY SCORE: 24
WALKING IN HOSPITAL ROOM: A LITTLE
MOBILITY SCORE: 18
CLIMB 3 TO 5 STEPS WITH RAILING: A LOT

## 2024-12-12 ASSESSMENT — PAIN SCALES - GENERAL
PAINLEVEL_OUTOF10: 0 - NO PAIN
PAINLEVEL_OUTOF10: 0 - NO PAIN

## 2024-12-12 NOTE — CONSULTS
Name: Nicki Paulson  MRN: [id number]  Encounter Date: 12/12/2024  PCP: Gerardo Ralph MD  Cardiologist: Dr. William Roman MD    Reason for consult: A-fib RVR  Attending Provider: Dr. Devan Burgess MD    Cardiology Consult Note    History of Present Illness   Nicki Paulson is a 79 y.o. female with a PMHx of paroxysmal A-fib, HTN, moderate mitral regurgitation, moderate tricuspid regurgitation, HTN, HLD, NIDDM, Graves' disease with subsequent hypothyroidism, Sjogren's, GERD, varicose veins, osteoporosis with vitamin D deficiency, sciatica s/p multiple spinal surgeries, diverticulosis and diverticulitis, arthritis on Plaquenil who presented to [facility] on 12/11/2024 with palpitations.    Patient reports she occasionally gets palpitations no longer than 15 minutes per episode.  She reports that yesterday she was prompted to come to the ED after having palpitations lasted for 2 to 3 hours or so.  Associated symptoms include presyncope and dyspnea.    Of note, on 9/20/2024 patient presented to hospital with abdominal pain and bloody stools, developed A-fib with RVR with HR up to 160 bpm.  Treated with IV digoxin, IV Cardizem with conversion back to sinus rhythm.  Patient saw Dr. Roman after discharge, reported home HR checks usually in the 70s bpm.  It was noted that patient cannot tolerate higher dose of metoprolol and was thus started on Lopressor 50 twice daily.  For now, pending eventual colonoscopy, holding AC to determine her risk of bleeding in the future before starting it.    The patient was recently prescribed albuterol, doxycycline for a lower respiratory infection on 10/28/2024.  She was then also prescribed Augmentin 875-125 mg for bronchitis on 11/19/2024.  Then, was prescribed cephalexin 500 mg for cystitis on 12/5/2024.  At that time, she was mentioning symptoms of diaphoresis, abdominal pain, nausea, dysuria, frequency, hematuria.  Further chart review for interval events since her last  hospitalization showing that she was mentioning issues with swallowing and appeared to have dry and puffy eyes.  She was recently seen by her regular endocrinologist Dr. Juvenal MD where she was noted to have a suppressed TSH with concern for recurrence of her Graves' - thyrotoxicosis was diagnosed.  Synthroid was discontinued, methimazole was started.    In the ED, VS were positive for /83,  bpm, SpO2 97 on RA, RR 16.  Labs significant for T41.46, TSH less than 0.01, troponin 47 with repeat 44, INR 1.1, lipid panel WNL.  CXR without evidence of acute cardiopulmonary process.  ECG demonstrated A-fib RVR with  bpm.  Patient was given 2 doses of IV 5 mg Lopressor, put on a Cardizem drip, given 10 mg Cardizem push, loaded with  mg, 5 mg methimazole.  Repeat ECG later in admission (today) shows sinus bradycardia.  Cardiology was consulted for A-fib RVR in the setting of thyrotoxicosis.    Cardiology History    Patient follows Dr. William Roman MD outpatient.  The patient underwent right knee surgery in 2018 at [facility]. Post procedure, she had an episode of atrial fibrillation. She was started on amiodarone and on Eliquis. Ultimately, Dr. Roman stopped these medications and she has not had any recurrence of her atrial arrhythmias until recently. The patient's risk factors for the presence of coronary artery disease include hypertension, diabetes and hyperlipidemia. She is a nonsmoker and has never smoked. There is no definite family history of premature coronary artery disease. The patient underwent a coronary calcium score on 12/28/17. This turned out to be zero indicating the absence of atherosclerotic coronary artery disease.  A cardiac event monitor from 1/2/18 to 1/15/18 demonstrated no evidence of atrial fibrillation. She has a past history of saphenous vein ablation done by Dr. Dukes several years ago.    Of note, on 9/20/2024 patient presented to hospital with  abdominal pain and bloody stools, developed A-fib with RVR with HR up to 160 bpm.  Treated with IV digoxin, IV Cardizem with conversion back to sinus rhythm.  Patient saw Dr. Roman after discharge, reported home HR checks usually in the 70s bpm.  It was noted that patient cannot tolerate higher dose of metoprolol and was thus started on Lopressor 50 twice daily.  For now, pending eventual colonoscopy, holding AC to determine her risk of bleeding in the future before starting it.    Most recent transthoracic echocardiogram 9/6/2023:  CONCLUSIONS:  1. Left ventricular systolic function is normal with a 60-65% estimated ejection fraction.  2. Mildly elevated RVSP.  3. Mild left atrial enlargement.  4. No pericardial effusion.    Past Medical History     Past Medical History:   Diagnosis Date    Disease of thyroid gland     Other chest pain     Chest pressure    Personal history of other endocrine, nutritional and metabolic disease     History of type 2 diabetes mellitus    Personal history of other specified conditions     History of palpitations    Varicose veins of unspecified lower extremity with other complications     Varicose veins of leg with complications     Current Outpatient Medications   Medication Instructions    acetaminophen (TYLENOL) 650 mg, oral, Every 4 hours PRN    albuterol 90 mcg/actuation inhaler 2 puffs, inhalation, Every 4 hours PRN    aspirin 81 mg EC tablet 1 tablet, Nightly    calcium carbonate (TUMS EXTRA STRENGTH) 300 mg, oral, 3 times daily PRN    CALCIUM-MAGNESIUM-ZINC ORAL 1 tablet, oral, Nightly    cholecalciferol (D3-2000) 2,000 Units, Nightly    fexofenadine (Allegra Allergy) 180 mg tablet 1 tablet, Every morning    fluticasone (Flonase) 50 mcg/actuation nasal spray 1 spray, Daily PRN    hydroxychloroquine (PLAQUENIL) 200 mg, oral, Every morning    levothyroxine (SYNTHROID) 50 mcg, 4 times weekly    lisinopril 20 mg tablet 1 tablet, Daily    metFORMIN XR (GLUCOPHAGE-XR) 500 mg, Daily  with evening meal    metoprolol tartrate (LOPRESSOR) 50 mg, oral, 2 times daily    multivitamin with minerals tablet 1 tablet, oral, Every morning    omeprazole (PRILOSEC) 40 mg, As needed     Past Surgical History   Saphenous vein ablation, Knee replacement, Lumbar laminectomy, b/l foraminotomy 2-4; T spine T12 laminectomy, cervical fusion, tonsillectomy, total abdominal hysterectomy, appendectomy, and bladder suspension surgery.     Family History    No family history on file.  Social History     Social History     Socioeconomic History    Marital status:    Tobacco Use    Smoking status: Never    Smokeless tobacco: Never   Vaping Use    Vaping status: Never Used   Substance and Sexual Activity    Alcohol use: Never    Drug use: Never     Social Drivers of Health     Financial Resource Strain: Low Risk  (12/11/2024)    Overall Financial Resource Strain (CARDIA)     Difficulty of Paying Living Expenses: Not hard at all   Food Insecurity: No Food Insecurity (12/11/2024)    Hunger Vital Sign     Worried About Running Out of Food in the Last Year: Never true     Ran Out of Food in the Last Year: Never true   Transportation Needs: No Transportation Needs (12/11/2024)    PRAPARE - Transportation     Lack of Transportation (Medical): No     Lack of Transportation (Non-Medical): No   Physical Activity: Insufficiently Active (2/6/2024)    Received from [employer]    Exercise Vital Sign     Days of Exercise per Week: 3 days     Minutes of Exercise per Session: 30 min   Stress: No Stress Concern Present (2/6/2024)    Received from [employer]    [employer] - Occupational Stress Questionnaire     Feeling of Stress : Only a little   Social Connections: Socially Integrated (2/6/2024)    Received from [employer]    Social Connection and Isolation Panel [NHANES]     Frequency of Communication with Friends and Family: More than three times a week      Frequency of Social Gatherings with Friends and Family: More than three times a week     Attends Zoroastrian Services: More than 4 times per year     Active Member of Clubs or Organizations: Yes     Attends Club or Organization Meetings: More than 4 times per year     Marital Status:    Intimate Partner Violence: Not At Risk (12/11/2024)    Humiliation, Afraid, Rape, and Kick questionnaire     Fear of Current or Ex-Partner: No     Emotionally Abused: No     Physically Abused: No     Sexually Abused: No   Housing Stability: Low Risk  (12/11/2024)    Housing Stability Vital Sign     Unable to Pay for Housing in the Last Year: No     Number of Times Moved in the Last Year: 1     Homeless in the Last Year: No     Allergies     Allergies   Allergen Reactions    Atorvastatin Confusion, Blurred vision and Myalgia     brain fog    Bactrim [Sulfamethoxazole-Trimethoprim] Angioedema     Lip Swelling    Ciprofloxacin Hives and Rash     Rash from Cipro that was given for the Diverticular ds. Had seen dermatology at [facility], [place].    Doxycycline Anaphylaxis     Throat closing up     Throat closing up    Cyclosporine Other     dryness, eye burning     Medications   aspirin, 81 mg, Nightly  cetirizine, 10 mg, Daily  insulin lispro, 0-5 Units, TID AC  lisinopril, 20 mg, Daily  metFORMIN XR, 500 mg, Daily with evening meal  methIMAzole, 5 mg, Daily  metoprolol tartrate, 50 mg, BID  pantoprazole, 40 mg, Daily before breakfast  polyethylene glycol, 17 g, Daily      dilTIAZem, Last Rate: Stopped (12/12/24 0149)      acetaminophen, 650 mg, q4h PRN  albuterol, 2 puff, q4h PRN  fluticasone, 1 spray, Daily PRN  ipratropium-albuteroL, 3 mL, q2h PRN  oxygen, , Continuous PRN - O2/gases      Physical Exam     Vitals:    12/12/24 0202 12/12/24 0406 12/12/24 0616 12/12/24 0742   BP: 119/56 119/55 129/62 119/56   BP Location: Right arm Right arm  Right arm   Patient Position: Lying Lying  Lying   Pulse: 59 64 65 67   Resp: 18 19  18   Temp: 36 °C  (96.8 °F) 36 °C (96.8 °F) 36 °C (96.8 °F) 36.2 °C (97.2 °F)   TempSrc: Temporal Temporal  Temporal   SpO2: 94% 94% 94% 90%   Weight:       Height:         Gen: awake, alert, in no acute distress  HEENT: AT/NC, PEERL, EOMI, no LAD  CV: RRR, no m/r/g  Pulm: CTAB, without w/r/r  Abd: soft, NT/ND, no organomegaly  Ext: no LE edema  Skin: warm and dry  Neuro: A&Ox4, moves all 4 extremities spontaneously     Labs     Results from last 7 days   Lab Units 12/12/24  0543 12/11/24  1107   WBC AUTO x10*3/uL 9.8 9.2   HEMOGLOBIN g/dL 11.2* 12.7   HEMATOCRIT % 35.9* 39.9   PLATELETS AUTO x10*3/uL 247 278     Results from last 7 days   Lab Units 12/12/24  0543 12/11/24  1107   SODIUM mmol/L 142 141   POTASSIUM mmol/L 4.4 4.4   CHLORIDE mmol/L 110* 107   CO2 mmol/L 24 24   BUN mg/dL 22 19   CREATININE mg/dL 0.97 0.94   CALCIUM mg/dL 8.7 9.2   PROTEIN TOTAL g/dL 5.9* 6.9   BILIRUBIN TOTAL mg/dL 0.4 0.4   ALK PHOS U/L 67 87   ALT U/L 19 23   AST U/L 22 33   GLUCOSE mg/dL 117* 191*     Lab Results   Component Value Date    ALT 19 12/12/2024    AST 22 12/12/2024    ALKPHOS 67 12/12/2024    BILITOT 0.4 12/12/2024     Imaging   TTE 12/11/2024  CONCLUSIONS:   1. Left ventricular ejection fraction is normal, by visual estimate at 60-65%.   2. Spectral Doppler shows a Grade I (impaired relaxation pattern) of left ventricular diastolic filling with normal left atrial filling pressure.   3. There is normal right ventricular global systolic function.   4. There is moderate mitral annular calcification.   5. Mild to moderately elevated right ventricular systolic pressure.   6. Aortic valve sclerosis.    XR chest 1 view   Final Result   1.  No evidence of acute cardiopulmonary process.                  MACRO:   None        Signed by: Berny Martinez 12/11/2024 11:21 AM   Dictation workstation:   [id number]      Transthoracic echo (TTE) complete    (Results Pending)     Assessment/Plan   Nicki JARETH Paulson is a 79 y.o. female with a PMHx of  paroxysmal A-fib, HTN, moderate mitral regurgitation, moderate tricuspid regurgitation, HTN, HLD, NIDDM, Graves' disease with subsequent hypothyroidism, Sjogren's, GERD, varicose veins, osteoporosis with vitamin D deficiency, sciatica s/p multiple spinal surgeries, diverticulosis and diverticulitis, arthritis on Plaquenil who [facility] to [facility] on 12/11/[facility]24 with palpitations.    A-fib RVR 2/2 thyrotoxicosis  [id number] Stroke Risk Points: 6   Values used to calculate this score:    Points  Metrics       0        Has Congestive Heart Failure: No       1        Has Hypertension: Yes       2        Age: 79       1        Has Diabetes: Yes       0        Had Stroke: No                 Had TIA: No                 Had Thromboembolism: No       1        Has Vascular Disease: Yes       1        Clinically Relevant Sex: Female    -Continue with methimazole per endocrinology  -Continue Lopressor 50 twice daily to maintain normal sinus rhythm  -For recurrence, may consider a more nonselective beta blocker for rhythm suppression  -Supplemental O2 as needed to maintain SpO2 >94%, currently on RA  -Telemetry ordered for continuous cardiac monitoring  -Trend cell counts, renal function, electrolyte labs  -Echocardiogram ordered to reevaluate structural and functional status  -30-day event monitor on discharge with subsequent OP cardiology follow-up with Dr. Roman  -Has indication for long term AC  -Declined AC - would like to discuss with primary cardiologist       Thank you for this consult, we will continue to follow. Patient seen and discussed with attending physician, Dr. Burgess, who agrees with the above.     Lalo Thayer DO  Internal Medicine Resident, PGY2

## 2024-12-12 NOTE — PROGRESS NOTES
Nicki Paulson is a 79 y.o. female on day 1 of admission presenting with Atrial fibrillation with RVR (Multi).      Subjective   Patient fully evaluated  12/12  for    Problem List Items Addressed This Visit       Hypothyroidism    * (Principal) Atrial fibrillation with RVR (Multi) - Primary    Relevant Medications    dilTIAZem (Cardizem) 125 mg in dextrose 5% 125 mL (1 mg/mL) infusion (premix)    metoprolol tartrate (Lopressor) tablet 50 mg    Other Relevant Orders    Transthoracic echo (TTE) complete (Completed)    Holter Or Event Cardiac Monitor     Other Visit Diagnoses       Paroxysmal A-fib (Multi)        Relevant Medications    metoprolol tartrate (Lopressor) injection 5 mg (Completed)    metoprolol tartrate (Lopressor) injection 5 mg (Completed)    dilTIAZem (Cardizem) 125 mg in dextrose 5% 125 mL (1 mg/mL) infusion (premix)    dilTIAZem (Cardizem) injection 10 mg (Completed)    metoprolol tartrate (Lopressor) tablet 50 mg    Other Relevant Orders    Transthoracic echo (TTE) complete (Completed)            Vital signs for last 24 hours   Temp:  [35.5 °C (95.9 °F)-36.4 °C (97.5 °F)] 35.5 °C (95.9 °F)  Heart Rate:  [] 68  Resp:  [18-20] 18  BP: (112-184)/(55-74) 144/65    No intake/output data recorded.  Patient still requiring frequent cardiac and SPO2 monitoring. Continue aggressive pulmonary hygiene and oral hygiene. Off loading as tolerated for skin integrity.   Medications and labs reviewed-   Results for orders placed or performed during the hospital encounter of 12/11/24 (from the past 24 hours)   POCT GLUCOSE   Result Value Ref Range    POCT Glucose 117 (H) 74 - 99 mg/dL   POCT GLUCOSE   Result Value Ref Range    POCT Glucose 202 (H) 74 - 99 mg/dL   CBC   Result Value Ref Range    WBC 9.8 4.4 - 11.3 x10*3/uL    nRBC 0.0 0.0 - 0.0 /100 WBCs    RBC 4.05 4.00 - 5.20 x10*6/uL    Hemoglobin 11.2 (L) 12.0 - 16.0 g/dL    Hematocrit 35.9 (L) 36.0 - 46.0 %    MCV 89 80 - 100 fL    MCH 27.7 26.0 - 34.0  pg    MCHC 31.2 (L) 32.0 - 36.0 g/dL    RDW 13.9 11.5 - 14.5 %    Platelets 247 150 - 450 x10*3/uL   Coagulation Screen   Result Value Ref Range    Protime 12.6 9.8 - 12.8 seconds    INR 1.1 0.9 - 1.1    aPTT 25 (L) 27 - 38 seconds   Lipid Panel   Result Value Ref Range    Cholesterol 109 0 - 199 mg/dL    HDL-Cholesterol 30.9 mg/dL    Cholesterol/HDL Ratio 3.5     LDL Calculated 52 <=99 mg/dL    VLDL 26 0 - 40 mg/dL    Triglycerides 131 0 - 149 mg/dL    Non HDL Cholesterol 78 0 - 149 mg/dL   Comprehensive Metabolic Panel   Result Value Ref Range    Glucose 117 (H) 74 - 99 mg/dL    Sodium 142 136 - 145 mmol/L    Potassium 4.4 3.5 - 5.3 mmol/L    Chloride 110 (H) 98 - 107 mmol/L    Bicarbonate 24 21 - 32 mmol/L    Anion Gap 12 10 - 20 mmol/L    Urea Nitrogen 22 6 - 23 mg/dL    Creatinine 0.97 0.50 - 1.05 mg/dL    eGFR 60 (L) >60 mL/min/1.73m*2    Calcium 8.7 8.6 - 10.3 mg/dL    Albumin 3.5 3.4 - 5.0 g/dL    Alkaline Phosphatase 67 33 - 136 U/L    Total Protein 5.9 (L) 6.4 - 8.2 g/dL    AST 22 9 - 39 U/L    Bilirubin, Total 0.4 0.0 - 1.2 mg/dL    ALT 19 7 - 45 U/L   POCT GLUCOSE   Result Value Ref Range    POCT Glucose 116 (H) 74 - 99 mg/dL   Transthoracic echo (TTE) complete   Result Value Ref Range    AV pk albino 1.43 m/s    AV mn grad 4 mmHg    LVOT diam 1.80 cm    MV E/A ratio 1.73     LV EF 63 %    RV free wall pk S' 11.20 cm/s    LVIDd 4.70 cm    RVSP 34.8 mmHg    Aortic Valve Area by Continuity of VTI 1.71 cm2    Aortic Valve Area by Continuity of Peak Velocity 1.42 cm2    AV pk grad 8 mmHg    LV A4C EF 73.6    POCT GLUCOSE   Result Value Ref Range    POCT Glucose 154 (H) 74 - 99 mg/dL   POCT GLUCOSE   Result Value Ref Range    POCT Glucose 113 (H) 74 - 99 mg/dL      Patient recently received an antibiotic (last 12 hours)       None        Patient seen resting in bed with head of bed elevated, no s/s or c/o acute difficulties at this time. Patient on telemetry shows afib.   Plan discussed with interdisciplinary  team, awaiting endocrine input and seen by cardiology today- Continue with methimazole per endocrinology  -Continue Lopressor 50 twice daily to maintain normal sinus rhythm  -For recurrence, may consider a more nonselective beta blocker for rhythm suppression  -Supplemental O2 as needed to maintain SpO2 >94%, currently on RA  -Telemetry ordered for continuous cardiac monitoring  -Trend cell counts, renal function, electrolyte labs  -Echocardiogram ordered to reevaluate structural and functional status  -30-day event monitor on discharge with subsequent OP cardiology follow-up with Dr. Roman  -Has indication for long term AC  -Declined AC - would like to discuss with primary cardiologist.  Appreciate input and agree with plan. Will continue current and repeat labs in the AM.    Per [facility] - PCP- Dr NETTA Ralph  Ph[facility]- [facility]  Able to obtain and afford medications. Takes as prescribed.  Stated she has questions about 2 new medications-- informed her will ask RN to follow up regarding those. Asked RN- she will follow up.   Pt is from home, lives with . Stated she is independent, no use of any assistive devices.   Performs own ADL's.  Still drives. Able to get to follow up appointments.  Does not feel she will have any needs upon discharge. Discharge planning discussed with patient and care team. Therapy evaluations ordered. Anticipate return to home at discharge. Patient aware and agreeable to current plan, continue plan as above.     I spent a total of 50 minutes on the date of the service which included preparing to see the patient, face-to-face patient care, completing clinical documentation, obtaining and/or reviewing separately obtained history, performing a medically appropriate examination, counseling and educating the patient/family/caregiver, ordering medications, tests, or procedures, communicating with other HCPs (not separately reported), independently interpreting  results (not separately reported), communicating results to the patient/family/caregiver, and care coordination (not separately reported).        Objective     Last Recorded Vitals  /65   Pulse 68   Temp 35.5 °C (95.9 °F)   Resp 18   Wt 61.2 kg (135 lb)   SpO2 93%   Intake/Output last 3 Shifts:    Intake/Output Summary (Last 24 hours) at 12/12/2024 1546  Last data filed at 12/12/2024 0338  Gross per 24 hour   Intake 92.67 ml   Output --   Net 92.67 ml       Admission Weight  Weight: 61.2 kg (135 lb) (12/11/24 1037)    Daily Weight  12/11/24 : 61.2 kg (135 lb)    Image Results  Transthoracic echo (TTE) complete     [facility], [address] [place] [address]            Tel [phone] and Fax [phone]    TRANSTHORACIC ECHOCARDIOGRAM REPORT       Patient Name:       KELLYREVA BERNAL    Reading Physician:    [id number] David Ladd MD  Study Date:         12/12/2024          Ordering Provider:    [id number] UGO MARCELO  MRN/PID:            [id number]            Fellow:  Accession#:         [id number]        Nurse:  Date of Birth/Age:  1945 / 79 years Sonographer:          Gerardo BARTHOLOMEW, ADRIAN, ALLISON  Gender assigned at  F                   Additional Staff:  Birth:  Height:             154.94 cm           Admit Date:           12/11/2024  Weight:             61.24 kg            Admission Status:     Inpatient -                                                                Routine  BSA / BMI:          1.60 m2 / 25.51     Encounter#:           [id number]                      kg/m2  Blood Pressure:     119/56 mmHg         Department Location:  [facility]    Study Type:    TRANSTHORACIC ECHO (TTE) COMPLETE  Diagnosis/ICD: Paroxysmal atrial fibrillation-I48.0  Indication:    Abnormal EKG  CPT  Code:      Echo Complete w Full Doppler-20194    Patient History:  Pertinent History: A-Fib, HTN, Hyperlipidemia and Chest Pain. Hx DVT, DM.    Study Detail: The following Echo studies were performed: 2D, M-Mode, Doppler and                color flow. Technically challenging study due to body habitus and                prominent lung artifact.       PHYSICIAN INTERPRETATION:  Left Ventricle: Left ventricular ejection fraction is normal, by visual estimate at 60-65%. There is mild concentric left ventricular hypertrophy. There are no regional left ventricular wall motion abnormalities. The left ventricular cavity size is normal. There is mildly increased septal and mildly increased posterior left ventricular wall thickness. Spectral Doppler shows a Grade I (impaired relaxation pattern) of left ventricular diastolic filling with normal left atrial filling pressure.  Left Atrium: The left atrium is normal in size.  Right Ventricle: The right ventricle is normal in size. There is normal right ventricular global systolic function.  Right Atrium: The right atrium is normal in size.  Aortic Valve: The aortic valve is trileaflet. There is mild aortic valve thickening. There is evidence of mildly elevated transaortic gradients consistent with sclerosis of the aortic valve. The aortic valve dimensionless index is 0.67. There is trace aortic valve regurgitation. The peak instantaneous gradient of the aortic valve is 8 mmHg. The mean gradient of the aortic valve is 4 mmHg.  Mitral Valve: The mitral valve is normal in structure. There is moderate mitral annular calcification. There is mild mitral valve regurgitation.  Tricuspid Valve: The tricuspid valve is structurally normal. There is mild tricuspid regurgitation. The Doppler estimated RVSP is mild to moderately elevated at 34.8 mmHg.  Pulmonic Valve: The pulmonic valve is structurally normal. There is trace pulmonic valve regurgitation.  Pericardium: There is no pericardial  effusion noted.  Aorta: The aortic root is normal.  Systemic Veins: The inferior vena cava appears normal in size, with IVC inspiratory collapse greater than 50%.       CONCLUSIONS:   1. Left ventricular ejection fraction is normal, by visual estimate at 60-65%.   2. Spectral Doppler shows a Grade I (impaired relaxation pattern) of left ventricular diastolic filling with normal left atrial filling pressure.   3. There is normal right ventricular global systolic function.   4. There is moderate mitral annular calcification.   5. Mild to moderately elevated right ventricular systolic pressure.   6. Aortic valve sclerosis.    QUANTITATIVE DATA SUMMARY:     2D MEASUREMENTS:          Normal Ranges:  Ao Root d:       2.70 cm  (2.0-3.7cm)  LAs:             3.50 cm  (2.7-4.0cm)  IVSd:            1.00 cm  (0.6-1.1cm)  LVPWd:           1.00 cm  (0.6-1.1cm)  LVIDd:           4.70 cm  (3.9-5.9cm)  LVIDs:           3.70 cm  LV Mass Index:   103 g/m2  LVEDV Index:     43 ml/m2  LV % FS          21.3 %       LA VOLUME:                   Normal Ranges:  LA Vol A4C:        56.7 ml   (22+/-6mL/m2)  LA Vol Index A4C:  35.4ml/m2  LA Area A4C:       20.0 cm2  LA Area A2C:       18.0 cm2  LA Major Axis A4C: 6.0 cm       RA VOLUME BY A/L METHOD:          Normal Ranges:  RA Area A4C:             13.0 cm2       M-MODE MEASUREMENTS:         Normal Ranges:  Ao Root:             2.80 cm (2.0-3.7cm)  LAs:                 3.60 cm (2.7-4.0cm)       AORTA MEASUREMENTS:         Normal Ranges:  Asc Ao, d:          2.60 cm (2.1-3.4cm)  Ao Arch:            2.40 cm (2.0-3.6cm)       LV SYSTOLIC FUNCTION BY 2D PLANIMETRY (MOD):                       Normal Ranges:  EF-A4C View:    74 % (>=55%)  EF-A2C View:    56 %  EF-Biplane:     65 %  EF-Visual:      63 %  LV EF Reported: 63 %       LV DIASTOLIC FUNCTION:           Normal Ranges:  MV Peak E:             1.13 m/s  (0.7-1.2 m/s)  MV Peak A:             0.66 m/s  (0.42-0.7 m/s)  E/A Ratio:              1.73      (1.0-2.2)  MV e'                  0.093 m/s (>8.0)  MV lateral e'          0.10 m/s  MV medial e'           0.09 m/s  E/e' Ratio:            12.09     (<8.0)       MITRAL VALVE:          Normal Ranges:  MV DT:        183 msec (150-240msec)       AORTIC VALVE:                     Normal Ranges:  AoV Vmax:                1.43 m/s (<=1.7m/s)  AoV Peak P.2 mmHg (<20mmHg)  AoV Mean P.0 mmHg (1.7-11.5mmHg)  LVOT Max Dinesh:            0.80 m/s (<=1.1m/s)  AoV VTI:                 27.30 cm (18-25cm)  LVOT VTI:                18.30 cm  LVOT Diameter:           1.80 cm  (1.8-2.4cm)  AoV Area, VTI:           1.71 cm2 (2.5-5.5cm2)  AoV Area,Vmax:           1.42 cm2 (2.5-4.5cm2)  AoV Dimensionless Index: 0.67       RIGHT VENTRICLE:  RV s' 0.11 m/s       TRICUSPID VALVE/RVSP:          Normal Ranges:  Peak TR Velocity:     2.82 m/s  RV Syst Pressure:     35 mmHg  (< 30mmHg)  IVC Diam:             2.10 cm       PULMONIC VALVE:          Normal Ranges:  PV Max Dinesh:     0.8 m/s  (0.6-0.9m/s)  PV Max P.5 mmHg       [id number] David Ladd MD  Electronically signed on 2024 at 10:24:39 AM       ** Final **      Physical Exam    Relevant Results               Assessment/Plan   This patient currently has cardiac telemetry ordered; if you would like to modify or discontinue the telemetry order, click here to go to the orders activity to modify/discontinue the order.              Assessment & Plan  Atrial fibrillation with RVR (Multi)          Tripp Dexter MD   Physician  Internal Medicine     H&P      Addendum     Date of Service: 2024  3:46 PM     Addendum       Expand All Collapse All    History Of Present Illness  Nicki Paulson is a 79 y.o. female with past medical history of hypertension, hyperlipidemia, diabetes mellitus, atrial fibrillation and valvular heart disease (No Ac, aspirin only), Graves' disease, hypothyroidism, Sjogren's, osteoporosis, multiple spinal  surgeries w/ most recent 3/2024 , appendectomy, JONATHAN, anxiety, and ischemic colitis who presented today with palpitations.  Patient reports she occasionally gets palpitations however they last no longer than 15 minutes.  She today, she reports she started getting palpitations that lasted for 2 to 3 hours so she decided to come to the emergency room for evaluation.  She admits to associated lightheadedness, dizziness, and shortness of breath.  She denies any associated nausea or diaphoresis.  She denies any recent illness.  She continues that she sees Dr. Sanford for her thyroid and on December 2 she was noted to be hyperthyroid so her Synthroid was stopped.  She continues that she is not on anticoagulation due to history of GI/rectal bleeding and she was to follow-up with Dr. Roman in April to discuss other medication options.     In the ED lab work, EKG, and chest x-ray were performed.  Labs revealed troponins 47 and 44 respectively, free thyroxine 1.46, TSH less than 0.01 (these numbers are unchanged from labs taken on December 2, 2024). EKG on arrival reveals atrial fibrillation with a rate of 140.  This was interpreted by the ED physician. Chest x-ray was negative for acute process.  She arrived with a heart rate of 140 and a low temp of 35.8, her vitals were otherwise stable.  She was given metoprolol and Cardizem and admitted for further evaluation and treatment under the care of Dr. Dexter.  Case discussed with Dr. Sanford and methimazole started.     Past Medical History: As above  Past Surgical history: Saphenous vein ablation, Knee replacement, Lumbar laminectomy, b/l foraminotomy 2-4; T spine T12 laminectomy, cervical fusion, tonsillectomy, total abdominal hysterectomy, appendectomy, and bladder suspension surgery.  Social history: Non-smoker, no illicit drug use, alcohol abuse.  Family history: Noncontributory  10 point ROS performed and is negative besides what is stated in HPI.     Past Medical  History  Medical History        Past Medical History:   Diagnosis Date    Disease of thyroid gland      Other chest pain       Chest pressure    Personal history of other endocrine, nutritional and metabolic disease       History of type 2 diabetes mellitus    Personal history of other specified conditions       History of palpitations    Varicose veins of unspecified lower extremity with other complications       Varicose veins of leg with complications            Surgical History  Surgical History         Past Surgical History:   Procedure Laterality Date    BLADDER SURGERY   07/05/2018     Bladder Surgery            Social History  She reports that she has never smoked. She has never used smokeless tobacco. She reports that she does not drink alcohol and does not use drugs.  Lives with      Family History  Daughter: Lupus     Allergies  Atorvastatin, Bactrim [sulfamethoxazole-trimethoprim], Ciprofloxacin, Doxycycline, and Cyclosporine     Physical Exam  Constitutional:       Appearance: Normal appearance.   HENT:      Head: Normocephalic and atraumatic.      Nose: Nose normal.      Mouth/Throat:      Mouth: Mucous membranes are moist.   Eyes:      Conjunctiva/sclera: Conjunctivae normal.   Cardiovascular:      Rate and Rhythm: Normal rate. Rhythm irregular.      Heart sounds: Normal heart sounds.   Pulmonary:      Effort: Pulmonary effort is normal.      Breath sounds: Normal breath sounds.   Abdominal:      General: Bowel sounds are normal.      Palpations: Abdomen is soft.      Tenderness: There is abdominal tenderness.   Musculoskeletal:         General: Normal range of motion.      Cervical back: Neck supple.   Skin:     General: Skin is warm and dry.   Neurological:      Mental Status: She is alert. Mental status is at baseline.   Psychiatric:         Mood and Affect: Mood normal.            Last Recorded Vitals  Blood pressure 117/58, pulse 88, temperature 35.8 °C (96.4 °F), temperature source  "Temporal, resp. rate 20, height 1.549 m (5' 1\"), weight 61.2 kg (135 lb), SpO2 (!) 93%.     Relevant Results     Medications Ordered Prior to Encounter   No current facility-administered medications on file prior to encounter.             Current Outpatient Medications on File Prior to Encounter   Medication Sig Dispense Refill    acetaminophen (Tylenol) 325 mg tablet Take 2 tablets (650 mg) by mouth every 4 hours if needed for fever (temp greater than 38.0 C) (greater than or equal to 38 C). 30 tablet 0    albuterol 90 mcg/actuation inhaler Inhale 2 puffs every 4 hours if needed.        aspirin 81 mg EC tablet Take 1 tablet (81 mg) by mouth once daily at bedtime.        calcium carbonate (Tums Extra Strength) 300 mg (750 mg) chewable tablet Chew 300 mg 3 times a day as needed for indigestion or heartburn.        CALCIUM-MAGNESIUM-ZINC ORAL Take 1 tablet by mouth once daily at bedtime.        cholecalciferol (D3-2000) 50 mcg (2,000 unit) capsule Take 1 capsule (50 mcg) by mouth once daily at bedtime.        fexofenadine (Allegra Allergy) 180 mg tablet Take 1 tablet (180 mg) by mouth once daily in the morning.        fluticasone (Flonase) 50 mcg/actuation nasal spray Administer 1 spray into each nostril once daily as needed for allergies.        lisinopril 20 mg tablet Take 1 tablet (20 mg) by mouth once daily.        metFORMIN  mg 24 hr tablet Take 1 tablet (500 mg) by mouth once daily in the evening. Take with meals.        metoprolol tartrate (Lopressor) 50 mg tablet Take 1 tablet (50 mg) by mouth 2 times a day. 180 tablet 3    multivitamin with minerals tablet Take 1 tablet by mouth once daily in the morning.        omeprazole (PriLOSEC) 40 mg DR capsule Take 1 capsule (40 mg) by mouth if needed (as needed).        hydroxychloroquine (Plaquenil) 200 mg tablet Take 1 tablet (200 mg) by mouth once daily in the morning.        levothyroxine (Synthroid) 50 mcg tablet Take 1 tablet (50 mcg) by mouth 4 times a " week. Every Monday/Tuesday/Thursday/Friday (Patient not taking: Reported on 12/11/2024)                      Results for orders placed or performed during the hospital encounter of 12/11/24 (from the past 24 hours)   CBC and Auto Differential   Result Value Ref Range     WBC 9.2 4.4 - 11.3 x10*3/uL     nRBC 0.0 0.0 - 0.0 /100 WBCs     RBC 4.52 4.00 - 5.20 x10*6/uL     Hemoglobin 12.7 12.0 - 16.0 g/dL     Hematocrit 39.9 36.0 - 46.0 %     MCV 88 80 - 100 fL     MCH 28.1 26.0 - 34.0 pg     MCHC 31.8 (L) 32.0 - 36.0 g/dL     RDW 13.5 11.5 - 14.5 %     Platelets 278 150 - 450 x10*3/uL     Neutrophils % 62.5 40.0 - 80.0 %     Immature Granulocytes %, Automated 1.3 (H) 0.0 - 0.9 %     Lymphocytes % 29.0 13.0 - 44.0 %     Monocytes % 6.0 2.0 - 10.0 %     Eosinophils % 0.8 0.0 - 6.0 %     Basophils % 0.4 0.0 - 2.0 %     Neutrophils Absolute 5.77 (H) 1.60 - 5.50 x10*3/uL     Immature Granulocytes Absolute, Automated 0.12 0.00 - 0.50 x10*3/uL     Lymphocytes Absolute 2.68 0.80 - 3.00 x10*3/uL     Monocytes Absolute 0.55 0.05 - 0.80 x10*3/uL     Eosinophils Absolute 0.07 0.00 - 0.40 x10*3/uL     Basophils Absolute 0.04 0.00 - 0.10 x10*3/uL   Magnesium   Result Value Ref Range     Magnesium 1.66 1.60 - 2.40 mg/dL   Comprehensive metabolic panel   Result Value Ref Range     Glucose 191 (H) 74 - 99 mg/dL     Sodium 141 136 - 145 mmol/L     Potassium 4.4 3.5 - 5.3 mmol/L     Chloride 107 98 - 107 mmol/L     Bicarbonate 24 21 - 32 mmol/L     Anion Gap 14 10 - 20 mmol/L     Urea Nitrogen 19 6 - 23 mg/dL     Creatinine 0.94 0.50 - 1.05 mg/dL     eGFR 62 >60 mL/min/1.73m*2     Calcium 9.2 8.6 - 10.3 mg/dL     Albumin 4.1 3.4 - 5.0 g/dL     Alkaline Phosphatase 87 33 - 136 U/L     Total Protein 6.9 6.4 - 8.2 g/dL     AST 33 9 - 39 U/L     Bilirubin, Total 0.4 0.0 - 1.2 mg/dL     ALT 23 7 - 45 U/L   TSH with reflex to Free T4 if abnormal   Result Value Ref Range     Thyroid Stimulating Hormone <0.01 (L) 0.44 - 3.98 mIU/L   Troponin I,  High Sensitivity, Initial   Result Value Ref Range     Troponin I, High Sensitivity 47 (H) 0 - 13 ng/L   Thyroxine, Free   Result Value Ref Range     Thyroxine, Free 1.46 (H) 0.61 - 1.12 ng/dL   Troponin, High Sensitivity, 1 Hour   Result Value Ref Range     Troponin I, High Sensitivity 44 (H) 0 - 13 ng/L         XR chest 1 view     Result Date: 12/11/2024  Interpreted By:  Berny Martinez, STUDY: XR CHEST 1 VIEW;  12/11/2024 11:18 am   INDICATION: Signs/Symptoms:cp, dizzy.     COMPARISON: 09/22/2024   ACCESSION NUMBER(S): [id number]   ORDERING CLINICIAN: CRISS SANDRA   FINDINGS:         CARDIOMEDIASTINAL SILHOUETTE: Cardiomediastinal silhouette is normal in size and configuration.   LUNGS: Lungs are clear. There is no consolidation or effusion.   ABDOMEN: No remarkable upper abdominal findings.   BONES: No acute osseous changes.        1.  No evidence of acute cardiopulmonary process.       MACRO: None   Signed by: Berny Martinez 12/11/2024 11:21 AM Dictation workstation:   [id number]          Assessment/Plan     Assessment & Plan  Atrial fibrillation with RVR (Multi)        Hyperthyroidism  Elevated troponin      admit to telemetry  Inpatient  Appreciate cardiology and endocrine recommendations  Continue to hold home Synthroid  Start methimazole  Echo  A.m. CBC and BMP  Add T3  Continue Cardizem drip  N.p.o. after midnight  Given history of rectal bleeding, defer need for anticoagulation to cardiology        Chronic conditions: Hypertension, hyperlipidemia, diabetes, A-fib, Graves' disease, so Graves' disease, anxiety     Continue home medications as listed above     DVT prophylaxis     SCDs  Lovenox     Patient fully evaluated and plan as above  Patient fully evaluated, repeat labs in AM and continue plan as above.  I spent 75 minutes in the professional and overall care of this patient.                 Revision History                   Lorrie Chong

## 2024-12-12 NOTE — CONSULTS
Endocrinology Initial Inpatient Consult Note     PATIENT NAME: Nicki Paulson  MRN: [id number]  DATE: 12/12/2024    CONSULTING PHYSICIAN: Dr WING Dexter  REASON FOR CONSULT: Graves Disease      History of Presenting Illness     79y F w. PMHx of:      # Uncontrolled T2DM      # Graves' Disease      # Atrial Fibrillation      # Osteopenia      # Hypertension      # Dyslipidemia      # Vitamin D Deficiency    Patient presented to [facility] on 12/11 complaining of palpitations.  She states she has not felt right for the past couple weeks.  She is been having on and off palpitations for the last 3 days.  She states that they were not any better since stopping her levothyroxine.  She also states that her palpitations are associated with dyspnea.  She is been off of levothyroxine for 2 to 3 weeks since she called me due to her abnormal thyroid function testing which I saw her in the clinic for.  The patient states that she is on a beta-blocker.    In the ER, CBC was unremarkable.  Magnesium level was normal.  CMP showed elevated glucose 191 mg/dL.  Troponin is found to be 44.  TSH was suppressed to less than 0.01.  Free T4 was found be 1.46.  The patient's free T3 level was normal at 4.2.  Patient subsequent admitted.  Admitting nurse practitioner did reach out to me.  I recommend started methimazole 5 mg daily.     In regards to her thyroid, the patient follows with me for her history of Graves' disease and subsequent hypothyroidism.  The patient does have a history of Graves' disease.  Afterwards her TSH was elevated.  She was started on levothyroxine.  The patient was taking levothyroxine 50 mcg for 5-1/2 tablets/week.  The patient was seen by myself in the office on 12/2.  At that point she noted to have a suppressed TSH.  There was concern for recurrence of her Graves'.  The patient had repeat thyroid function testing done on my direction.  Again she showed to have thyrotoxicosis.  I recommended that she stop her  "levothyroxine.  Her TSI and TRAb's were positive.      Review of Systems (Bold if Positive)     General: Fevers, Chills, Weight Loss, or Night Sweats  HEENT: Headaches or Blurry Vision  Cardiovascular: CP, Palpitations, Diaphoresis, or Peripheral Edema  Respiratory: Cough, Shortness of Breath, or Hemoptysis  Gastrointestinal: N/V, Abdominal Pain, Constipation, Diarrhea, Melena, Hematochezia  Genitourinary: Polyruia, Dysuria, Urgency   Endo: Polydipsia, Heat/Cold Intolerance, Hair/Skin/Nail Changes  Rheum: Joint Pain   MSK: LBP, Muscle Pain  Psych: Anxiety, Depression      Physical Examination     /56   Pulse 67   Temp 36.2 °C (97.2 °F)   Resp 18   Ht 1.549 m (5' 1\")   Wt 61.2 kg (135 lb)   SpO2 90%   BMI 25.51 kg/m²   General: No acute distress. A&Ox3.   HEENT: PERRLA. EOMi. Ø Exophthalmos   Neck: No LAD.  Thyroid: 20 g.  No palpable nodularities.  Ø Bruit  CV: Irregular rhythm.. No murmurs or rubs auscultated.   Resp: CTA b/l. No wheezing or crackles.   Abdomen: Soft, nontender, nondistended abdomen. BSx4.   Extremities: No pedal edema b/l.  Neuro: CN II-XII Grossly Intact. Ø Tremor. Brisk Reflexes.   Psych: Appropriate affect  Skin: No apparent rashes      Past Medical / Surgical / Family / Social History     Past Medical History:   Diagnosis Date    Disease of thyroid gland     Other chest pain     Chest pressure    Personal history of other endocrine, nutritional and metabolic disease     History of type 2 diabetes mellitus    Personal history of other specified conditions     History of palpitations    Varicose veins of unspecified lower extremity with other complications     Varicose veins of leg with complications     Past Surgical History:   Procedure Laterality Date    BLADDER SURGERY  07/05/2018    Bladder Surgery     No family history on file.  Social History     Socioeconomic History    Marital status:      Spouse name: Not on file    Number of children: Not on file    Years of " education: Not on file    Highest education level: Not on file   Occupational History    Not on file   Tobacco Use    Smoking status: Never    Smokeless tobacco: Never   Vaping Use    Vaping status: Never Used   Substance and Sexual Activity    Alcohol use: Never    Drug use: Never    Sexual activity: Not on file   Other Topics Concern    Not on file   Social History Narrative    Not on file     Social Drivers of Health     Financial Resource Strain: Low Risk  (12/11/2024)    Overall Financial Resource Strain (CARDIA)     Difficulty of Paying Living Expenses: Not hard at all   Food Insecurity: No Food Insecurity (12/11/2024)    Hunger Vital Sign     Worried About Running Out of Food in the Last Year: Never true     Ran Out of Food in the Last Year: Never true   Transportation Needs: No Transportation Needs (12/11/2024)    PRAPARE - Transportation     Lack of Transportation (Medical): No     Lack of Transportation (Non-Medical): No   Physical Activity: Insufficiently Active (2/6/2024)    Received from [facility]    Exercise Vital Sign     Days of Exercise per Week: 3 days     Minutes of Exercise per Session: 30 min   Stress: No Stress Concern Present (2/6/2024)    Received from [facility]    [employer] [place] [employer] Occupational [employer] - Occupational Stress Questionnaire     Feeling of Stress : Only a little   Social Connections: Socially Integrated (2/6/2024)    Received from [facility]    Social Connection and Isolation Panel [NHANES]     Frequency of Communication with Friends and Family: More than three times a week     Frequency of Social Gatherings with Friends and Family: More than three times a week     Attends Judaism Services: More than 4 times per year     Active Member of Clubs or Organizations: Yes     Attends Club or Organization Meetings: More than 4 times per year     Marital Status:    Intimate Partner Violence: Not At Risk (12/11/2024)    Humiliation,  Afraid, Rape, and Kick questionnaire     Fear of Current or Ex-Partner: No     Emotionally Abused: No     Physically Abused: No     Sexually Abused: No   Housing Stability: Low Risk  (12/11/2024)    Housing Stability Vital Sign     Unable to Pay for Housing in the Last Year: No     Number of Times Moved in the Last Year: 1     Homeless in the Last Year: No       Medications & Allergies     MAR and PTA Meds Reviewed     Allergies   Allergen Reactions    Atorvastatin Confusion, Blurred vision and Myalgia     brain fog    Bactrim [Sulfamethoxazole-Trimethoprim] Angioedema     Lip Swelling    Ciprofloxacin Hives and Rash     Rash from Cipro that was given for the Diverticular ds. Had seen dermatology at [facility].    Doxycycline Anaphylaxis     Throat closing up     Throat closing up    Cyclosporine Other     dryness, eye burning       Data     Recent Labs and Imaging Reviewed      Assessment / Plan       # Uncontrolled Type 2 Diabetes Mellitus  Home Regimen: Metformin  mg qDinner  Managed by her PCP as Outpatient.   - Cont SSI for now    # Thyrotoxicosis: Has a history of Graves' disease in the past.  She was maintained on levothyroxine due to elevated TSH.  It does appear per her biochemistries that she does have a recurrence of her Graves' disease.  This was explained to the patient.  We will start her on methimazole 5 mg daily.  Will need repeat thyroid function testing in 4 to 6 weeks.    # Atrial Fibrillation: Per cardiology.  We will try to achieve euthyroidism as fast as possible so as to not exacerbate this issue.    # Dyslipidemia: The patient is followed by Dr. Roman.  She is reportedly statin intolerant.       I spent a total of 80 minutes on the date of the service which included preparing to see the patient, face-to-face patient care, completing clinical documentation, obtaining and/or reviewing separately obtained history, performing a medically appropriate examination, counseling and educating the  patient/family/caregiver, ordering medications, tests, or procedures, communicating with other HCPs (not separately reported), independently interpreting results (not separately reported), communicating results to the patient/family/caregiver, and care coordination (not separately reported).     Matthew Sanford, DO  Endocrinology, Diabetes, and Metabolism    Available via [employer] Messenger    Please excuse any typographical or unwanted errors within this documentation as voice recognition software was used to dictate this note.

## 2024-12-12 NOTE — CARE PLAN
The patient's goals for the shift include      The clinical goals for the shift include patient will maintain comfort and safety, heart rate will remain stable and she will refrain from further complications    Over the shift, the patient will maintain comfort and safety and refrain from further complications

## 2024-12-12 NOTE — PROGRESS NOTES
12/12/24 1329   Discharge Planning   Living Arrangements Spouse/significant other   Support Systems Spouse/significant other   Assistance Needed none   Type of Residence Private residence   Expected Discharge Disposition Home     12/12/2024  Met with pt in room, introduced self and explained role.  Verified insurance, address, phone.  PCP- Dr NETTA Ralph  Pharmacy- [facility] [place] [facility]       Able to obtain and afford medications.  Takes as prescribed.  Stated she has questions about 2 new medications-- informed her will ask RN to follow up regarding those. Asked RN- she will follow up.   Pt is from home, lives with . Stated she is independent, no use of any assistive devices.   Performs own ADL's.  Still drives. Able to get to follow up appointments.  Does not feel she will have any needs upon discharge.  Ct Team will continue to follow.   Karen Chapin RN [facility]

## 2024-12-12 NOTE — CARE PLAN
Problem: Pain - Adult  Goal: Verbalizes/displays adequate comfort level or baseline comfort level  Outcome: Progressing     Problem: Safety - Adult  Goal: Free from fall injury  Outcome: Progressing     Problem: Chronic Conditions and Co-morbidities  Goal: Patient's chronic conditions and co-morbidity symptoms are monitored and maintained or improved  Outcome: Progressing    The patient's goals for the shift include  feel better    The clinical goals for the shift include Maintain HR between  Afib on cardizem throughout end of shift

## 2024-12-12 NOTE — PROGRESS NOTES
Spiritual Care Visit  Spiritual Care Request    Reason for Visit:  Routine Visit: Introduction  Continue Visiting: No     Request Received From:  Referral From:     Focus of Care:  Visited With: Patient         Refer to :          Spiritual Care Assessment    Spiritual Assessment:                      Care Provided:       Sense of Community and or Mormon Affiliation:  Hindu         Addressed Needs/Concerns and/or [place] Through:     Sacramental Encounters  Communion: Patient wants communion  Communion Given Indicator: No    Outcome:  Outcome of Spiritual Care Visit: Comfort/healing presence     Advance Directives:         Spiritual Care Annotation    Annotation:  ***    [facility]  Notes  ***    Patient: Nicki Paulson    Date: 12/12/2024  Time: 11:34 AM  Total time (min.):***  This visit showed care and concern and offered the opportunity for emotional, spiritual care if needed. I allowed time and space for spiritual support as part of a holistic approach to wellness that addressed the whole person. *** was offered the opportunity for a visit for emotional, spiritual support. I was unable to assess at this time. This was a holistic approach that addressed wellness as whole-body care the allows for the integration of the body, mind and spirit. I followed the policy for PPE. There are no other needs.    I am available upon request.  [facility] Department of Spiritual Care Contact #: [phone]  Signed by: Rev. Heidi Lopez ThM, MA

## 2024-12-13 ENCOUNTER — APPOINTMENT (OUTPATIENT)
Dept: CARDIOLOGY | Facility: HOSPITAL | Age: 79
End: 2024-12-13
Payer: MEDICARE

## 2024-12-13 ENCOUNTER — PHARMACY VISIT (OUTPATIENT)
Dept: PHARMACY | Facility: CLINIC | Age: 79
End: 2024-12-13
Payer: MEDICARE

## 2024-12-13 VITALS
HEART RATE: 69 BPM | SYSTOLIC BLOOD PRESSURE: 155 MMHG | OXYGEN SATURATION: 94 % | RESPIRATION RATE: 16 BRPM | HEIGHT: 61 IN | BODY MASS INDEX: 25.49 KG/M2 | TEMPERATURE: 98.1 F | WEIGHT: 135 LBS | DIASTOLIC BLOOD PRESSURE: 68 MMHG

## 2024-12-13 LAB
ALBUMIN SERPL BCP-MCNC: 3.4 G/DL (ref 3.4–5)
ALP SERPL-CCNC: 69 U/L (ref 33–136)
ALT SERPL W P-5'-P-CCNC: 15 U/L (ref 7–45)
ANION GAP SERPL CALC-SCNC: 12 MMOL/L (ref 10–20)
AST SERPL W P-5'-P-CCNC: 19 U/L (ref 9–39)
ATRIAL RATE: 58 BPM
BASOPHILS # BLD AUTO: 0.04 X10*3/UL (ref 0–0.1)
BASOPHILS NFR BLD AUTO: 0.5 %
BILIRUB SERPL-MCNC: 0.4 MG/DL (ref 0–1.2)
BODY SURFACE AREA: 1.62 M2
BUN SERPL-MCNC: 20 MG/DL (ref 6–23)
CALCIUM SERPL-MCNC: 8.6 MG/DL (ref 8.6–10.3)
CHLORIDE SERPL-SCNC: 109 MMOL/L (ref 98–107)
CO2 SERPL-SCNC: 24 MMOL/L (ref 21–32)
CREAT SERPL-MCNC: 0.81 MG/DL (ref 0.5–1.05)
EGFRCR SERPLBLD CKD-EPI 2021: 74 ML/MIN/1.73M*2
EOSINOPHIL # BLD AUTO: 0.19 X10*3/UL (ref 0–0.4)
EOSINOPHIL NFR BLD AUTO: 2.2 %
ERYTHROCYTE [DISTWIDTH] IN BLOOD BY AUTOMATED COUNT: 13.7 % (ref 11.5–14.5)
GLUCOSE BLD MANUAL STRIP-MCNC: 105 MG/DL (ref 74–99)
GLUCOSE BLD MANUAL STRIP-MCNC: 119 MG/DL (ref 74–99)
GLUCOSE BLD MANUAL STRIP-MCNC: 135 MG/DL (ref 74–99)
GLUCOSE SERPL-MCNC: 104 MG/DL (ref 74–99)
HCT VFR BLD AUTO: 35.3 % (ref 36–46)
HGB BLD-MCNC: 10.9 G/DL (ref 12–16)
IMM GRANULOCYTES # BLD AUTO: 0.09 X10*3/UL (ref 0–0.5)
IMM GRANULOCYTES NFR BLD AUTO: 1 % (ref 0–0.9)
LYMPHOCYTES # BLD AUTO: 3.23 X10*3/UL (ref 0.8–3)
LYMPHOCYTES NFR BLD AUTO: 36.9 %
MCH RBC QN AUTO: 27.1 PG (ref 26–34)
MCHC RBC AUTO-ENTMCNC: 30.9 G/DL (ref 32–36)
MCV RBC AUTO: 88 FL (ref 80–100)
MONOCYTES # BLD AUTO: 0.79 X10*3/UL (ref 0.05–0.8)
MONOCYTES NFR BLD AUTO: 9 %
NEUTROPHILS # BLD AUTO: 4.42 X10*3/UL (ref 1.6–5.5)
NEUTROPHILS NFR BLD AUTO: 50.4 %
NRBC BLD-RTO: 0 /100 WBCS (ref 0–0)
P AXIS: 53 DEGREES
P OFFSET: 191 MS
P ONSET: 135 MS
PLATELET # BLD AUTO: 233 X10*3/UL (ref 150–450)
POTASSIUM SERPL-SCNC: 4.1 MMOL/L (ref 3.5–5.3)
PR INTERVAL: 166 MS
PROT SERPL-MCNC: 5.9 G/DL (ref 6.4–8.2)
Q ONSET: 218 MS
Q ONSET: 219 MS
QRS COUNT: 22 BEATS
QRS COUNT: 9 BEATS
QRS DURATION: 74 MS
QRS DURATION: 76 MS
QT INTERVAL: 314 MS
QT INTERVAL: 452 MS
QTC CALCULATION(BAZETT): 443 MS
QTC CALCULATION(BAZETT): 479 MS
QTC FREDERICIA: 416 MS
QTC FREDERICIA: 446 MS
R AXIS: 31 DEGREES
R AXIS: 41 DEGREES
RBC # BLD AUTO: 4.02 X10*6/UL (ref 4–5.2)
SODIUM SERPL-SCNC: 141 MMOL/L (ref 136–145)
T AXIS: 160 DEGREES
T AXIS: 78 DEGREES
T OFFSET: 376 MS
T OFFSET: 444 MS
VENTRICULAR RATE: 140 BPM
VENTRICULAR RATE: 58 BPM
WBC # BLD AUTO: 8.8 X10*3/UL (ref 4.4–11.3)

## 2024-12-13 PROCEDURE — 2500000001 HC RX 250 WO HCPCS SELF ADMINISTERED DRUGS (ALT 637 FOR MEDICARE OP): Performed by: NURSE PRACTITIONER

## 2024-12-13 PROCEDURE — RXMED WILLOW AMBULATORY MEDICATION CHARGE

## 2024-12-13 PROCEDURE — 85025 COMPLETE CBC W/AUTO DIFF WBC: CPT | Performed by: INTERNAL MEDICINE

## 2024-12-13 PROCEDURE — 99232 SBSQ HOSP IP/OBS MODERATE 35: CPT

## 2024-12-13 PROCEDURE — 93270 REMOTE 30 DAY ECG REV/REPORT: CPT

## 2024-12-13 PROCEDURE — 80053 COMPREHEN METABOLIC PANEL: CPT | Performed by: INTERNAL MEDICINE

## 2024-12-13 PROCEDURE — 36415 COLL VENOUS BLD VENIPUNCTURE: CPT | Performed by: INTERNAL MEDICINE

## 2024-12-13 PROCEDURE — 82947 ASSAY GLUCOSE BLOOD QUANT: CPT

## 2024-12-13 RX ORDER — METHIMAZOLE 5 MG/1
5 TABLET ORAL DAILY
Qty: 30 TABLET | Refills: 0 | Status: SHIPPED | OUTPATIENT
Start: 2024-12-14 | End: 2025-01-13

## 2024-12-13 RX ORDER — POLYETHYLENE GLYCOL 3350 17 G/17G
17 POWDER, FOR SOLUTION ORAL DAILY
Start: 2024-12-14

## 2024-12-13 RX ORDER — METOPROLOL TARTRATE 100 MG/1
100 TABLET ORAL 2 TIMES DAILY
Qty: 60 TABLET | Refills: 0 | Status: SHIPPED | OUTPATIENT
Start: 2024-12-13 | End: 2025-01-12

## 2024-12-13 ASSESSMENT — PAIN SCALES - GENERAL: PAINLEVEL_OUTOF10: 0 - NO PAIN

## 2024-12-13 ASSESSMENT — COGNITIVE AND FUNCTIONAL STATUS - GENERAL
MOBILITY SCORE: 18
MOVING TO AND FROM BED TO CHAIR: A LITTLE
STANDING UP FROM CHAIR USING ARMS: A LITTLE
WALKING IN HOSPITAL ROOM: A LITTLE
CLIMB 3 TO 5 STEPS WITH RAILING: A LOT
DAILY ACTIVITIY SCORE: 24
TURNING FROM BACK TO SIDE WHILE IN FLAT BAD: A LITTLE

## 2024-12-13 NOTE — PROGRESS NOTES
Name: Nicki Paulson  MRN: [id number]  Encounter Date: 12/13/2024  PCP: Gerardo Ralph MD  Cardiologist: Dr. William Roman MD    Reason for consult: A-fib RVR  Attending Provider: Dr. Devan Burgess MD    Cardiology Progress Note    Subjective   NAEO, denies chest pain, palpitations.    Medications   aspirin, 81 mg, Nightly  cetirizine, 10 mg, Daily  insulin lispro, 0-5 Units, TID AC  lisinopril, 20 mg, Daily  metFORMIN XR, 500 mg, Daily with evening meal  methIMAzole, 5 mg, Daily  metoprolol tartrate, 50 mg, BID  pantoprazole, 40 mg, Daily before breakfast  polyethylene glycol, 17 g, Daily      dilTIAZem, Last Rate: Stopped (12/12/24 0149)      acetaminophen, 650 mg, q4h PRN  albuterol, 2 puff, q4h PRN  fluticasone, 1 spray, Daily PRN  ipratropium-albuteroL, 3 mL, q2h PRN  oxygen, , Continuous PRN - O2/gases      Physical Exam     Vitals:    12/12/24 2314 12/13/24 0310 12/13/24 0739 12/13/24 1123   BP: 131/61 139/65 139/65 139/65   BP Location: Right arm Right arm Right arm Right arm   Patient Position: Lying Lying Lying Lying   Pulse: 71 68 66 71   Resp: 18 18 16 16   Temp: 35.7 °C (96.3 °F) 35.9 °C (96.6 °F) 35.9 °C (96.6 °F) 36.6 °C (97.9 °F)   TempSrc: Temporal Temporal Temporal Temporal   SpO2: 94% 94% 98% 96%   Weight:       Height:         Gen: awake, alert, in no acute distress  HEENT: AT/NC, PEERL, EOMI, no LAD  CV: RRR, no m/r/g  Pulm: CTAB, without w/r/r  Abd: soft, NT/ND, no organomegaly  Ext: no LE edema  Skin: warm and dry  Neuro: A&Ox4, moves all 4 extremities spontaneously     Labs     Results from last 7 days   Lab Units 12/13/24  0517 12/12/24  0543 12/11/24  1107   WBC AUTO x10*3/uL 8.8 9.8 9.2   HEMOGLOBIN g/dL 10.9* 11.2* 12.7   HEMATOCRIT % 35.3* 35.9* 39.9   PLATELETS AUTO x10*3/uL 233 247 278     Results from last 7 days   Lab Units 12/13/24  0517 12/12/24  0543 12/11/24  1107   SODIUM mmol/L 141 142 141   POTASSIUM mmol/L 4.1 4.4 4.4   CHLORIDE mmol/L 109* 110* 107   CO2 mmol/L 24 24  24   BUN mg/dL 20 22 19   CREATININE mg/dL 0.81 0.97 0.94   CALCIUM mg/dL 8.6 8.7 9.2   PROTEIN TOTAL g/dL 5.9* 5.9* 6.9   BILIRUBIN TOTAL mg/dL 0.4 0.4 0.4   ALK PHOS U/L 69 67 87   ALT U/L 15 19 23   AST U/L 19 22 33   GLUCOSE mg/dL 104* 117* 191*     Lab Results   Component Value Date    ALT 15 12/13/2024    AST 19 12/13/2024    ALKPHOS 69 12/13/2024    BILITOT 0.4 12/13/2024     Imaging   TTE 12/11/2024  CONCLUSIONS:   1. Left ventricular ejection fraction is normal, by visual estimate at 60-65%.   2. Spectral Doppler shows a Grade I (impaired relaxation pattern) of left ventricular diastolic filling with normal left atrial filling pressure.   3. There is normal right ventricular global systolic function.   4. There is moderate mitral annular calcification.   5. Mild to moderately elevated right ventricular systolic pressure.   6. Aortic valve sclerosis.    Holter Or Event Cardiac Monitor         Transthoracic echo (TTE) complete   Final Result      XR chest 1 view   Final Result   1.  No evidence of acute cardiopulmonary process.                  MACRO:   None        Signed by: Berny Martinez 12/11/2024 11:21 AM   Dictation workstation:   [id number]        Assessment/Plan   Nicki Paulson is a 79 y.o. female with a PMHx of paroxysmal A-fib, HTN, moderate mitral regurgitation, moderate tricuspid regurgitation, HTN, HLD, NIDDM, Graves' disease with subsequent hypothyroidism, Sjogren's, GERD, varicose veins, osteoporosis with vitamin D deficiency, sciatica s/p multiple spinal surgeries, diverticulosis and diverticulitis, arthritis on Plaquenil who presented to [facility] on 12/11/2024 with palpitations.    A-fib RVR 2/2 thyrotoxicosis, resolved  [id number] Stroke Risk Points: 6   Values used to calculate this score:    Points  Metrics       0        Has Congestive Heart Failure: No       1        Has Hypertension: Yes       2        Age: 79       1        Has Diabetes: Yes       0        Had Stroke: No                  Had TIA: No                 Had Thromboembolism: No       1        Has Vascular Disease: Yes       1        Clinically Relevant Sex: Female    -Continue with methimazole per endocrinology  -Continue Lopressor 50 twice daily to maintain normal sinus rhythm  -For recurrence, may consider a more nonselective beta blocker for rhythm suppression  -30-day event monitor on discharge with subsequent OP cardiology follow-up with Dr. Roman  -Has indication for long term AC  -Declined AC - would like to discuss with primary cardiologist.  Close follow-up after discharge with cardiology       Thank you for this consult, we will continue to follow. Patient seen and discussed with attending physician, Dr. Burgess, who agrees with the above.     Cardiology will be signing off now, do not hesitate to reconsult if needed.    Lalo Thayer, DO  Internal Medicine Resident, PGY2

## 2024-12-13 NOTE — CARE PLAN
The patient's goals for the shift include  the pt will remain comfortable, and sleep for four hours without interruption    The clinical goals for the shift include Pt will remain in NSR, and HDS    Problem: Pain - Adult  Goal: Verbalizes/displays adequate comfort level or baseline comfort level  Outcome: Progressing     Problem: Skin  Goal: Decreased wound size/increased tissue granulation at next dressing change  Outcome: Progressing  Goal: Participates in plan/prevention/treatment measures  Outcome: Progressing  Goal: Prevent/manage excess moisture  Outcome: Progressing  Goal: Prevent/minimize sheer/friction injuries  Reactivated  Goal: Promote/optimize nutrition  Reactivated  Goal: Promote skin healing  Reactivated

## 2024-12-13 NOTE — PROGRESS NOTES
Nicki Paulson is a 79 y.o. female on day 2 of admission presenting with Atrial fibrillation with RVR (Multi).      Subjective   Patient fully evaluated  12/12  for    Problem List Items Addressed This Visit       Paroxysmal atrial fibrillation (Multi)    Relevant Medications    dilTIAZem (Cardizem) 125 mg in dextrose 5% 125 mL (1 mg/mL) infusion (premix)    metoprolol tartrate (Lopressor) tablet 50 mg    metoprolol tartrate (Lopressor) 100 mg tablet    Hypothyroidism    * (Principal) Atrial fibrillation with RVR (Multi) - Primary    Relevant Medications    dilTIAZem (Cardizem) 125 mg in dextrose 5% 125 mL (1 mg/mL) infusion (premix)    metoprolol tartrate (Lopressor) tablet 50 mg    metoprolol tartrate (Lopressor) 100 mg tablet    Other Relevant Orders    Transthoracic echo (TTE) complete (Completed)    Holter Or Event Cardiac Monitor     Other Visit Diagnoses       Paroxysmal A-fib (Multi)        Relevant Medications    metoprolol tartrate (Lopressor) injection 5 mg (Completed)    metoprolol tartrate (Lopressor) injection 5 mg (Completed)    dilTIAZem (Cardizem) 125 mg in dextrose 5% 125 mL (1 mg/mL) infusion (premix)    dilTIAZem (Cardizem) injection 10 mg (Completed)    metoprolol tartrate (Lopressor) tablet 50 mg    polyethylene glycol (Glycolax, Miralax) 17 gram packet (Start on 12/14/2024)    methIMAzole (Tapazole) 5 mg tablet (Start on 12/14/2024)    metoprolol tartrate (Lopressor) 100 mg tablet    Other Relevant Orders    Transthoracic echo (TTE) complete (Completed)            Vital signs for last 24 hours   Temp:  [35.5 °C (95.9 °F)-36.6 °C (97.9 °F)] 36.6 °C (97.9 °F)  Heart Rate:  [66-73] 71  Resp:  [16-18] 16  BP: (131-144)/(61-65) 139/65    No intake/output data recorded.  Patient still requiring frequent cardiac and SPO2 monitoring. Continue aggressive pulmonary hygiene and oral hygiene. Off loading as tolerated for skin integrity.   Medications and labs reviewed-   Results for orders placed or  performed during the hospital encounter of 12/11/24 (from the past 24 hours)   POCT GLUCOSE   Result Value Ref Range    POCT Glucose 113 (H) 74 - 99 mg/dL   POCT GLUCOSE   Result Value Ref Range    POCT Glucose 171 (H) 74 - 99 mg/dL   POCT GLUCOSE   Result Value Ref Range    POCT Glucose 110 (H) 74 - 99 mg/dL   CBC and Auto Differential   Result Value Ref Range    WBC 8.8 4.4 - 11.3 x10*3/uL    nRBC 0.0 0.0 - 0.0 /100 WBCs    RBC 4.02 4.00 - 5.20 x10*6/uL    Hemoglobin 10.9 (L) 12.0 - 16.0 g/dL    Hematocrit 35.3 (L) 36.0 - 46.0 %    MCV 88 80 - 100 fL    MCH 27.1 26.0 - 34.0 pg    MCHC 30.9 (L) 32.0 - 36.0 g/dL    RDW 13.7 11.5 - 14.5 %    Platelets 233 150 - 450 x10*3/uL    Neutrophils % 50.4 40.0 - 80.0 %    Immature Granulocytes %, Automated 1.0 (H) 0.0 - 0.9 %    Lymphocytes % 36.9 13.0 - 44.0 %    Monocytes % 9.0 2.0 - 10.0 %    Eosinophils % 2.2 0.0 - 6.0 %    Basophils % 0.5 0.0 - 2.0 %    Neutrophils Absolute 4.42 1.60 - 5.50 x10*3/uL    Immature Granulocytes Absolute, Automated 0.09 0.00 - 0.50 x10*3/uL    Lymphocytes Absolute 3.23 (H) 0.80 - 3.00 x10*3/uL    Monocytes Absolute 0.79 0.05 - 0.80 x10*3/uL    Eosinophils Absolute 0.19 0.00 - 0.40 x10*3/uL    Basophils Absolute 0.04 0.00 - 0.10 x10*3/uL   Comprehensive metabolic panel   Result Value Ref Range    Glucose 104 (H) 74 - 99 mg/dL    Sodium 141 136 - 145 mmol/L    Potassium 4.1 3.5 - 5.3 mmol/L    Chloride 109 (H) 98 - 107 mmol/L    Bicarbonate 24 21 - 32 mmol/L    Anion Gap 12 10 - 20 mmol/L    Urea Nitrogen 20 6 - 23 mg/dL    Creatinine 0.81 0.50 - 1.05 mg/dL    eGFR 74 >60 mL/min/1.73m*2    Calcium 8.6 8.6 - 10.3 mg/dL    Albumin 3.4 3.4 - 5.0 g/dL    Alkaline Phosphatase 69 33 - 136 U/L    Total Protein 5.9 (L) 6.4 - 8.2 g/dL    AST 19 9 - 39 U/L    Bilirubin, Total 0.4 0.0 - 1.2 mg/dL    ALT 15 7 - 45 U/L   POCT GLUCOSE   Result Value Ref Range    POCT Glucose 105 (H) 74 - 99 mg/dL   POCT GLUCOSE   Result Value Ref Range    POCT Glucose 119 (H)  74 - 99 mg/dL   Holter Or Event Cardiac Monitor   Result Value Ref Range    BSA 1.62 m2      Patient recently received an antibiotic (last 12 hours)       None        Patient seen resting in bed with head of bed elevated, no s/s or c/o acute difficulties at this time. Patient on telemetry shows afib.   Plan discussed with interdisciplinary team, awaiting endocrine input and seen by cardiology today- Continue with methimazole per endocrinology  -Continue Lopressor 50 twice daily to maintain normal sinus rhythm  -For recurrence, may consider a more nonselective beta blocker for rhythm suppression  -Supplemental O2 as needed to maintain SpO2 >94%, currently on RA  -Telemetry ordered for continuous cardiac monitoring  -Trend cell counts, renal function, electrolyte labs  -Echocardiogram ordered to reevaluate structural and functional status  -30-day event monitor on discharge with subsequent OP cardiology follow-up with Dr. Roman  -Has indication for long term AC  -Declined AC - would like to discuss with primary cardiologist.  Appreciate input and agree with plan. Will continue current and repeat labs in the AM.    Per [facility] - PCP- Dr NETTA Ralph  Pharmacy- [facility]  Able to obtain and afford medications. Takes as prescribed.  Stated she has questions about 2 new medications-- informed her will ask RN to follow up regarding those. Asked RN- she will follow up.   Pt is from home, lives with . Stated she is independent, no use of any assistive devices.   Performs own ADL's.  Still drives. Able to get to follow up appointments.  Does not feel she will have any needs upon discharge. Discharge planning discussed with patient and care team. Therapy evaluations ordered. Anticipate return to home at discharge. Patient aware and agreeable to current plan, continue plan as above.     I spent a total of 50 minutes on the date of the service which included preparing to see the patient,  face-to-face patient care, completing clinical documentation, obtaining and/or reviewing separately obtained history, performing a medically appropriate examination, counseling and educating the patient/family/caregiver, ordering medications, tests, or procedures, communicating with other HCPs (not separately reported), independently interpreting results (not separately reported), communicating results to the patient/family/caregiver, and care coordination (not separately reported).        Objective     Last Recorded Vitals  /65 (BP Location: Right arm, Patient Position: Lying)   Pulse 71   Temp 36.6 °C (97.9 °F) (Temporal)   Resp 16   Wt 61.2 kg (135 lb)   SpO2 96%   Intake/Output last 3 Shifts:  No intake or output data in the 24 hours ending 12/13/24 1452      Admission Weight  Weight: 61.2 kg (135 lb) (12/11/24 1037)    Daily Weight  12/11/24 : 61.2 kg (135 lb)    Image Results  ECG 12 lead  Atrial fibrillation with rapid ventricular response  ST & T wave abnormality, consider inferolateral ischemia  Abnormal ECG  When compared with ECG of 20-SEP-2024 03:36,  ST no longer depressed in Inferior leads  ST no longer depressed in Anterolateral leads  T wave inversion less evident in Lateral leads  Electrocardiogram, 12-lead PRN ACS symptoms  Sinus bradycardia  Nonspecific ST and T wave abnormality  Abnormal ECG  When compared with ECG of 12-DEC-2024 01:36, (unconfirmed)  No significant change was found      Physical Exam    Relevant Results               Assessment/Plan   This patient currently has cardiac telemetry ordered; if you would like to modify or discontinue the telemetry order, click here to go to the orders activity to modify/discontinue the order.              Assessment & Plan  Atrial fibrillation with RVR (Multi)          Tripp Dexter MD   Physician  Internal Medicine     H&P      Addendum     Date of Service: 12/11/2024  3:46 PM     Addendum       Expand All Collapse All    History Of  Present Illness  Nicki Paulson is a 79 y.o. female with past medical history of hypertension, hyperlipidemia, diabetes mellitus, atrial fibrillation and valvular heart disease (No Ac, aspirin only), Graves' disease, hypothyroidism, Sjogren's, osteoporosis, multiple spinal surgeries w/ most recent 3/2024 , appendectomy, JONATHAN, anxiety, and ischemic colitis who presented today with palpitations.  Patient reports she occasionally gets palpitations however they last no longer than 15 minutes.  She today, she reports she started getting palpitations that lasted for 2 to 3 hours so she decided to come to the emergency room for evaluation.  She admits to associated lightheadedness, dizziness, and shortness of breath.  She denies any associated nausea or diaphoresis.  She denies any recent illness.  She continues that she sees Dr. Sanford for her thyroid and on December 2 she was noted to be hyperthyroid so her Synthroid was stopped.  She continues that she is not on anticoagulation due to history of GI/rectal bleeding and she was to follow-up with Dr. Roman in April to discuss other medication options.     In the ED lab work, EKG, and chest x-ray were performed.  Labs revealed troponins 47 and 44 respectively, free thyroxine 1.46, TSH less than 0.01 (these numbers are unchanged from labs taken on December 2, 2024). EKG on arrival reveals atrial fibrillation with a rate of 140.  This was interpreted by the ED physician. Chest x-ray was negative for acute process.  She arrived with a heart rate of 140 and a low temp of 35.8, her vitals were otherwise stable.  She was given metoprolol and Cardizem and admitted for further evaluation and treatment under the care of Dr. Dexter.  Case discussed with Dr. Sanford and methimazole started.     Past Medical History: As above  Past Surgical history: Saphenous vein ablation, Knee replacement, Lumbar laminectomy, b/l foraminotomy 2-4; T spine T12 laminectomy, cervical fusion,  tonsillectomy, total abdominal hysterectomy, appendectomy, and bladder suspension surgery.  Social history: Non-smoker, no illicit drug use, alcohol abuse.  Family history: Noncontributory  10 point ROS performed and is negative besides what is stated in HPI.     Past Medical History  Medical History        Past Medical History:   Diagnosis Date    Disease of thyroid gland      Other chest pain       Chest pressure    Personal history of other endocrine, nutritional and metabolic disease       History of type 2 diabetes mellitus    Personal history of other specified conditions       History of palpitations    Varicose veins of unspecified lower extremity with other complications       Varicose veins of leg with complications            Surgical History  Surgical History         Past Surgical History:   Procedure Laterality Date    BLADDER SURGERY   07/05/2018     Bladder Surgery            Social History  She reports that she has never smoked. She has never used smokeless tobacco. She reports that she does not drink alcohol and does not use drugs.  Lives with      Family History  Daughter: Lupus     Allergies  Atorvastatin, Bactrim [sulfamethoxazole-trimethoprim], Ciprofloxacin, Doxycycline, and Cyclosporine     Physical Exam  Constitutional:       Appearance: Normal appearance.   HENT:      Head: Normocephalic and atraumatic.      Nose: Nose normal.      Mouth/Throat:      Mouth: Mucous membranes are moist.   Eyes:      Conjunctiva/sclera: Conjunctivae normal.   Cardiovascular:      Rate and Rhythm: Normal rate. Rhythm irregular.      Heart sounds: Normal heart sounds.   Pulmonary:      Effort: Pulmonary effort is normal.      Breath sounds: Normal breath sounds.   Abdominal:      General: Bowel sounds are normal.      Palpations: Abdomen is soft.      Tenderness: There is abdominal tenderness.   Musculoskeletal:         General: Normal range of motion.      Cervical back: Neck supple.   Skin:     General:  "Skin is warm and dry.   Neurological:      Mental Status: She is alert. Mental status is at baseline.   Psychiatric:         Mood and Affect: Mood normal.            Last Recorded Vitals  Blood pressure 117/58, pulse 88, temperature 35.8 °C (96.4 °F), temperature source Temporal, resp. rate 20, height 1.549 m (5' 1\"), weight 61.2 kg (135 lb), SpO2 (!) 93%.     Relevant Results     Medications Ordered Prior to Encounter   No current facility-administered medications on file prior to encounter.             Current Outpatient Medications on File Prior to Encounter   Medication Sig Dispense Refill    acetaminophen (Tylenol) 325 mg tablet Take 2 tablets (650 mg) by mouth every 4 hours if needed for fever (temp greater than 38.0 C) (greater than or equal to 38 C). 30 tablet 0    albuterol 90 mcg/actuation inhaler Inhale 2 puffs every 4 hours if needed.        aspirin 81 mg EC tablet Take 1 tablet (81 mg) by mouth once daily at bedtime.        calcium carbonate (Tums Extra Strength) 300 mg (750 mg) chewable tablet Chew 300 mg 3 times a day as needed for indigestion or heartburn.        CALCIUM-MAGNESIUM-ZINC ORAL Take 1 tablet by mouth once daily at bedtime.        cholecalciferol (D3-2000) 50 mcg (2,000 unit) capsule Take 1 capsule (50 mcg) by mouth once daily at bedtime.        fexofenadine (Allegra Allergy) 180 mg tablet Take 1 tablet (180 mg) by mouth once daily in the morning.        fluticasone (Flonase) 50 mcg/actuation nasal spray Administer 1 spray into each nostril once daily as needed for allergies.        lisinopril 20 mg tablet Take 1 tablet (20 mg) by mouth once daily.        metFORMIN  mg 24 hr tablet Take 1 tablet (500 mg) by mouth once daily in the evening. Take with meals.        metoprolol tartrate (Lopressor) 50 mg tablet Take 1 tablet (50 mg) by mouth 2 times a day. 180 tablet 3    multivitamin with minerals tablet Take 1 tablet by mouth once daily in the morning.        omeprazole (PriLOSEC) 40 " mg DR capsule Take 1 capsule (40 mg) by mouth if needed (as needed).        hydroxychloroquine (Plaquenil) 200 mg tablet Take 1 tablet (200 mg) by mouth once daily in the morning.        levothyroxine (Synthroid) 50 mcg tablet Take 1 tablet (50 mcg) by mouth 4 times a week. Every Monday/Tuesday/Thursday/Friday (Patient not taking: Reported on 12/11/2024)                      Results for orders placed or performed during the hospital encounter of 12/11/24 (from the past 24 hours)   CBC and Auto Differential   Result Value Ref Range     WBC 9.2 4.4 - 11.3 x10*3/uL     nRBC 0.0 0.0 - 0.0 /100 WBCs     RBC 4.52 4.00 - 5.20 x10*6/uL     Hemoglobin 12.7 12.0 - 16.0 g/dL     Hematocrit 39.9 36.0 - 46.0 %     MCV 88 80 - 100 fL     MCH 28.1 26.0 - 34.0 pg     MCHC 31.8 (L) 32.0 - 36.0 g/dL     RDW 13.5 11.5 - 14.5 %     Platelets 278 150 - 450 x10*3/uL     Neutrophils % 62.5 40.0 - 80.0 %     Immature Granulocytes %, Automated 1.3 (H) 0.0 - 0.9 %     Lymphocytes % 29.0 13.0 - 44.0 %     Monocytes % 6.0 2.0 - 10.0 %     Eosinophils % 0.8 0.0 - 6.0 %     Basophils % 0.4 0.0 - 2.0 %     Neutrophils Absolute 5.77 (H) 1.60 - 5.50 x10*3/uL     Immature Granulocytes Absolute, Automated 0.12 0.00 - 0.50 x10*3/uL     Lymphocytes Absolute 2.68 0.80 - 3.00 x10*3/uL     Monocytes Absolute 0.55 0.05 - 0.80 x10*3/uL     Eosinophils Absolute 0.07 0.00 - 0.40 x10*3/uL     Basophils Absolute 0.04 0.00 - 0.10 x10*3/uL   Magnesium   Result Value Ref Range     Magnesium 1.66 1.60 - 2.40 mg/dL   Comprehensive metabolic panel   Result Value Ref Range     Glucose 191 (H) 74 - 99 mg/dL     Sodium 141 136 - 145 mmol/L     Potassium 4.4 3.5 - 5.3 mmol/L     Chloride 107 98 - 107 mmol/L     Bicarbonate 24 21 - 32 mmol/L     Anion Gap 14 10 - 20 mmol/L     Urea Nitrogen 19 6 - 23 mg/dL     Creatinine 0.94 0.50 - 1.05 mg/dL     eGFR 62 >60 mL/min/1.73m*2     Calcium 9.2 8.6 - 10.3 mg/dL     Albumin 4.1 3.4 - 5.0 g/dL     Alkaline Phosphatase 87 33 -  136 U/L     Total Protein 6.9 6.4 - 8.2 g/dL     AST 33 9 - 39 U/L     Bilirubin, Total 0.4 0.0 - 1.2 mg/dL     ALT 23 7 - 45 U/L   TSH with reflex to Free T4 if abnormal   Result Value Ref Range     Thyroid Stimulating Hormone <0.01 (L) 0.44 - 3.98 mIU/L   Troponin I, High Sensitivity, Initial   Result Value Ref Range     Troponin I, High Sensitivity 47 (H) 0 - 13 ng/L   Thyroxine, Free   Result Value Ref Range     Thyroxine, Free 1.46 (H) 0.61 - 1.12 ng/dL   Troponin, High Sensitivity, 1 Hour   Result Value Ref Range     Troponin I, High Sensitivity 44 (H) 0 - 13 ng/L         XR chest 1 view     Result Date: 12/11/2024  Interpreted By:  Berny Martinez, STUDY: XR CHEST 1 VIEW;  12/11/2024 11:18 am   INDICATION: Signs/Symptoms:cp, dizzy.     COMPARISON: 09/22/2024   ACCESSION NUMBER(S): [id number]   ORDERING CLINICIAN: CRISS SANDRA   FINDINGS:         CARDIOMEDIASTINAL SILHOUETTE: Cardiomediastinal silhouette is normal in size and configuration.   LUNGS: Lungs are clear. There is no consolidation or effusion.   ABDOMEN: No remarkable upper abdominal findings.   BONES: No acute osseous changes.        1.  No evidence of acute cardiopulmonary process.       MACRO: None   Signed by: Berny Martinez 12/11/2024 11:21 AM Dictation workstation:   [id number]          Assessment/Plan     Assessment & Plan  Atrial fibrillation with RVR (Multi)        Hyperthyroidism  Elevated troponin      admit to telemetry  Inpatient  Appreciate cardiology and endocrine recommendations  Continue to hold home Synthroid  Start methimazole  Echo  A.m. CBC and BMP  Add T3  Continue Cardizem drip  N.p.o. after midnight  Given history of rectal bleeding, defer need for anticoagulation to cardiology        Chronic conditions: Hypertension, hyperlipidemia, diabetes, A-fib, Graves' disease, so Graves' disease, anxiety     Continue home medications as listed above     DVT prophylaxis     SCDs  Lovenox     Patient fully evaluated and plan  as above  Patient fully evaluated, repeat labs in AM and continue plan as above.  I spent 75 minutes in the professional and overall care of this patient.                 Revision History            Patient fully evaluated  12/13  for    Problem List Items Addressed This Visit       Paroxysmal atrial fibrillation (Multi)    Relevant Medications    dilTIAZem (Cardizem) 125 mg in dextrose 5% 125 mL (1 mg/mL) infusion (premix)    metoprolol tartrate (Lopressor) tablet 50 mg    metoprolol tartrate (Lopressor) 100 mg tablet    Hypothyroidism    * (Principal) Atrial fibrillation with RVR (Multi) - Primary    Relevant Medications    dilTIAZem (Cardizem) 125 mg in dextrose 5% 125 mL (1 mg/mL) infusion (premix)    metoprolol tartrate (Lopressor) tablet 50 mg    metoprolol tartrate (Lopressor) 100 mg tablet    Other Relevant Orders    Transthoracic echo (TTE) complete (Completed)    Holter Or Event Cardiac Monitor     Other Visit Diagnoses       Paroxysmal A-fib (Multi)        Relevant Medications    metoprolol tartrate (Lopressor) injection 5 mg (Completed)    metoprolol tartrate (Lopressor) injection 5 mg (Completed)    dilTIAZem (Cardizem) 125 mg in dextrose 5% 125 mL (1 mg/mL) infusion (premix)    dilTIAZem (Cardizem) injection 10 mg (Completed)    metoprolol tartrate (Lopressor) tablet 50 mg    polyethylene glycol (Glycolax, Miralax) 17 gram packet (Start on 12/14/2024)    methIMAzole (Tapazole) 5 mg tablet (Start on 12/14/2024)    metoprolol tartrate (Lopressor) 100 mg tablet    Other Relevant Orders    Transthoracic echo (TTE) complete (Completed)          Vital signs for last 24 hours   Temp:  [35.5 °C (95.9 °F)-36.6 °C (97.9 °F)] 36.6 °C (97.9 °F)  Heart Rate:  [66-73] 71  Resp:  [16-18] 16  BP: (131-144)/(61-65) 139/65    No intake/output data recorded.    Patient still requiring frequent cardiac and SPO2 monitoring. Continue aggressive pulmonary hygiene and oral hygiene. Off loading as tolerated for skin integrity.    Medications and labs reviewed-   Results for orders placed or performed during the hospital encounter of 12/11/24 (from the past 24 hours)   POCT GLUCOSE   Result Value Ref Range    POCT Glucose 113 (H) 74 - 99 mg/dL   POCT GLUCOSE   Result Value Ref Range    POCT Glucose 171 (H) 74 - 99 mg/dL   POCT GLUCOSE   Result Value Ref Range    POCT Glucose 110 (H) 74 - 99 mg/dL   CBC and Auto Differential   Result Value Ref Range    WBC 8.8 4.4 - 11.3 x10*3/uL    nRBC 0.0 0.0 - 0.0 /100 WBCs    RBC 4.02 4.00 - 5.20 x10*6/uL    Hemoglobin 10.9 (L) 12.0 - 16.0 g/dL    Hematocrit 35.3 (L) 36.0 - 46.0 %    MCV 88 80 - 100 fL    MCH 27.1 26.0 - 34.0 pg    MCHC 30.9 (L) 32.0 - 36.0 g/dL    RDW 13.7 11.5 - 14.5 %    Platelets 233 150 - 450 x10*3/uL    Neutrophils % 50.4 40.0 - 80.0 %    Immature Granulocytes %, Automated 1.0 (H) 0.0 - 0.9 %    Lymphocytes % 36.9 13.0 - 44.0 %    Monocytes % 9.0 2.0 - 10.0 %    Eosinophils % 2.2 0.0 - 6.0 %    Basophils % 0.5 0.0 - 2.0 %    Neutrophils Absolute 4.42 1.60 - 5.50 x10*3/uL    Immature Granulocytes Absolute, Automated 0.09 0.00 - 0.50 x10*3/uL    Lymphocytes Absolute 3.23 (H) 0.80 - 3.00 x10*3/uL    Monocytes Absolute 0.79 0.05 - 0.80 x10*3/uL    Eosinophils Absolute 0.19 0.00 - 0.40 x10*3/uL    Basophils Absolute 0.04 0.00 - 0.10 x10*3/uL   Comprehensive metabolic panel   Result Value Ref Range    Glucose 104 (H) 74 - 99 mg/dL    Sodium 141 136 - 145 mmol/L    Potassium 4.1 3.5 - 5.3 mmol/L    Chloride 109 (H) 98 - 107 mmol/L    Bicarbonate 24 21 - 32 mmol/L    Anion Gap 12 10 - 20 mmol/L    Urea Nitrogen 20 6 - 23 mg/dL    Creatinine 0.81 0.50 - 1.05 mg/dL    eGFR 74 >60 mL/min/1.73m*2    Calcium 8.6 8.6 - 10.3 mg/dL    Albumin 3.4 3.4 - 5.0 g/dL    Alkaline Phosphatase 69 33 - 136 U/L    Total Protein 5.9 (L) 6.4 - 8.2 g/dL    AST 19 9 - 39 U/L    Bilirubin, Total 0.4 0.0 - 1.2 mg/dL    ALT 15 7 - 45 U/L   POCT GLUCOSE   Result Value Ref Range    POCT Glucose 105 (H) 74 - 99 mg/dL    POCT GLUCOSE   Result Value Ref Range    POCT Glucose 119 (H) 74 - 99 mg/dL   Holter Or Event Cardiac Monitor   Result Value Ref Range    BSA 1.62 m2      Patient recently received an antibiotic (last 12 hours)       None           Patient without acute difficulties overnight.Plan discussed with interdisciplinary team, ECHO in process, awaiting results and cardiology recommendations. Patient seen by endocrinology today related to graves disease - Thyrotoxicosis: Likely recurrence of Graves' disease.  Her antibodies are positive.  The patient's been started on methimazole 5 mg daily.  I counseled her once again that she has to have repeat thyroid function testing in 4 to 6 weeks.  I will place those orders in the  system for her to have.  She understands that she supposed to reach out to me when she does this so I can review them and adjust her antithyroid drug accordingly.  She was counseled about the adverse events of thionamides.     # Uncontrolled Type 2 Diabetes Mellitus  Home Regimen: Metformin  mg qDinner  Managed by her PCP as Outpatient.   - Cont SSI for now      # Atrial Fibrillation: Per cardiology.  We will try to achieve euthyroidism as fast as possible so as to not exacerbate this issue.     # Dyslipidemia: The patient is followed by Dr. Roman.  She is reportedly statin intolerant    Appreciate input and agree with plan. Will continue current and repeat labs in the AM.      Patient with family in room, plan discussed with patient and family, all agreeable. Supplemental oxygen at times related to exertional dyspnea, not required at rest. Patient seen resting in bedside chair, no distress noted, 97% SpO2 on room air.   continue current and repeat labs in the AM.     Discharge planning discussed with patient and care team. Therapy evaluations ordered. [facility], anticipate HHC/SNF at discharge. Patient aware and agreeable to current plan, continue plan as above.     I spent a total of 50 minutes  on the date of the service which included preparing to see the patient, face-to-face patient care, completing clinical documentation, obtaining and/or reviewing separately obtained history, performing a medically appropriate examination, counseling and educating the patient/family/caregiver, ordering medications, tests, or procedures, communicating with other HCPs (not separately reported), independently interpreting results (not separately reported), communicating results to the patient/family/caregiver, and care coordination (not separately reported).        Lorrie [place]ong

## 2024-12-13 NOTE — CARE PLAN
The patient's goals for the shift include      The clinical goals for the shift include patient will maintain safety and comfort and refrain from further complications    Over the shift, the patient will maintain comfort and safety and refrain from further complications

## 2024-12-13 NOTE — PROGRESS NOTES
12/13/24 0806   Discharge Planning   Living Arrangements Spouse/significant other   Support Systems Spouse/significant other   Assistance Needed Patient at baseline is A&OX3, Independent, drives and does not use any assistive devices. Patient had no needs upon D/C   Type of Residence Private residence   Number of Stairs to Enter Residence 3   Number of Stairs Within Residence 0   Home or Post Acute Services None   Expected Discharge Disposition Home   Intensity of Service   Intensity of Service 0-30 min

## 2024-12-13 NOTE — PROGRESS NOTES
"    Endocrinology Inpatient Consult Progress Note     PATIENT NAME: Nicki Paulson  MRN: [id number]  DATE: 12/13/2024    CONSULTING PHYSICIAN: Dr Dexter  REASON FOR CONSULT: Graves Disease      Interval Events     Feeling better.  Her palpitations have decreased.  She denies any chest pain.  She denies any shortness of breath.  She is asking when she can go home.      Physical Examination     /65 (BP Location: Right arm, Patient Position: Lying)   Pulse 68   Temp 35.9 °C (96.6 °F) (Temporal)   Resp 18   Ht 1.549 m (5' 1\")   Wt 61.2 kg (135 lb)   SpO2 94%   BMI 25.51 kg/m²   No acute distress.  Appropriate affect  Regular rate and rhythm.  On the respiration.  Soft and nondistended abdomen.  No pedal edema bilaterally.      Medications     Reviewed MAR       Data     Recent Labs and Imaging Reviewed      Assessment / Plan        # Thyrotoxicosis: Likely recurrence of Graves' disease.  Her antibodies are positive.  The patient's been started on methimazole 5 mg daily.  I counseled her once again that she has to have repeat thyroid function testing in 4 to 6 weeks.  I will place those orders in the  system for her to have.  She understands that she supposed to reach out to me when she does this so I can review them and adjust her antithyroid drug accordingly.  She was counseled about the adverse events of thionamides.    # Uncontrolled Type 2 Diabetes Mellitus  Home Regimen: Metformin  mg qDinner  Managed by her PCP as Outpatient.   - Cont SSI for now      # Atrial Fibrillation: Per cardiology.  We will try to achieve euthyroidism as fast as possible so as to not exacerbate this issue.     # Dyslipidemia: The patient is followed by Dr. Roman.  She is reportedly statin intolerant.         Matthew Sanford, DO  Endocrinology, Diabetes, and Metabolism    Available via [employer] Messenger    Please excuse any typographical or unwanted errors within this documentation as voice recognition software was used to " dictate this note.

## 2024-12-19 LAB
Q ONSET: 219 MS
QRS COUNT: 22 BEATS
QRS DURATION: 74 MS
QT INTERVAL: 314 MS
QTC CALCULATION(BAZETT): 479 MS
QTC FREDERICIA: 416 MS
R AXIS: 31 DEGREES
T AXIS: 160 DEGREES
T OFFSET: 376 MS
VENTRICULAR RATE: 140 BPM

## 2024-12-21 LAB
ATRIAL RATE: 58 BPM
P AXIS: 53 DEGREES
P OFFSET: 191 MS
P ONSET: 135 MS
PR INTERVAL: 166 MS
Q ONSET: 218 MS
QRS COUNT: 9 BEATS
QRS DURATION: 76 MS
QT INTERVAL: 452 MS
QTC CALCULATION(BAZETT): 443 MS
QTC FREDERICIA: 446 MS
R AXIS: 41 DEGREES
T AXIS: 78 DEGREES
T OFFSET: 444 MS
VENTRICULAR RATE: 58 BPM

## 2024-12-24 NOTE — DISCHARGE SUMMARY
Discharge Diagnosis  Atrial fibrillation with RVR (Multi)    Issues Requiring Follow-Up  Discharged on December 13 with normal heart rate    Discharge Meds     Medication List      START taking these medications     methIMAzole 5 mg tablet; Commonly known as: Tapazole; Take 1 tablet (5   mg) by mouth once daily.   polyethylene glycol 17 gram packet; Commonly known as: Glycolax,   Miralax; Take 17 g by mouth once daily.     CHANGE how you take these medications     metoprolol tartrate 100 mg tablet; Commonly known as: Lopressor; Take 1   tablet (100 mg) by mouth 2 times a day.; What changed: medication   strength, how much to take     CONTINUE taking these medications     acetaminophen 325 mg tablet; Commonly known as: Tylenol; Take 2 tablets   (650 mg) by mouth every 4 hours if needed for fever (temp greater than   38.0 C) (greater than or equal to 38 C).   albuterol 90 mcg/actuation inhaler   Allegra Allergy 180 mg tablet; Generic drug: fexofenadine   aspirin 81 mg EC tablet   CALCIUM-MAGNESIUM-ZINC ORAL   D3-2000 50 mcg (2,000 unit) capsule; Generic drug: cholecalciferol   fluticasone 50 mcg/actuation nasal spray; Commonly known as: Flonase   lisinopril 20 mg tablet   metFORMIN  mg 24 hr tablet; Commonly known as: Glucophage-XR   multivitamin with minerals tablet   omeprazole 40 mg DR capsule; Commonly known as: PriLOSEC     STOP taking these medications     calcium carbonate 300 mg (750 mg) chewable tablet; Commonly known as:   Tums Extra Strength   hydroxychloroquine 200 mg tablet; Commonly known as: Plaquenil   Synthroid 50 mcg tablet; Generic drug: levothyroxine       Test Results Pending At Discharge  Pending Labs       No current pending labs.          Hospital Course           Tripp Dexter MD  Physician  Internal Medicine     Progress Notes      Signed     Date of Service: 12/13/2024  2:52 PM     Signed       Expand All Collapse All    Nicki Paulson is a 79 y.o. female on day 2 of admission  presenting with Atrial fibrillation with RVR (Multi).           Subjective  Patient fully evaluated  12/12  for    Problem List Items Addressed This Visit         Paroxysmal atrial fibrillation (Multi)     Relevant Medications     dilTIAZem (Cardizem) 125 mg in dextrose 5% 125 mL (1 mg/mL) infusion (premix)     metoprolol tartrate (Lopressor) tablet 50 mg     metoprolol tartrate (Lopressor) 100 mg tablet     Hypothyroidism     * (Principal) Atrial fibrillation with RVR (Multi) - Primary     Relevant Medications     dilTIAZem (Cardizem) 125 mg in dextrose 5% 125 mL (1 mg/mL) infusion (premix)     metoprolol tartrate (Lopressor) tablet 50 mg     metoprolol tartrate (Lopressor) 100 mg tablet     Other Relevant Orders     Transthoracic echo (TTE) complete (Completed)     Holter Or Event Cardiac Monitor      Other Visit Diagnoses         Paroxysmal A-fib (Multi)         Relevant Medications     metoprolol tartrate (Lopressor) injection 5 mg (Completed)     metoprolol tartrate (Lopressor) injection 5 mg (Completed)     dilTIAZem (Cardizem) 125 mg in dextrose 5% 125 mL (1 mg/mL) infusion (premix)     dilTIAZem (Cardizem) injection 10 mg (Completed)     metoprolol tartrate (Lopressor) tablet 50 mg     polyethylene glycol (Glycolax, Miralax) 17 gram packet (Start on 12/14/2024)     methIMAzole (Tapazole) 5 mg tablet (Start on 12/14/2024)     metoprolol tartrate (Lopressor) 100 mg tablet     Other Relevant Orders     Transthoracic echo (TTE) complete (Completed)                Vital signs for last 24 hours   Temp:  [35.5 °C (95.9 °F)-36.6 °C (97.9 °F)] 36.6 °C (97.9 °F)  Heart Rate:  [66-73] 71  Resp:  [16-18] 16  BP: (131-144)/(61-65) 139/65    No intake/output data recorded.  Patient still requiring frequent cardiac and SPO2 monitoring. Continue aggressive pulmonary hygiene and oral hygiene. Off loading as tolerated for skin integrity.   Medications and labs reviewed-         Results for orders placed or performed during  the hospital encounter of 12/11/24 (from the past 24 hours)   POCT GLUCOSE   Result Value Ref Range     POCT Glucose 113 (H) 74 - 99 mg/dL   POCT GLUCOSE   Result Value Ref Range     POCT Glucose 171 (H) 74 - 99 mg/dL   POCT GLUCOSE   Result Value Ref Range     POCT Glucose 110 (H) 74 - 99 mg/dL   CBC and Auto Differential   Result Value Ref Range     WBC 8.8 4.4 - 11.3 x10*3/uL     nRBC 0.0 0.0 - 0.0 /100 WBCs     RBC 4.02 4.00 - 5.20 x10*6/uL     Hemoglobin 10.9 (L) 12.0 - 16.0 g/dL     Hematocrit 35.3 (L) 36.0 - 46.0 %     MCV 88 80 - 100 fL     MCH 27.1 26.0 - 34.0 pg     MCHC 30.9 (L) 32.0 - 36.0 g/dL     RDW 13.7 11.5 - 14.5 %     Platelets 233 150 - 450 x10*3/uL     Neutrophils % 50.4 40.0 - 80.0 %     Immature Granulocytes %, Automated 1.0 (H) 0.0 - 0.9 %     Lymphocytes % 36.9 13.0 - 44.0 %     Monocytes % 9.0 2.0 - 10.0 %     Eosinophils % 2.2 0.0 - 6.0 %     Basophils % 0.5 0.0 - 2.0 %     Neutrophils Absolute 4.42 1.60 - 5.50 x10*3/uL     Immature Granulocytes Absolute, Automated 0.09 0.00 - 0.50 x10*3/uL     Lymphocytes Absolute 3.23 (H) 0.80 - 3.00 x10*3/uL     Monocytes Absolute 0.79 0.05 - 0.80 x10*3/uL     Eosinophils Absolute 0.19 0.00 - 0.40 x10*3/uL     Basophils Absolute 0.04 0.00 - 0.10 x10*3/uL   Comprehensive metabolic panel   Result Value Ref Range     Glucose 104 (H) 74 - 99 mg/dL     Sodium 141 136 - 145 mmol/L     Potassium 4.1 3.5 - 5.3 mmol/L     Chloride 109 (H) 98 - 107 mmol/L     Bicarbonate 24 21 - 32 mmol/L     Anion Gap 12 10 - 20 mmol/L     Urea Nitrogen 20 6 - 23 mg/dL     Creatinine 0.81 0.50 - 1.05 mg/dL     eGFR 74 >60 mL/min/1.73m*2     Calcium 8.6 8.6 - 10.3 mg/dL     Albumin 3.4 3.4 - 5.0 g/dL     Alkaline Phosphatase 69 33 - 136 U/L     Total Protein 5.9 (L) 6.4 - 8.2 g/dL     AST 19 9 - 39 U/L     Bilirubin, Total 0.4 0.0 - 1.2 mg/dL     ALT 15 7 - 45 U/L   POCT GLUCOSE   Result Value Ref Range     POCT Glucose 105 (H) 74 - 99 mg/dL   POCT GLUCOSE   Result Value Ref  Range     POCT Glucose 119 (H) 74 - 99 mg/dL   Holter Or Event Cardiac Monitor   Result Value Ref Range     BSA 1.62 m2      Patient recently received an antibiotic (last 12 hours)         None          Patient seen resting in bed with head of bed elevated, no s/s or c/o acute difficulties at this time. Patient on telemetry shows afib.   Plan discussed with interdisciplinary team, awaiting endocrine input and seen by cardiology today- Continue with methimazole per endocrinology  -Continue Lopressor 50 twice daily to maintain normal sinus rhythm  -For recurrence, may consider a more nonselective beta blocker for rhythm suppression  -Supplemental O2 as needed to maintain SpO2 >94%, currently on RA  -Telemetry ordered for continuous cardiac monitoring  -Trend cell counts, renal function, electrolyte labs  -Echocardiogram ordered to reevaluate structural and functional status  -30-day event monitor on discharge with subsequent OP cardiology follow-up with Dr. Roman  -Has indication for long term AC  -Declined AC - would like to discuss with primary cardiologist.  Appreciate input and agree with plan. Will continue current and repeat labs in the AM.    Per [facility] - PCP- Dr NETTA Ralph  Pharmacy- [facility]  Able to obtain and afford medications. Takes as prescribed.  Stated she has questions about 2 new medications-- informed her will ask RN to follow up regarding those. Asked RN- she will follow up.   Pt is from home, lives with . Stated she is independent, no use of any assistive devices.   Performs own ADL's.  Still drives. Able to get to follow up appointments.  Does not feel she will have any needs upon discharge. Discharge planning discussed with patient and care team. Therapy evaluations ordered. Anticipate return to home at discharge. Patient aware and agreeable to current plan, continue plan as above.      I spent a total of 50 minutes on the date of the service which included  preparing to see the patient, face-to-face patient care, completing clinical documentation, obtaining and/or reviewing separately obtained history, performing a medically appropriate examination, counseling and educating the patient/family/caregiver, ordering medications, tests, or procedures, communicating with other HCPs (not separately reported), independently interpreting results (not separately reported), communicating results to the patient/family/caregiver, and care coordination (not separately reported).               Objective  Last Recorded Vitals  /65 (BP Location: Right arm, Patient Position: Lying)   Pulse 71   Temp 36.6 °C (97.9 °F) (Temporal)   Resp 16   Wt 61.2 kg (135 lb)   SpO2 96%   Intake/Output last 3 Shifts:  No intake or output data in the 24 hours ending 12/13/24 1452        Admission Weight  Weight: 61.2 kg (135 lb) (12/11/24 1037)     Daily Weight  12/11/24 : 61.2 kg (135 lb)     Image Results  ECG 12 lead  Atrial fibrillation with rapid ventricular response  ST & T wave abnormality, consider inferolateral ischemia  Abnormal ECG  When compared with ECG of 20-SEP-2024 03:36,  ST no longer depressed in Inferior leads  ST no longer depressed in Anterolateral leads  T wave inversion less evident in Lateral leads  Electrocardiogram, 12-lead PRN ACS symptoms  Sinus bradycardia  Nonspecific ST and T wave abnormality  Abnormal ECG  When compared with ECG of 12-DEC-2024 01:36, (unconfirmed)  No significant change was found        Physical Exam     Relevant Results                       Assessment/Plan  This patient currently has cardiac telemetry ordered; if you would like to modify or discontinue the telemetry order, click hereto go to the orders activity to modify/discontinue the order.                       Assessment & Plan  Atrial fibrillation with RVR (Multi)           Tripp Dexter MD   Physician  Internal Medicine     H&P      Addendum     Date of Service: 12/11/2024  3:46 PM       Addendum        Expand All Collapse All    History Of Present Illness  Nicki Paulson is a 79 y.o. female with past medical history of hypertension, hyperlipidemia, diabetes mellitus, atrial fibrillation and valvular heart disease (No Ac, aspirin only), Graves' disease, hypothyroidism, Sjogren's, osteoporosis, multiple spinal surgeries w/ most recent 3/2024 , appendectomy, JONATHAN, anxiety, and ischemic colitis who presented today with palpitations.  Patient reports she occasionally gets palpitations however they last no longer than 15 minutes.  She today, she reports she started getting palpitations that lasted for 2 to 3 hours so she decided to come to the emergency room for evaluation.  She admits to associated lightheadedness, dizziness, and shortness of breath.  She denies any associated nausea or diaphoresis.  She denies any recent illness.  She continues that she sees Dr. Sanford for her thyroid and on December 2 she was noted to be hyperthyroid so her Synthroid was stopped.  She continues that she is not on anticoagulation due to history of GI/rectal bleeding and she was to follow-up with Dr. Roman in April to discuss other medication options.     In the ED lab work, EKG, and chest x-ray were performed.  Labs revealed troponins 47 and 44 respectively, free thyroxine 1.46, TSH less than 0.01 (these numbers are unchanged from labs taken on December 2, 2024). EKG on arrival reveals atrial fibrillation with a rate of 140.  This was interpreted by the ED physician. Chest x-ray was negative for acute process.  She arrived with a heart rate of 140 and a low temp of 35.8, her vitals were otherwise stable.  She was given metoprolol and Cardizem and admitted for further evaluation and treatment under the care of Dr. Dexter.  Case discussed with Dr. Sanford and methimazole started.     Past Medical History: As above  Past Surgical history: Saphenous vein ablation, Knee replacement, Lumbar laminectomy, b/l foraminotomy  2-4; T spine T12 laminectomy, cervical fusion, tonsillectomy, total abdominal hysterectomy, appendectomy, and bladder suspension surgery.  Social history: Non-smoker, no illicit drug use, alcohol abuse.  Family history: Noncontributory  10 point ROS performed and is negative besides what is stated in HPI.     Past Medical History  Medical History           Past Medical History:   Diagnosis Date    Disease of thyroid gland      Other chest pain       Chest pressure    Personal history of other endocrine, nutritional and metabolic disease       History of type 2 diabetes mellitus    Personal history of other specified conditions       History of palpitations    Varicose veins of unspecified lower extremity with other complications       Varicose veins of leg with complications            Surgical History  Surgical History             Past Surgical History:   Procedure Laterality Date    BLADDER SURGERY   07/05/2018     Bladder Surgery            Social History  She reports that she has never smoked. She has never used smokeless tobacco. She reports that she does not drink alcohol and does not use drugs.  Lives with      Family History  Daughter: Lupus     Allergies  Atorvastatin, Bactrim [sulfamethoxazole-trimethoprim], Ciprofloxacin, Doxycycline, and Cyclosporine     Physical Exam  Constitutional:       Appearance: Normal appearance.   HENT:      Head: Normocephalic and atraumatic.      Nose: Nose normal.      Mouth/Throat:      Mouth: Mucous membranes are moist.   Eyes:      Conjunctiva/sclera: Conjunctivae normal.   Cardiovascular:      Rate and Rhythm: Normal rate. Rhythm irregular.      Heart sounds: Normal heart sounds.   Pulmonary:      Effort: Pulmonary effort is normal.      Breath sounds: Normal breath sounds.   Abdominal:      General: Bowel sounds are normal.      Palpations: Abdomen is soft.      Tenderness: There is abdominal tenderness.   Musculoskeletal:         General: Normal range of motion.  "     Cervical back: Neck supple.   Skin:     General: Skin is warm and dry.   Neurological:      Mental Status: She is alert. Mental status is at baseline.   Psychiatric:         Mood and Affect: Mood normal.            Last Recorded Vitals  Blood pressure 117/58, pulse 88, temperature 35.8 °C (96.4 °F), temperature source Temporal, resp. rate 20, height 1.549 m (5' 1\"), weight 61.2 kg (135 lb), SpO2 (!) 93%.     Relevant Results     Medications Ordered Prior to Encounter   No current facility-administered medications on file prior to encounter.                  Current Outpatient Medications on File Prior to Encounter   Medication Sig Dispense Refill    acetaminophen (Tylenol) 325 mg tablet Take 2 tablets (650 mg) by mouth every 4 hours if needed for fever (temp greater than 38.0 C) (greater than or equal to 38 C). 30 tablet 0    albuterol 90 mcg/actuation inhaler Inhale 2 puffs every 4 hours if needed.        aspirin 81 mg EC tablet Take 1 tablet (81 mg) by mouth once daily at bedtime.        calcium carbonate (Tums Extra Strength) 300 mg (750 mg) chewable tablet Chew 300 mg 3 times a day as needed for indigestion or heartburn.        CALCIUM-MAGNESIUM-ZINC ORAL Take 1 tablet by mouth once daily at bedtime.        cholecalciferol (D3-2000) 50 mcg (2,000 unit) capsule Take 1 capsule (50 mcg) by mouth once daily at bedtime.        fexofenadine (Allegra Allergy) 180 mg tablet Take 1 tablet (180 mg) by mouth once daily in the morning.        fluticasone (Flonase) 50 mcg/actuation nasal spray Administer 1 spray into each nostril once daily as needed for allergies.        lisinopril 20 mg tablet Take 1 tablet (20 mg) by mouth once daily.        metFORMIN  mg 24 hr tablet Take 1 tablet (500 mg) by mouth once daily in the evening. Take with meals.        metoprolol tartrate (Lopressor) 50 mg tablet Take 1 tablet (50 mg) by mouth 2 times a day. 180 tablet 3    multivitamin with minerals tablet Take 1 tablet by mouth " once daily in the morning.        omeprazole (PriLOSEC) 40 mg DR capsule Take 1 capsule (40 mg) by mouth if needed (as needed).        hydroxychloroquine (Plaquenil) 200 mg tablet Take 1 tablet (200 mg) by mouth once daily in the morning.        levothyroxine (Synthroid) 50 mcg tablet Take 1 tablet (50 mcg) by mouth 4 times a week. Every Monday/Tuesday/Thursday/Friday (Patient not taking: Reported on 12/11/2024)                          Results for orders placed or performed during the hospital encounter of 12/11/24 (from the past 24 hours)   CBC and Auto Differential   Result Value Ref Range     WBC 9.2 4.4 - 11.3 x10*3/uL     nRBC 0.0 0.0 - 0.0 /100 WBCs     RBC 4.52 4.00 - 5.20 x10*6/uL     Hemoglobin 12.7 12.0 - 16.0 g/dL     Hematocrit 39.9 36.0 - 46.0 %     MCV 88 80 - 100 fL     MCH 28.1 26.0 - 34.0 pg     MCHC 31.8 (L) 32.0 - 36.0 g/dL     RDW 13.5 11.5 - 14.5 %     Platelets 278 150 - 450 x10*3/uL     Neutrophils % 62.5 40.0 - 80.0 %     Immature Granulocytes %, Automated 1.3 (H) 0.0 - 0.9 %     Lymphocytes % 29.0 13.0 - 44.0 %     Monocytes % 6.0 2.0 - 10.0 %     Eosinophils % 0.8 0.0 - 6.0 %     Basophils % 0.4 0.0 - 2.0 %     Neutrophils Absolute 5.77 (H) 1.60 - 5.50 x10*3/uL     Immature Granulocytes Absolute, Automated 0.12 0.00 - 0.50 x10*3/uL     Lymphocytes Absolute 2.68 0.80 - 3.00 x10*3/uL     Monocytes Absolute 0.55 0.05 - 0.80 x10*3/uL     Eosinophils Absolute 0.07 0.00 - 0.40 x10*3/uL     Basophils Absolute 0.04 0.00 - 0.10 x10*3/uL   Magnesium   Result Value Ref Range     Magnesium 1.66 1.60 - 2.40 mg/dL   Comprehensive metabolic panel   Result Value Ref Range     Glucose 191 (H) 74 - 99 mg/dL     Sodium 141 136 - 145 mmol/L     Potassium 4.4 3.5 - 5.3 mmol/L     Chloride 107 98 - 107 mmol/L     Bicarbonate 24 21 - 32 mmol/L     Anion Gap 14 10 - 20 mmol/L     Urea Nitrogen 19 6 - 23 mg/dL     Creatinine 0.94 0.50 - 1.05 mg/dL     eGFR 62 >60 mL/min/1.73m*2     Calcium 9.2 8.6 - 10.3 mg/dL      Albumin 4.1 3.4 - 5.0 g/dL     Alkaline Phosphatase 87 33 - 136 U/L     Total Protein 6.9 6.4 - 8.2 g/dL     AST 33 9 - 39 U/L     Bilirubin, Total 0.4 0.0 - 1.2 mg/dL     ALT 23 7 - 45 U/L   TSH with reflex to Free T4 if abnormal   Result Value Ref Range     Thyroid Stimulating Hormone <0.01 (L) 0.44 - 3.98 mIU/L   Troponin I, High Sensitivity, Initial   Result Value Ref Range     Troponin I, High Sensitivity 47 (H) 0 - 13 ng/L   Thyroxine, Free   Result Value Ref Range     Thyroxine, Free 1.46 (H) 0.61 - 1.12 ng/dL   Troponin, High Sensitivity, 1 Hour   Result Value Ref Range     Troponin I, High Sensitivity 44 (H) 0 - 13 ng/L         XR chest 1 view     Result Date: 12/11/2024  Interpreted By:  Berny Martinez, STUDY: XR CHEST 1 VIEW;  12/11/2024 11:18 am   INDICATION: Signs/Symptoms:cp, dizzy.     COMPARISON: 09/22/2024   ACCESSION NUMBER(S): [id number]   ORDERING CLINICIAN: CRISS SANDRA   FINDINGS:         CARDIOMEDIASTINAL SILHOUETTE: Cardiomediastinal silhouette is normal in size and configuration.   LUNGS: Lungs are clear. There is no consolidation or effusion.   ABDOMEN: No remarkable upper abdominal findings.   BONES: No acute osseous changes.        1.  No evidence of acute cardiopulmonary process.       MACRO: None   Signed by: Berny Martinez 12/11/2024 11:21 AM Dictation workstation:   [id number]          Assessment/Plan     Assessment & Plan  Atrial fibrillation with RVR (Multi)        Hyperthyroidism  Elevated troponin      admit to telemetry  Inpatient  Appreciate cardiology and endocrine recommendations  Continue to hold home Synthroid  Start methimazole  Echo  A.m. CBC and BMP  Add T3  Continue Cardizem drip  N.p.o. after midnight  Given history of rectal bleeding, defer need for anticoagulation to cardiology        Chronic conditions: Hypertension, hyperlipidemia, diabetes, A-fib, Graves' disease, so Graves' disease, anxiety     Continue home medications as listed above     DVT  prophylaxis     SCDs  Lovenox     Patient fully evaluated and plan as above  Patient fully evaluated, repeat labs in AM and continue plan as above.  I spent 75 minutes in the professional and overall care of this patient.                  Revision History          Patient fully evaluated  12/13  for    Problem List Items Addressed This Visit         Paroxysmal atrial fibrillation (Multi)     Relevant Medications     dilTIAZem (Cardizem) 125 mg in dextrose 5% 125 mL (1 mg/mL) infusion (premix)     metoprolol tartrate (Lopressor) tablet 50 mg     metoprolol tartrate (Lopressor) 100 mg tablet     Hypothyroidism     * (Principal) Atrial fibrillation with RVR (Multi) - Primary     Relevant Medications     dilTIAZem (Cardizem) 125 mg in dextrose 5% 125 mL (1 mg/mL) infusion (premix)     metoprolol tartrate (Lopressor) tablet 50 mg     metoprolol tartrate (Lopressor) 100 mg tablet     Other Relevant Orders     Transthoracic echo (TTE) complete (Completed)     Holter Or Event Cardiac Monitor      Other Visit Diagnoses         Paroxysmal A-fib (Multi)         Relevant Medications     metoprolol tartrate (Lopressor) injection 5 mg (Completed)     metoprolol tartrate (Lopressor) injection 5 mg (Completed)     dilTIAZem (Cardizem) 125 mg in dextrose 5% 125 mL (1 mg/mL) infusion (premix)     dilTIAZem (Cardizem) injection 10 mg (Completed)     metoprolol tartrate (Lopressor) tablet 50 mg     polyethylene glycol (Glycolax, Miralax) 17 gram packet (Start on 12/14/2024)     methIMAzole (Tapazole) 5 mg tablet (Start on 12/14/2024)     metoprolol tartrate (Lopressor) 100 mg tablet     Other Relevant Orders     Transthoracic echo (TTE) complete (Completed)             Vital signs for last 24 hours   Temp:  [35.5 °C (95.9 °F)-36.6 °C (97.9 °F)] 36.6 °C (97.9 °F)  Heart Rate:  [66-73] 71  Resp:  [16-18] 16  BP: (131-144)/(61-65) 139/65    No intake/output data recorded.     Patient still requiring frequent cardiac and SPO2 monitoring.  Continue aggressive pulmonary hygiene and oral hygiene. Off loading as tolerated for skin integrity.   Medications and labs reviewed-         Results for orders placed or performed during the hospital encounter of 12/11/24 (from the past 24 hours)   POCT GLUCOSE   Result Value Ref Range     POCT Glucose 113 (H) 74 - 99 mg/dL   POCT GLUCOSE   Result Value Ref Range     POCT Glucose 171 (H) 74 - 99 mg/dL   POCT GLUCOSE   Result Value Ref Range     POCT Glucose 110 (H) 74 - 99 mg/dL   CBC and Auto Differential   Result Value Ref Range     WBC 8.8 4.4 - 11.3 x10*3/uL     nRBC 0.0 0.0 - 0.0 /100 WBCs     RBC 4.02 4.00 - 5.20 x10*6/uL     Hemoglobin 10.9 (L) 12.0 - 16.0 g/dL     Hematocrit 35.3 (L) 36.0 - 46.0 %     MCV 88 80 - 100 fL     MCH 27.1 26.0 - 34.0 pg     MCHC 30.9 (L) 32.0 - 36.0 g/dL     RDW 13.7 11.5 - 14.5 %     Platelets 233 150 - 450 x10*3/uL     Neutrophils % 50.4 40.0 - 80.0 %     Immature Granulocytes %, Automated 1.0 (H) 0.0 - 0.9 %     Lymphocytes % 36.9 13.0 - 44.0 %     Monocytes % 9.0 2.0 - 10.0 %     Eosinophils % 2.2 0.0 - 6.0 %     Basophils % 0.5 0.0 - 2.0 %     Neutrophils Absolute 4.42 1.60 - 5.50 x10*3/uL     Immature Granulocytes Absolute, Automated 0.09 0.00 - 0.50 x10*3/uL     Lymphocytes Absolute 3.23 (H) 0.80 - 3.00 x10*3/uL     Monocytes Absolute 0.79 0.05 - 0.80 x10*3/uL     Eosinophils Absolute 0.19 0.00 - 0.40 x10*3/uL     Basophils Absolute 0.04 0.00 - 0.10 x10*3/uL   Comprehensive metabolic panel   Result Value Ref Range     Glucose 104 (H) 74 - 99 mg/dL     Sodium 141 136 - 145 mmol/L     Potassium 4.1 3.5 - 5.3 mmol/L     Chloride 109 (H) 98 - 107 mmol/L     Bicarbonate 24 21 - 32 mmol/L     Anion Gap 12 10 - 20 mmol/L     Urea Nitrogen 20 6 - 23 mg/dL     Creatinine 0.81 0.50 - 1.05 mg/dL     eGFR 74 >60 mL/min/1.73m*2     Calcium 8.6 8.6 - 10.3 mg/dL     Albumin 3.4 3.4 - 5.0 g/dL     Alkaline Phosphatase 69 33 - 136 U/L     Total Protein 5.9 (L) 6.4 - 8.2 g/dL     AST 19 9 -  39 U/L     Bilirubin, Total 0.4 0.0 - 1.2 mg/dL     ALT 15 7 - 45 U/L   POCT GLUCOSE   Result Value Ref Range     POCT Glucose 105 (H) 74 - 99 mg/dL   POCT GLUCOSE   Result Value Ref Range     POCT Glucose 119 (H) 74 - 99 mg/dL   Holter Or Event Cardiac Monitor   Result Value Ref Range     BSA 1.62 m2      Patient recently received an antibiotic (last 12 hours)         None             Patient without acute difficulties overnight.Plan discussed with interdisciplinary team, ECHO in process, awaiting results and cardiology recommendations. Patient seen by endocrinology today related to graves disease - Thyrotoxicosis: Likely recurrence of Graves' disease.  Her antibodies are positive.  The patient's been started on methimazole 5 mg daily.  I counseled her once again that she has to have repeat thyroid function testing in 4 to 6 weeks.  I will place those orders in the  system for her to have.  She understands that she supposed to reach out to me when she does this so I can review them and adjust her antithyroid drug accordingly.  She was counseled about the adverse events of thionamides.     # Uncontrolled Type 2 Diabetes Mellitus  Home Regimen: Metformin  mg qDinner  Managed by her PCP as Outpatient.   - Cont SSI for now      # Atrial Fibrillation: Per cardiology.  We will try to achieve euthyroidism as fast as possible so as to not exacerbate this issue.     # Dyslipidemia: The patient is followed by Dr. Roman.  She is reportedly statin intolerant     Appreciate input and agree with plan. Will continue current and repeat labs in the AM.      Patient with family in room, plan discussed with patient and family, all agreeable. Supplemental oxygen at times related to exertional dyspnea, not required at rest. Patient seen resting in bedside chair, no distress noted, 97% SpO2 on room air.   continue current and repeat labs in the AM.      Discharge planning discussed with patient and care team. Therapy evaluations  ordered. [facility], anticipate C/SNF at discharge. Patient aware and agreeable to current plan, continue plan as above.      I spent a total of 50 minutes on the date of the service which included preparing to see the patient, face-to-face patient care, completing clinical documentation, obtaining and/or reviewing separately obtained history, performing a medically appropriate examination, counseling and educating the patient/family/caregiver, ordering medications, tests, or procedures, communicating with other HCPs (not separately reported), independently interpreting results (not separately reported), communicating results to the patient/family/caregiver, and care coordination (not separately reported).      Lorrie Chong                      Revision History       Pertinent Physical Exam At Time of Discharge  Physical Exam    Outpatient Follow-Up  Future Appointments   Date Time Provider Department Center   4/16/2025 10:00 AM PAR MAC 3 ECHO ROOM 2 [id number] MAC 3300   4/16/2025 11:00 AM William Roman MD [id number] [place]   9/10/2025 10:30 AM PAR MAC 3 VASC LAB [id number] MAC 3300   9/10/2025 11:45 AM William Roman MD [id number] Manuel Dexter MD

## 2025-01-03 ENCOUNTER — LAB (OUTPATIENT)
Dept: LAB | Facility: LAB | Age: 80
End: 2025-01-03
Payer: MEDICARE

## 2025-01-03 DIAGNOSIS — E05.90 HYPERTHYROIDISM: Primary | ICD-10-CM

## 2025-01-03 DIAGNOSIS — E05.90 HYPERTHYROIDISM: ICD-10-CM

## 2025-01-03 DIAGNOSIS — E03.9 HYPOTHYROIDISM, UNSPECIFIED: Primary | ICD-10-CM

## 2025-01-03 LAB
T3FREE SERPL-MCNC: 2.6 PG/ML (ref 2.3–4.2)
T4 FREE SERPL-MCNC: 0.64 NG/DL (ref 0.61–1.12)
TSH SERPL-ACNC: 0.83 MIU/L (ref 0.44–3.98)

## 2025-01-03 PROCEDURE — 84439 ASSAY OF FREE THYROXINE: CPT

## 2025-01-03 PROCEDURE — 84443 ASSAY THYROID STIM HORMONE: CPT

## 2025-01-03 PROCEDURE — 84481 FREE ASSAY (FT-3): CPT

## 2025-01-16 LAB — BODY SURFACE AREA: 1.62 M2

## 2025-01-16 PROCEDURE — 93272 ECG/REVIEW INTERPRET ONLY: CPT | Performed by: INTERNAL MEDICINE

## 2025-01-28 DIAGNOSIS — I48.0 PAROXYSMAL A-FIB (MULTI): Primary | ICD-10-CM

## 2025-01-28 RX ORDER — METOPROLOL TARTRATE 100 MG/1
100 TABLET ORAL 2 TIMES DAILY
Qty: 180 TABLET | Refills: 0 | Status: SHIPPED | OUTPATIENT
Start: 2025-01-28

## 2025-04-16 ENCOUNTER — OFFICE VISIT (OUTPATIENT)
Dept: CARDIOLOGY | Facility: CLINIC | Age: 80
End: 2025-04-16
Payer: MEDICARE

## 2025-04-16 ENCOUNTER — HOSPITAL ENCOUNTER (OUTPATIENT)
Dept: CARDIOLOGY | Facility: CLINIC | Age: 80
Discharge: HOME | End: 2025-04-16
Payer: MEDICARE

## 2025-04-16 VITALS
DIASTOLIC BLOOD PRESSURE: 70 MMHG | SYSTOLIC BLOOD PRESSURE: 120 MMHG | HEART RATE: 54 BPM | OXYGEN SATURATION: 94 % | WEIGHT: 140 LBS | HEIGHT: 62 IN | BODY MASS INDEX: 25.76 KG/M2

## 2025-04-16 VITALS
HEIGHT: 61 IN | BODY MASS INDEX: 25.49 KG/M2 | DIASTOLIC BLOOD PRESSURE: 68 MMHG | WEIGHT: 135 LBS | SYSTOLIC BLOOD PRESSURE: 155 MMHG

## 2025-04-16 DIAGNOSIS — I35.1 MODERATE AORTIC INSUFFICIENCY: ICD-10-CM

## 2025-04-16 DIAGNOSIS — I07.1 MODERATE TRICUSPID REGURGITATION: ICD-10-CM

## 2025-04-16 DIAGNOSIS — I34.0 MODERATE MITRAL REGURGITATION: ICD-10-CM

## 2025-04-16 DIAGNOSIS — E78.5 HYPERLIPIDEMIA, UNSPECIFIED HYPERLIPIDEMIA TYPE: ICD-10-CM

## 2025-04-16 DIAGNOSIS — I10 ESSENTIAL HYPERTENSION: ICD-10-CM

## 2025-04-16 DIAGNOSIS — I48.0 PAROXYSMAL ATRIAL FIBRILLATION (MULTI): Primary | ICD-10-CM

## 2025-04-16 DIAGNOSIS — I48.0 PAROXYSMAL ATRIAL FIBRILLATION (MULTI): ICD-10-CM

## 2025-04-16 PROBLEM — I48.91 ATRIAL FIBRILLATION WITH RVR (MULTI): Status: RESOLVED | Noted: 2024-12-11 | Resolved: 2025-04-16

## 2025-04-16 PROCEDURE — 1159F MED LIST DOCD IN RCRD: CPT | Performed by: INTERNAL MEDICINE

## 2025-04-16 PROCEDURE — 99214 OFFICE O/P EST MOD 30 MIN: CPT | Mod: 25 | Performed by: INTERNAL MEDICINE

## 2025-04-16 PROCEDURE — 3078F DIAST BP <80 MM HG: CPT | Performed by: INTERNAL MEDICINE

## 2025-04-16 PROCEDURE — 99214 OFFICE O/P EST MOD 30 MIN: CPT | Performed by: INTERNAL MEDICINE

## 2025-04-16 PROCEDURE — 3074F SYST BP LT 130 MM HG: CPT | Performed by: INTERNAL MEDICINE

## 2025-04-16 PROCEDURE — 1036F TOBACCO NON-USER: CPT | Performed by: INTERNAL MEDICINE

## 2025-04-16 PROCEDURE — 93306 TTE W/DOPPLER COMPLETE: CPT | Performed by: INTERNAL MEDICINE

## 2025-04-16 PROCEDURE — 93306 TTE W/DOPPLER COMPLETE: CPT

## 2025-04-16 NOTE — PROGRESS NOTES
Subjective   Nicki Paulson is a 80 y.o. female.    Chief Complaint:  Hospital follow-up for rapid atrial fibrillation.    HPI    She was hospitalized in December 2024 for when she presented with atrial fibrillation with rapid ventricular response.  She did spontaneously convert.  She was noted to be hyperthyroid.  Her thyroid medications were adjusted.  Since discharge she has remained in a normal rhythm.  Her beta-blockers were increased.  She has had no further episodes of gastrointestinal bleeding.    She presented in September 2024 with rapid atrial fibrillation.  This was not associated with gastrointestinal bleeding.    The patient underwent right knee surgery in 2018 at [facility]. Post procedure, she had an episode of atrial fibrillation. She was started on amiodarone and on Eliquis.  These medications were subsequently stopped.    The patient's risk factors for the presence of coronary artery disease include hypertension, diabetes and hyperlipidemia. She is a nonsmoker and has never smoked. There is no definite family history of premature coronary artery disease.     The patient underwent a coronary calcium score on 12/28/17. This turned out to be zero indicating the absence of atherosclerotic coronary artery disease.     A cardiac event monitor from 1/2/18 to 1/15/18 demonstrated no evidence of atrial fibrillation.     She has a past history of saphenous vein ablation done by Dr. Dukes several years ago..      Allergies  Medication    · ciprofloxacin   · Lipitor     Family History  Mother    · No pertinent family history  Father    · No pertinent family history     Social History  Problems    ·    · Never a smoker   · No alcohol use     Review of Systems   Constitutional: Positive for malaise/fatigue.   Cardiovascular:  Positive for dyspnea on exertion.  Positive for palpitations.    Current Outpatient Medications   Medication Sig Dispense Refill    acetaminophen (Tylenol) 325 mg  "tablet Take 2 tablets (650 mg) by mouth every 4 hours if needed for fever (temp greater than 38.0 C) (greater than or equal to 38 C). 30 tablet 0    albuterol 90 mcg/actuation inhaler Inhale 2 puffs every 4 hours if needed.      aspirin 81 mg EC tablet Take 1 tablet (81 mg) by mouth once daily at bedtime.      CALCIUM-MAGNESIUM-ZINC ORAL Take 1 tablet by mouth once daily at bedtime.      cholecalciferol (D3-2000) 50 mcg (2,000 unit) capsule Take 1 capsule (2,000 Units) by mouth once daily at bedtime.      fexofenadine (Allegra Allergy) 180 mg tablet Take 1 tablet (180 mg) by mouth once daily in the morning.      fluticasone (Flonase) 50 mcg/actuation nasal spray Administer 1 spray into each nostril once daily as needed for allergies.      lisinopril 20 mg tablet Take 1 tablet (20 mg) by mouth once daily.      metFORMIN  mg 24 hr tablet Take 1 tablet (500 mg) by mouth once daily in the evening. Take with meals.      metoprolol tartrate (Lopressor) 100 mg tablet Take 1 tablet (100 mg) by mouth 2 times a day. 180 tablet 0    multivitamin with minerals tablet Take 1 tablet by mouth once daily in the morning.      omeprazole (PriLOSEC) 40 mg DR capsule Take 1 capsule (40 mg) by mouth if needed (as needed).      methIMAzole (Tapazole) 5 mg tablet Take 1 tablet (5 mg) by mouth once daily. 30 tablet 0     No current facility-administered medications for this visit.        Visit Vitals  /70 (BP Location: Left arm)   Pulse 54   Ht 1.575 m (5' 2\")   Wt 63.5 kg (140 lb)   SpO2 94%   BMI 25.61 kg/m²   Smoking Status Never   BSA 1.67 m²        Objective     Constitutional:       Appearance: Not in distress.   Neck:      Vascular: JVD normal.   Pulmonary:      Breath sounds: Normal breath sounds.   Cardiovascular:      Normal rate. Regular rhythm. Normal S1. Normal S2.       Murmurs: There is no murmur.      No gallop.    Pulses:     Intact distal pulses.   Edema:     Peripheral edema absent.   Abdominal:      General: " There is no distension.      Palpations: Abdomen is soft.   Neurological:      Mental Status: Alert.         Lab Review:   Lab Results   Component Value Date     12/13/2024    K 4.1 12/13/2024     (H) 12/13/2024    CO2 24 12/13/2024    BUN 20 12/13/2024    CREATININE 0.81 12/13/2024    GLUCOSE 104 (H) 12/13/2024    CALCIUM 8.6 12/13/2024     Lab Results   Component Value Date    WBC 8.8 12/13/2024    HGB 10.9 (L) 12/13/2024    HCT 35.3 (L) 12/13/2024    MCV 88 12/13/2024     12/13/2024       Assessment:    1.  Paroxysmal atrial fibrillation.  Each time she has had atrial fibrillation she has had a trigger.  On 1 occasion it was knee surgery.  Another occasion was gastrointestinal bleeding.  This latest occasion as she was severely hyperthyroid.  TSH was less than 0.01.  With adjustment of her thyroid she has been better.  We discussed the use of anticoagulation.  She does not want to proceed at this point in time.    2.  Hypertension.  Blood pressures are normal.    3.  Valvular heart disease.  Tricuspid and mitral regurgitation.  Report is pending.

## 2025-04-17 LAB
AORTIC VALVE MEAN GRADIENT: 4 MMHG
AORTIC VALVE PEAK VELOCITY: 1.42 M/S
AV PEAK GRADIENT: 8 MMHG
AVA (PEAK VEL): 1.72 CM2
AVA (VTI): 1.71 CM2
EJECTION FRACTION APICAL 4 CHAMBER: 59.1
EJECTION FRACTION: 63 %
LEFT ATRIUM VOLUME AREA LENGTH INDEX BSA: 59.4 ML/M2
LEFT VENTRICLE INTERNAL DIMENSION DIASTOLE: 3.83 CM (ref 3.5–6)
LEFT VENTRICULAR OUTFLOW TRACT DIAMETER: 1.98 CM
MITRAL VALVE E/A RATIO: 1.2
RIGHT VENTRICLE PEAK SYSTOLIC PRESSURE: 35.1 MMHG

## 2025-05-07 DIAGNOSIS — I48.0 PAROXYSMAL A-FIB (MULTI): ICD-10-CM

## 2025-05-07 RX ORDER — METOPROLOL TARTRATE 100 MG/1
100 TABLET ORAL 2 TIMES DAILY
Qty: 180 TABLET | Refills: 2 | Status: SHIPPED | OUTPATIENT
Start: 2025-05-07